# Patient Record
Sex: FEMALE | Race: WHITE | NOT HISPANIC OR LATINO | Employment: FULL TIME | URBAN - NONMETROPOLITAN AREA
[De-identification: names, ages, dates, MRNs, and addresses within clinical notes are randomized per-mention and may not be internally consistent; named-entity substitution may affect disease eponyms.]

---

## 2017-01-31 ENCOUNTER — TRANSCRIBE ORDERS (OUTPATIENT)
Dept: LAB | Facility: OTHER | Age: 52
End: 2017-01-31

## 2017-01-31 DIAGNOSIS — E78.00 PURE HYPERCHOLESTEROLEMIA: ICD-10-CM

## 2017-01-31 DIAGNOSIS — D50.9 NORMOCYTIC HYPOCHROMIC ANEMIA: Primary | ICD-10-CM

## 2017-02-01 ENCOUNTER — LAB (OUTPATIENT)
Dept: LAB | Facility: OTHER | Age: 52
End: 2017-02-01

## 2017-02-01 DIAGNOSIS — E78.00 PURE HYPERCHOLESTEROLEMIA: ICD-10-CM

## 2017-02-01 DIAGNOSIS — D50.9 NORMOCYTIC HYPOCHROMIC ANEMIA: ICD-10-CM

## 2017-02-01 LAB
ALBUMIN SERPL-MCNC: 4.3 G/DL (ref 3.2–5.5)
ALBUMIN/GLOB SERPL: 1.2 G/DL (ref 1–3)
ALP SERPL-CCNC: 59 U/L (ref 15–121)
ALT SERPL W P-5'-P-CCNC: 22 U/L (ref 10–60)
ANION GAP SERPL CALCULATED.3IONS-SCNC: 9 MMOL/L (ref 5–15)
AST SERPL-CCNC: 23 U/L (ref 10–60)
BASOPHILS # BLD AUTO: 0.02 10*3/MM3 (ref 0–0.2)
BASOPHILS NFR BLD AUTO: 0.3 % (ref 0–2)
BILIRUB SERPL-MCNC: 0.7 MG/DL (ref 0.2–1)
BUN BLD-MCNC: 15 MG/DL (ref 8–25)
BUN/CREAT SERPL: 16.7 (ref 7–25)
CALCIUM SPEC-SCNC: 9.7 MG/DL (ref 8.4–10.8)
CHLORIDE SERPL-SCNC: 102 MMOL/L (ref 100–112)
CHOLEST SERPL-MCNC: 172 MG/DL (ref 150–200)
CO2 SERPL-SCNC: 28 MMOL/L (ref 20–32)
CREAT BLD-MCNC: 0.9 MG/DL (ref 0.4–1.3)
DEPRECATED RDW RBC AUTO: 37.5 FL (ref 36.4–46.3)
EOSINOPHIL # BLD AUTO: 0.18 10*3/MM3 (ref 0–0.7)
EOSINOPHIL NFR BLD AUTO: 2.9 % (ref 0–7)
ERYTHROCYTE [DISTWIDTH] IN BLOOD BY AUTOMATED COUNT: 12.5 % (ref 11.5–14.5)
GFR SERPL CREATININE-BSD FRML MDRD: 67 ML/MIN/1.73 (ref 55–135)
GLOBULIN UR ELPH-MCNC: 3.5 GM/DL (ref 2.5–4.6)
GLUCOSE BLD-MCNC: 106 MG/DL (ref 70–100)
HCT VFR BLD AUTO: 39.2 % (ref 35–45)
HDLC SERPL-MCNC: 42 MG/DL (ref 35–100)
HGB BLD-MCNC: 13.2 G/DL (ref 12–15.5)
LDLC SERPL CALC-MCNC: 105 MG/DL
LDLC/HDLC SERPL: 2.5 {RATIO}
LYMPHOCYTES # BLD AUTO: 1.72 10*3/MM3 (ref 0.6–4.2)
LYMPHOCYTES NFR BLD AUTO: 27.8 % (ref 10–50)
MCH RBC QN AUTO: 28.6 PG (ref 26.5–34)
MCHC RBC AUTO-ENTMCNC: 33.7 G/DL (ref 31.4–36)
MCV RBC AUTO: 85 FL (ref 80–98)
MONOCYTES # BLD AUTO: 0.33 10*3/MM3 (ref 0–0.9)
MONOCYTES NFR BLD AUTO: 5.3 % (ref 0–12)
NEUTROPHILS # BLD AUTO: 3.93 10*3/MM3 (ref 2–8.6)
NEUTROPHILS NFR BLD AUTO: 63.7 % (ref 37–80)
PLATELET # BLD AUTO: 241 10*3/MM3 (ref 150–450)
PMV BLD AUTO: 10.2 FL (ref 8–12)
POTASSIUM BLD-SCNC: 3.8 MMOL/L (ref 3.4–5.4)
PROT SERPL-MCNC: 7.8 G/DL (ref 6.7–8.2)
RBC # BLD AUTO: 4.61 10*6/MM3 (ref 3.77–5.16)
SODIUM BLD-SCNC: 139 MMOL/L (ref 134–146)
TRIGL SERPL-MCNC: 126 MG/DL (ref 35–160)
VLDLC SERPL-MCNC: 25.2 MG/DL
WBC NRBC COR # BLD: 6.18 10*3/MM3 (ref 3.2–9.8)

## 2017-02-01 PROCEDURE — 85025 COMPLETE CBC W/AUTO DIFF WBC: CPT | Performed by: INTERNAL MEDICINE

## 2017-02-01 PROCEDURE — 36415 COLL VENOUS BLD VENIPUNCTURE: CPT | Performed by: INTERNAL MEDICINE

## 2017-02-01 PROCEDURE — 82728 ASSAY OF FERRITIN: CPT | Performed by: INTERNAL MEDICINE

## 2017-02-01 PROCEDURE — 80061 LIPID PANEL: CPT | Performed by: INTERNAL MEDICINE

## 2017-02-01 PROCEDURE — 80053 COMPREHEN METABOLIC PANEL: CPT | Performed by: INTERNAL MEDICINE

## 2017-02-02 LAB — FERRITIN SERPL-MCNC: 41.1 NG/ML (ref 6.2–137)

## 2017-02-06 ENCOUNTER — OFFICE VISIT (OUTPATIENT)
Dept: FAMILY MEDICINE CLINIC | Facility: CLINIC | Age: 52
End: 2017-02-06

## 2017-02-06 VITALS
HEART RATE: 88 BPM | SYSTOLIC BLOOD PRESSURE: 120 MMHG | HEIGHT: 62 IN | DIASTOLIC BLOOD PRESSURE: 71 MMHG | WEIGHT: 149 LBS | TEMPERATURE: 98.3 F | BODY MASS INDEX: 27.42 KG/M2

## 2017-02-06 DIAGNOSIS — K21.9 GASTROESOPHAGEAL REFLUX DISEASE, ESOPHAGITIS PRESENCE NOT SPECIFIED: ICD-10-CM

## 2017-02-06 DIAGNOSIS — G56.03 CARPAL TUNNEL SYNDROME ON BOTH SIDES: ICD-10-CM

## 2017-02-06 DIAGNOSIS — Z12.31 SCREENING MAMMOGRAM, ENCOUNTER FOR: Primary | ICD-10-CM

## 2017-02-06 DIAGNOSIS — M77.11 RIGHT LATERAL EPICONDYLITIS: ICD-10-CM

## 2017-02-06 DIAGNOSIS — D50.9 IRON DEFICIENCY ANEMIA, UNSPECIFIED IRON DEFICIENCY ANEMIA TYPE: ICD-10-CM

## 2017-02-06 DIAGNOSIS — E78.5 HYPERLIPIDEMIA, UNSPECIFIED HYPERLIPIDEMIA TYPE: Primary | ICD-10-CM

## 2017-02-06 PROCEDURE — 99214 OFFICE O/P EST MOD 30 MIN: CPT | Performed by: INTERNAL MEDICINE

## 2017-02-06 RX ORDER — ALBUTEROL SULFATE 90 UG/1
2 AEROSOL, METERED RESPIRATORY (INHALATION) EVERY 6 HOURS PRN
Qty: 1 INHALER | Refills: 5 | Status: SHIPPED | OUTPATIENT
Start: 2017-02-06 | End: 2018-11-12 | Stop reason: SDUPTHER

## 2017-02-06 RX ORDER — BUDESONIDE AND FORMOTEROL FUMARATE DIHYDRATE 160; 4.5 UG/1; UG/1
2 AEROSOL RESPIRATORY (INHALATION)
Qty: 1 INHALER | Refills: 5 | Status: SHIPPED | OUTPATIENT
Start: 2017-02-06 | End: 2018-11-12 | Stop reason: SDUPTHER

## 2017-02-06 NOTE — PROGRESS NOTES
"Subjective       History of Present Illness     Anel Mullen is a 52 y.o. 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  She comes in for annual follow up on iron deficiency anemia, hyperlipidemia.  She received improvement in her cholesterol in 2015 by switching to atorvastatin from pravastatin and running for regular physical exercise.  She reports she has not been getting in the physical exercise as often lately.        She was here in September reporting symptoms of numbness and weakness in the right wrist and fingers, which she felt was related to right carpal tunnel syndrome.  We referred her to Dr. Mishra and he diagnosed her with carpal tunnel syndrome on the right and secondary lateral epicondylitis on the right.  She also reports less symptoms on the left, which will more than likely eventually require surgery as well.  She has surgery scheduled next week on her right wrist and elbow.       She reports a chronic pruritic eczematous rash to the bilateral hands.  She has to wash her hands frequently.  Hydrocortisone cream has not been effective.  I recommended using a steroid cream with a barrier layer of Aquaphor for flares.  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       She plans to have her pap smear/GYN exam by Domenica Tolbert.  She will schedule a mammogram at that time.       Blood pressure 120/71, pulse 88, temperature 98.3 °F (36.8 °C), temperature source Oral, height 61.5\" (156.2 cm), weight 149 lb (67.6 kg).   Weight is up 5 pounds in the past year, but down 4 in the four months.  We discussed a goal weight of 135.      The patient's relevant past medical, surgical, and social history was reviewed in Epic.   Lab results are reviewed with the patient today.  Fasting glucose 106.  Ferritin increased to 41 on her Niferex q.o.d.  Total cholesterol is improved at 172.  HDL is slightly below goal at 42.  LDL improved at 105.  LDL/HDL ratio 2.5.        Review of " Systems   Constitutional: Negative for chills, fatigue and fever.   HENT: Negative for congestion, ear pain, postnasal drip, sinus pressure and sore throat.    Respiratory: Negative for cough, shortness of breath and wheezing.    Cardiovascular: Negative for chest pain, palpitations and leg swelling.   Gastrointestinal: Negative for abdominal pain, blood in stool, constipation, diarrhea, nausea and vomiting.   Endocrine: Negative for cold intolerance, heat intolerance, polydipsia and polyuria.   Genitourinary: Negative for dysuria, frequency, hematuria and urgency.   Skin: Negative for rash.   Neurological: Negative for syncope and weakness.        Objective     Physical Exam   Constitutional: She is oriented to person, place, and time. She appears well-developed and well-nourished. No distress.   HENT:   Head: Normocephalic and atraumatic.   Nose: Right sinus exhibits no maxillary sinus tenderness and no frontal sinus tenderness. Left sinus exhibits no maxillary sinus tenderness and no frontal sinus tenderness.   Mouth/Throat: Uvula is midline, oropharynx is clear and moist and mucous membranes are normal. No oral lesions. No tonsillar exudate.   Eyes: Conjunctivae and EOM are normal. Pupils are equal, round, and reactive to light.   Neck: Trachea normal. Neck supple. No JVD present. Carotid bruit is not present. No tracheal deviation present. No thyroid mass and no thyromegaly present.   Cardiovascular: Normal rate, regular rhythm and normal heart sounds.   No extrasystoles are present. PMI is not displaced.    No murmur heard.  Pulmonary/Chest: Effort normal and breath sounds normal. No accessory muscle usage. No respiratory distress. She has no decreased breath sounds. She has no wheezes. She has no rhonchi. She has no rales.   Abdominal: Soft. Bowel sounds are normal. She exhibits no distension. There is no hepatosplenomegaly. There is no tenderness.       Vascular Status -  Her exam exhibits right foot  vasculature normal. Her exam exhibits no right foot edema. Her exam exhibits left foot vasculature normal. Her exam exhibits no left foot edema.  Lymphadenopathy:     She has no cervical adenopathy.   Neurological: She is alert and oriented to person, place, and time. No cranial nerve deficit. Coordination normal.   Skin: Skin is warm, dry and intact. No rash noted. No cyanosis. Nails show no clubbing.   Exam of the bilateral hands reveals some eczematous skin, but no cellulitis.   Psychiatric: She has a normal mood and affect. Her speech is normal and behavior is normal. Thought content normal.   Vitals reviewed.       Assessment/Plan      I recommended using a steroid cream, such as hydrocortisone or Kenalog, and a barrier layer of Aquaphor for flares of the chronic eczematous rash.  Use a barrier chronically as many times daily as she can arrange.    Continue with current prescription medications including the current dose of Niferex.    Continue with diet, exercise, and weight loss efforts.  We discussed a weight goal of 135 pounds and recommended weekly weights to monitor.        Scribed for Dr. Bryan by Mishel Patrick [facility].     Diagnoses and all orders for this visit:    Hyperlipidemia, unspecified hyperlipidemia type  -     Comprehensive Metabolic Panel; Future  -     Lipid Panel; Future    Iron deficiency anemia, unspecified iron deficiency anemia type  -     CBC Auto Differential; Future  -     Vitamin B12; Future  -     Ferritin; Future    Gastroesophageal reflux disease, esophagitis presence not specified    Carpal tunnel syndrome on both sides    Right lateral epicondylitis    Other orders  -     albuterol (PROVENTIL HFA;VENTOLIN HFA) 108 (90 BASE) MCG/ACT inhaler; Inhale 2 puffs Every 6 (Six) Hours As Needed for wheezing.  -     budesonide-formoterol (SYMBICORT) 160-4.5 MCG/ACT inhaler; Inhale 2 puffs 2 (Two) Times a Day.      Lab on 02/01/2017   Component Date Value Ref Range Status   • Total  Cholesterol 02/01/2017 172  150 - 200 mg/dL Final   • Triglycerides 02/01/2017 126  35 - 160 mg/dL Final   • HDL Cholesterol 02/01/2017 42  35 - 100 mg/dL Final   • LDL Cholesterol  02/01/2017 105  mg/dL Final   • VLDL Cholesterol 02/01/2017 25.2  mg/dL Final   • LDL/HDL Ratio 02/01/2017 2.50   Final   • Glucose 02/01/2017 106* 70 - 100 mg/dL Final   • BUN 02/01/2017 15  8 - 25 mg/dL Final   • Creatinine 02/01/2017 0.90  0.40 - 1.30 mg/dL Final   • Sodium 02/01/2017 139  134 - 146 mmol/L Final   • Potassium 02/01/2017 3.8  3.4 - 5.4 mmol/L Final   • Chloride 02/01/2017 102  100 - 112 mmol/L Final   • CO2 02/01/2017 28.0  20.0 - 32.0 mmol/L Final   • Calcium 02/01/2017 9.7  8.4 - 10.8 mg/dL Final   • Total Protein 02/01/2017 7.8  6.7 - 8.2 g/dL Final   • Albumin 02/01/2017 4.30  3.20 - 5.50 g/dL Final   • ALT (SGPT) 02/01/2017 22  10 - 60 U/L Final   • AST (SGOT) 02/01/2017 23  10 - 60 U/L Final   • Alkaline Phosphatase 02/01/2017 59  15 - 121 U/L Final   • Total Bilirubin 02/01/2017 0.7  0.2 - 1.0 mg/dL Final   • eGFR Non African Amer 02/01/2017 67  55 - 135 mL/min/1.73 Final   • Globulin 02/01/2017 3.5  2.5 - 4.6 gm/dL Final   • A/G Ratio 02/01/2017 1.2  1.0 - 3.0 g/dL Final   • BUN/Creatinine Ratio 02/01/2017 16.7  7.0 - 25.0 Final   • Anion Gap 02/01/2017 9.0  5.0 - 15.0 mmol/L Final   • Ferritin 02/01/2017 41.10  6.20 - 137.00 ng/mL Final   • WBC 02/01/2017 6.18  3.20 - 9.80 10*3/mm3 Final   • RBC 02/01/2017 4.61  3.77 - 5.16 10*6/mm3 Final   • Hemoglobin 02/01/2017 13.2  12.0 - 15.5 g/dL Final   • Hematocrit 02/01/2017 39.2  35.0 - 45.0 % Final   • MCV 02/01/2017 85.0  80.0 - 98.0 fL Final   • MCH 02/01/2017 28.6  26.5 - 34.0 pg Final   • MCHC 02/01/2017 33.7  31.4 - 36.0 g/dL Final   • RDW 02/01/2017 12.5  11.5 - 14.5 % Final   • RDW-SD 02/01/2017 37.5  36.4 - 46.3 fl Final   • MPV 02/01/2017 10.2  8.0 - 12.0 fL Final   • Platelets 02/01/2017 241  150 - 450 10*3/mm3 Final   • Neutrophil % 02/01/2017 63.7  37.0  - 80.0 % Final   • Lymphocyte % 02/01/2017 27.8  10.0 - 50.0 % Final   • Monocyte % 02/01/2017 5.3  0.0 - 12.0 % Final   • Eosinophil % 02/01/2017 2.9  0.0 - 7.0 % Final   • Basophil % 02/01/2017 0.3  0.0 - 2.0 % Final   • Neutrophils, Absolute 02/01/2017 3.93  2.00 - 8.60 10*3/mm3 Final   • Lymphocytes, Absolute 02/01/2017 1.72  0.60 - 4.20 10*3/mm3 Final   • Monocytes, Absolute 02/01/2017 0.33  0.00 - 0.90 10*3/mm3 Final   • Eosinophils, Absolute 02/01/2017 0.18  0.00 - 0.70 10*3/mm3 Final   • Basophils, Absolute 02/01/2017 0.02  0.00 - 0.20 10*3/mm3 Final   ]   ondition

## 2017-02-08 RX ORDER — OSELTAMIVIR PHOSPHATE 75 MG/1
75 CAPSULE ORAL 2 TIMES DAILY
Qty: 10 CAPSULE | Refills: 0 | Status: SHIPPED | OUTPATIENT
Start: 2017-02-08 | End: 2017-05-22

## 2017-04-20 RX ORDER — PANTOPRAZOLE SODIUM 40 MG/1
TABLET, DELAYED RELEASE ORAL
Qty: 90 TABLET | Refills: 3 | Status: SHIPPED | OUTPATIENT
Start: 2017-04-20 | End: 2018-04-19 | Stop reason: SDUPTHER

## 2017-05-22 ENCOUNTER — OFFICE VISIT (OUTPATIENT)
Dept: FAMILY MEDICINE CLINIC | Facility: CLINIC | Age: 52
End: 2017-05-22

## 2017-05-22 VITALS
WEIGHT: 151.8 LBS | HEIGHT: 62 IN | SYSTOLIC BLOOD PRESSURE: 150 MMHG | BODY MASS INDEX: 27.94 KG/M2 | HEART RATE: 80 BPM | DIASTOLIC BLOOD PRESSURE: 88 MMHG | TEMPERATURE: 98.8 F

## 2017-05-22 DIAGNOSIS — M25.531 RIGHT WRIST PAIN: ICD-10-CM

## 2017-05-22 DIAGNOSIS — G56.01 CARPAL TUNNEL SYNDROME OF RIGHT WRIST: ICD-10-CM

## 2017-05-22 DIAGNOSIS — M77.8 TENDINITIS OF RIGHT WRIST: Primary | ICD-10-CM

## 2017-05-22 PROCEDURE — 99213 OFFICE O/P EST LOW 20 MIN: CPT | Performed by: INTERNAL MEDICINE

## 2017-05-22 RX ORDER — NAPROXEN 500 MG/1
500 TABLET ORAL 2 TIMES DAILY WITH MEALS
Qty: 60 TABLET | Refills: 0 | Status: SHIPPED | OUTPATIENT
Start: 2017-05-22 | End: 2017-05-30 | Stop reason: ALTCHOICE

## 2017-05-30 RX ORDER — CELECOXIB 200 MG/1
200 CAPSULE ORAL DAILY
Qty: 30 CAPSULE | Refills: 0 | Status: SHIPPED | OUTPATIENT
Start: 2017-05-30 | End: 2017-12-14

## 2017-06-02 RX ORDER — AZITHROMYCIN 250 MG/1
TABLET, FILM COATED ORAL
Qty: 6 TABLET | Refills: 0 | Status: SHIPPED | OUTPATIENT
Start: 2017-06-02 | End: 2017-11-07

## 2017-07-17 RX ORDER — ATORVASTATIN CALCIUM 20 MG/1
TABLET, FILM COATED ORAL
Qty: 90 TABLET | Refills: 3 | Status: SHIPPED | OUTPATIENT
Start: 2017-07-17 | End: 2018-08-23 | Stop reason: SDUPTHER

## 2017-10-03 ENCOUNTER — LAB (OUTPATIENT)
Dept: LAB | Facility: OTHER | Age: 52
End: 2017-10-03

## 2017-10-03 DIAGNOSIS — D50.9 IRON DEFICIENCY ANEMIA, UNSPECIFIED IRON DEFICIENCY ANEMIA TYPE: ICD-10-CM

## 2017-10-03 DIAGNOSIS — R53.83 FATIGUE, UNSPECIFIED TYPE: Primary | ICD-10-CM

## 2017-10-03 DIAGNOSIS — R79.89 ABNORMAL THYROID BLOOD TEST: Primary | ICD-10-CM

## 2017-10-03 DIAGNOSIS — R79.89 ABNORMAL THYROID BLOOD TEST: ICD-10-CM

## 2017-10-03 DIAGNOSIS — E78.5 HYPERLIPIDEMIA, UNSPECIFIED HYPERLIPIDEMIA TYPE: ICD-10-CM

## 2017-10-03 LAB
ALBUMIN SERPL-MCNC: 4.5 G/DL (ref 3.2–5.5)
ALBUMIN/GLOB SERPL: 1.4 G/DL (ref 1–3)
ALP SERPL-CCNC: 47 U/L (ref 15–121)
ALT SERPL W P-5'-P-CCNC: 22 U/L (ref 10–60)
ANION GAP SERPL CALCULATED.3IONS-SCNC: 8 MMOL/L (ref 5–15)
AST SERPL-CCNC: 21 U/L (ref 10–60)
BASOPHILS # BLD AUTO: 0.01 10*3/MM3 (ref 0–0.2)
BASOPHILS NFR BLD AUTO: 0.2 % (ref 0–2)
BILIRUB SERPL-MCNC: 0.6 MG/DL (ref 0.2–1)
BUN BLD-MCNC: 26 MG/DL (ref 8–25)
BUN/CREAT SERPL: 32.5 (ref 7–25)
CALCIUM SPEC-SCNC: 9.4 MG/DL (ref 8.4–10.8)
CHLORIDE SERPL-SCNC: 104 MMOL/L (ref 100–112)
CHOLEST SERPL-MCNC: 196 MG/DL (ref 150–200)
CO2 SERPL-SCNC: 30 MMOL/L (ref 20–32)
CREAT BLD-MCNC: 0.8 MG/DL (ref 0.4–1.3)
DEPRECATED RDW RBC AUTO: 40.2 FL (ref 36.4–46.3)
EOSINOPHIL # BLD AUTO: 0.14 10*3/MM3 (ref 0–0.7)
EOSINOPHIL NFR BLD AUTO: 2.8 % (ref 0–7)
ERYTHROCYTE [DISTWIDTH] IN BLOOD BY AUTOMATED COUNT: 12.7 % (ref 11.5–14.5)
FERRITIN SERPL-MCNC: 43.4 NG/ML (ref 11.1–264)
GFR SERPL CREATININE-BSD FRML MDRD: 76 ML/MIN/1.73 (ref 51–120)
GLOBULIN UR ELPH-MCNC: 3.2 GM/DL (ref 2.5–4.6)
GLUCOSE BLD-MCNC: 121 MG/DL (ref 70–100)
HCT VFR BLD AUTO: 39.4 % (ref 35–45)
HDLC SERPL-MCNC: 45 MG/DL (ref 35–100)
HGB BLD-MCNC: 13.1 G/DL (ref 12–15.5)
LDLC SERPL CALC-MCNC: 134 MG/DL
LDLC/HDLC SERPL: 2.99 {RATIO}
LYMPHOCYTES # BLD AUTO: 1.86 10*3/MM3 (ref 0.6–4.2)
LYMPHOCYTES NFR BLD AUTO: 37.2 % (ref 10–50)
MCH RBC QN AUTO: 29.3 PG (ref 26.5–34)
MCHC RBC AUTO-ENTMCNC: 33.2 G/DL (ref 31.4–36)
MCV RBC AUTO: 88.1 FL (ref 80–98)
MONOCYTES # BLD AUTO: 0.32 10*3/MM3 (ref 0–0.9)
MONOCYTES NFR BLD AUTO: 6.4 % (ref 0–12)
NEUTROPHILS # BLD AUTO: 2.67 10*3/MM3 (ref 2–8.6)
NEUTROPHILS NFR BLD AUTO: 53.4 % (ref 37–80)
PLATELET # BLD AUTO: 213 10*3/MM3 (ref 150–450)
PMV BLD AUTO: 11 FL (ref 8–12)
POTASSIUM BLD-SCNC: 4.1 MMOL/L (ref 3.4–5.4)
PROT SERPL-MCNC: 7.7 G/DL (ref 6.7–8.2)
RBC # BLD AUTO: 4.47 10*6/MM3 (ref 3.77–5.16)
SODIUM BLD-SCNC: 142 MMOL/L (ref 134–146)
T3 SERPL-MCNC: 132 NG/DL (ref 97–169)
T4 FREE SERPL-MCNC: 1.24 NG/DL (ref 0.78–2.19)
TRIGL SERPL-MCNC: 83 MG/DL (ref 35–160)
TSH SERPL DL<=0.05 MIU/L-ACNC: 0.21 MIU/ML (ref 0.46–4.68)
VIT B12 BLD-MCNC: 868 PG/ML (ref 239–931)
VLDLC SERPL-MCNC: 16.6 MG/DL
WBC NRBC COR # BLD: 5 10*3/MM3 (ref 3.2–9.8)

## 2017-10-03 PROCEDURE — 36415 COLL VENOUS BLD VENIPUNCTURE: CPT | Performed by: INTERNAL MEDICINE

## 2017-10-03 PROCEDURE — 84443 ASSAY THYROID STIM HORMONE: CPT | Performed by: NURSE PRACTITIONER

## 2017-10-03 PROCEDURE — 80053 COMPREHEN METABOLIC PANEL: CPT | Performed by: INTERNAL MEDICINE

## 2017-10-03 PROCEDURE — 82607 VITAMIN B-12: CPT | Performed by: NURSE PRACTITIONER

## 2017-10-03 PROCEDURE — 84439 ASSAY OF FREE THYROXINE: CPT | Performed by: NURSE PRACTITIONER

## 2017-10-03 PROCEDURE — 84480 ASSAY TRIIODOTHYRONINE (T3): CPT | Performed by: NURSE PRACTITIONER

## 2017-10-03 PROCEDURE — 80061 LIPID PANEL: CPT | Performed by: INTERNAL MEDICINE

## 2017-10-03 PROCEDURE — 82728 ASSAY OF FERRITIN: CPT | Performed by: NURSE PRACTITIONER

## 2017-10-03 PROCEDURE — 85025 COMPLETE CBC W/AUTO DIFF WBC: CPT | Performed by: INTERNAL MEDICINE

## 2017-10-17 ENCOUNTER — OFFICE VISIT (OUTPATIENT)
Dept: OBSTETRICS AND GYNECOLOGY | Facility: CLINIC | Age: 52
End: 2017-10-17

## 2017-10-17 VITALS
RESPIRATION RATE: 16 BRPM | BODY MASS INDEX: 26.02 KG/M2 | WEIGHT: 141.4 LBS | HEIGHT: 62 IN | SYSTOLIC BLOOD PRESSURE: 110 MMHG | DIASTOLIC BLOOD PRESSURE: 62 MMHG | HEART RATE: 90 BPM

## 2017-10-17 DIAGNOSIS — Z79.890 HORMONE REPLACEMENT THERAPY (POSTMENOPAUSAL): Primary | ICD-10-CM

## 2017-10-17 PROCEDURE — 99213 OFFICE O/P EST LOW 20 MIN: CPT | Performed by: NURSE PRACTITIONER

## 2017-10-17 RX ORDER — HYDROCODONE BITARTRATE AND ACETAMINOPHEN 5; 325 MG/1; MG/1
TABLET ORAL
Refills: 0 | COMMUNITY
Start: 2017-10-06 | End: 2017-11-07

## 2017-10-18 NOTE — PROGRESS NOTES
Subjective   Anel Mullen is a 52 y.o. female.     History of Present Illness   Pt presents for refills on HRT. She is currently on compounded Bi-est 1mg, Progesterone 2.5mg and testosterone 0.5mg. She states she is doing well on it but does note 1-2 hots flashes daily and some difficulty sleeping, leaving her fatigued during the day. Libido is adequate at this time per pt. She has lost 10lbs and BP is WNL. MMG up to date. Due to pap smear.     The following portions of the patient's history were reviewed and updated as appropriate: allergies, current medications, past family history, past medical history, past social history, past surgical history and problem list.    Review of Systems   Constitutional: Negative.  Negative for unexpected weight change.        Intentional weight loss   Respiratory: Negative.    Cardiovascular: Negative.  Negative for chest pain and palpitations.   Endocrine: Positive for heat intolerance (hot flashes).   Genitourinary: Negative for vaginal bleeding.   Neurological: Negative for dizziness, syncope, light-headedness and headaches.   Psychiatric/Behavioral: Negative.        Objective   Physical Exam   Constitutional: She is oriented to person, place, and time. She appears well-developed and well-nourished.   Cardiovascular: Normal rate, regular rhythm and normal heart sounds.    Pulmonary/Chest: Effort normal and breath sounds normal.   Neurological: She is alert and oriented to person, place, and time.   Psychiatric: She has a normal mood and affect. Her behavior is normal.   Vitals reviewed.      Assessment/Plan   Anel was seen today for refill on steriod cream.    Diagnoses and all orders for this visit:    Hormone replacement therapy (postmenopausal)  -     progesterone (PROMETRIUM) 100 MG capsule; Take 1 capsule by mouth Daily.     Discussed possible changes to HRT. Pt is in agreement. Increase Bi-Est to 1.5 mg (70/30), testosterone 0.5mg. Removed Progesterone from compound  and take PO Prometrium 100mg at bedtime. All send to [facility]. RTC in 6 months for refills or sooner PRN for changes. Schedule for earliest convenience well woman exam.

## 2017-11-07 ENCOUNTER — OFFICE VISIT (OUTPATIENT)
Dept: OBSTETRICS AND GYNECOLOGY | Facility: CLINIC | Age: 52
End: 2017-11-07

## 2017-11-07 VITALS
HEIGHT: 62 IN | BODY MASS INDEX: 25.76 KG/M2 | WEIGHT: 140 LBS | HEART RATE: 69 BPM | RESPIRATION RATE: 14 BRPM | SYSTOLIC BLOOD PRESSURE: 122 MMHG | DIASTOLIC BLOOD PRESSURE: 80 MMHG

## 2017-11-07 DIAGNOSIS — Z79.890 POSTMENOPAUSAL HRT (HORMONE REPLACEMENT THERAPY): ICD-10-CM

## 2017-11-07 DIAGNOSIS — Z01.419 ENCOUNTER FOR GYNECOLOGICAL EXAMINATION WITH PAPANICOLAOU SMEAR OF CERVIX: Primary | ICD-10-CM

## 2017-11-07 PROCEDURE — 99396 PREV VISIT EST AGE 40-64: CPT | Performed by: NURSE PRACTITIONER

## 2017-11-07 PROCEDURE — 88142 CYTOPATH C/V THIN LAYER: CPT | Performed by: PATHOLOGY

## 2017-11-07 RX ORDER — MEDROXYPROGESTERONE ACETATE 10 MG/1
10 TABLET ORAL DAILY
Qty: 30 TABLET | Refills: 5 | Status: SHIPPED | OUTPATIENT
Start: 2017-11-07 | End: 2017-11-07 | Stop reason: SDUPTHER

## 2017-11-07 RX ORDER — MEDROXYPROGESTERONE ACETATE 10 MG/1
10 TABLET ORAL DAILY
Qty: 30 TABLET | Refills: 5 | Status: SHIPPED | OUTPATIENT
Start: 2017-11-07 | End: 2018-07-23 | Stop reason: SDUPTHER

## 2017-11-08 NOTE — PROGRESS NOTES
Subjective   Chief Complaint   Patient presents with   • Gynecologic Exam     well woman annual visit     Anel Mullen is a 52 y.o. year old  presenting to be seen for her annual exam.  Pt is on bio identical HRT compound of Bi-est 1.5mg, Testosterone 0.5mg cream with a prometrium capsule at bedtime. Pt states her cost went up nearly $50 when we switched her to prometrium about 1 month ago. She does notice that her sleep is better since starting. She feels her hot flashes are still there but tolerable having an average of 1 per day. Libido still has room for improvement.     She is sexually active.  In the past 12 months there has not been new sexual partners.  Condoms are not typically used.  She would not like to be screened for STD's at today's exam.     She exercises regularly: yes.  She wears her seat belt:yes.  She has concerns about domestic violence: no.  She is taking Vit D and Calcium:yes  Last colonoscopy:   Last DEXA: Never  Last MMG: 3/10/2017  Last PAP: 2015  Hx of abnormal pap: Yes, had colposcopy but not need for further treatment per pt. It was 20+ years ago       NATURAL MENOPAUSE  LMP:     OB History      Para Term  AB Living    3 3 3       SAB TAB Ectopic Multiple Live Births                  No Additional Complaints Reported    The following portions of the patient's history were reviewed and updated as appropriate:problem list, current medications, allergies, past family history, past medical history, past social history and past surgical history.    Smoking status: Never Smoker                                                              Smokeless status: Never Used                        Review of Systems   Constitutional: Negative.  Negative for unexpected weight change.   Respiratory: Negative.    Cardiovascular: Negative.    Gastrointestinal: Negative.  Negative for constipation and diarrhea.   Endocrine: Negative.  Heat intolerance: mild hot flashes.  "  Genitourinary: Negative.  Negative for pelvic pain, vaginal bleeding and vaginal discharge.   Musculoskeletal:        Recent carpal tunnel surgery   Skin: Negative.    Neurological: Negative for dizziness, syncope, light-headedness and headaches.   Psychiatric/Behavioral: Negative for suicidal ideas. The patient is not nervous/anxious (well controlled).         Decreased libido         Objective   /80 (BP Location: Right arm, Patient Position: Sitting, Cuff Size: Adult)  Pulse 69  Resp 14  Ht 61.5\" (156.2 cm)  Wt 140 lb (63.5 kg)  LMP 01/31/2009 (Approximate)  Breastfeeding? No  BMI 26.02 kg/m2    General:  well developed; well nourished  no acute distress   Skin:  No suspicious lesions seen   Cardiac: Heart sounds are normal.  Regular rate and rhythm without murmur, gallop or rub.   Resp:  Normal expansion.  Clear to auscultation.  No rales, rhonchi, or wheezing.   Thyroid: normal to inspection and palpation   Breasts:  Examined in supine position  Symmetric without masses or skin dimpling  Nipples normal without inversion, lesions or discharge  There are no palpable axillary nodes   Abdomen: soft, non-tender; no masses  no umbilical or inginual hernias are present  no hepato-splenomegaly  Normal findings: bowel sounds normal   Psych: alert,oriented, in NAD with a full range of affect, normal behavior and no psychotic features   Pelvis: Clinical staff was present for exam  External genitalia:  normal appearance of the external genitalia including Bartholin's and [place]'s glands.  :  urethral meatus normal;  Vaginal:  normal pink mucosa without prolapse or lesions.  Cervix:  normal appearance.  Uterus:  normal size, shape and consistency.  Adnexa:  normal bimanual exam of the adnexa.     Lab Review   CBC, CMP, TSH and lipid    Imaging  Mammogram report       Assessment   1. Encounter for GYN exam with pap smear of cervix  2. Postmenopause HRT     Plan   1. Pap results: I will send card in mail or " call if abnormal. RTC annually for well woman exams  2. Dosage adjustment recommended in HRT. Script called to [facility] for Bi-Est 1.5mg (70/30), Testosterone 1 mg compound. D/C Prometrium and take provera 10mg tablet at bedtime due to cost. Pt is agreeable to plan of care. RTC in 6 months for ongoing HRT prescriptions.     New Medications Ordered This Visit   Medications   • medroxyPROGESTERone (PROVERA) 10 MG tablet     Sig: Take 1 tablet by mouth Daily.     Dispense:  30 tablet     Refill:  5     Please discontinue Prometrium that pt was previously given in October.      This note was electronically signed.    Domenica Smith, APRN  November 8, 2017

## 2017-11-10 LAB
LAB AP CASE REPORT: NORMAL
LAB AP GYN ADDITIONAL INFORMATION: NORMAL
Lab: NORMAL
PATH INTERP SPEC-IMP: NORMAL
STAT OF ADQ CVX/VAG CYTO-IMP: NORMAL

## 2017-11-22 RX ORDER — AZITHROMYCIN 250 MG/1
TABLET, FILM COATED ORAL
Qty: 6 TABLET | Refills: 0 | Status: SHIPPED | OUTPATIENT
Start: 2017-11-22 | End: 2017-12-14

## 2017-11-22 RX ORDER — CETIRIZINE HYDROCHLORIDE, PSEUDOEPHEDRINE HYDROCHLORIDE 5; 120 MG/1; MG/1
1 TABLET, FILM COATED, EXTENDED RELEASE ORAL 2 TIMES DAILY
Qty: 60 TABLET | Refills: 5 | Status: SHIPPED | OUTPATIENT
Start: 2017-11-22 | End: 2017-12-14

## 2017-12-14 ENCOUNTER — OFFICE VISIT (OUTPATIENT)
Dept: FAMILY MEDICINE CLINIC | Facility: CLINIC | Age: 52
End: 2017-12-14

## 2017-12-14 VITALS
HEIGHT: 62 IN | WEIGHT: 136.8 LBS | HEART RATE: 76 BPM | TEMPERATURE: 98.3 F | BODY MASS INDEX: 25.17 KG/M2 | SYSTOLIC BLOOD PRESSURE: 130 MMHG | DIASTOLIC BLOOD PRESSURE: 84 MMHG

## 2017-12-14 DIAGNOSIS — J02.9 ACUTE PHARYNGITIS, UNSPECIFIED ETIOLOGY: ICD-10-CM

## 2017-12-14 DIAGNOSIS — J06.9 ACUTE URI: Primary | ICD-10-CM

## 2017-12-14 PROCEDURE — 99213 OFFICE O/P EST LOW 20 MIN: CPT | Performed by: INTERNAL MEDICINE

## 2017-12-14 RX ORDER — GUAIFENESIN, PSEUDOEPHEDRINE HYDROCHLORIDE 600; 60 MG/1; MG/1
TABLET, EXTENDED RELEASE ORAL
Qty: 30 TABLET | Refills: 0 | Status: SHIPPED | OUTPATIENT
Start: 2017-12-14 | End: 2018-10-12

## 2017-12-14 RX ORDER — AZITHROMYCIN 250 MG/1
TABLET, FILM COATED ORAL
Qty: 6 TABLET | Refills: 0 | Status: SHIPPED | OUTPATIENT
Start: 2017-12-14 | End: 2018-01-09

## 2017-12-14 NOTE — PROGRESS NOTES
"Subjective     Anel Mullen is a 52 y.o. female.     History of Present Illness     Kamille woke this morning with sore throat and some tender lymph nodes on the left side of the neck and under the left mandible.  She denies sore throat.  She reports that she has been experiencing increased nasal congestion and some rhinitis and postnasal drip for the past few days.  She has been using Zyrtec and Flonase nasal spray.  Her secretions seemed fairly thick.  She denies flulike aches and pains.  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  No difficulty swallowing, but somewhat painful.  No significant headache.  No ear pain.  She denies chest congestion or cough.    Review of Systems   Constitutional: Negative for chills, fatigue and fever.   HENT: Positive for congestion, postnasal drip and sinus pressure. Negative for ear pain, facial swelling and sore throat.    Respiratory: Negative for cough, shortness of breath and wheezing.    Cardiovascular: Negative for chest pain, palpitations and leg swelling.   Gastrointestinal: Negative for abdominal pain, blood in stool, constipation, diarrhea, nausea and vomiting.   Endocrine: Negative for cold intolerance, heat intolerance, polydipsia and polyuria.   Genitourinary: Negative for dysuria, frequency, hematuria and urgency.   Skin: Negative for rash.   Neurological: Negative for syncope and weakness.       Objective     /84  Pulse 76  Temp 98.3 °F (36.8 °C) (Oral)   Ht 156.2 cm (61.5\")  Wt 62.1 kg (136 lb 12.8 oz)  LMP 01/31/2009 (Approximate)  BMI 25.43 kg/m2    Physical Exam   Constitutional: She is oriented to person, place, and time. She appears well-developed and well-nourished. No distress.   HENT:   Head: Normocephalic and atraumatic.   Nose: Right sinus exhibits no maxillary sinus tenderness and no frontal sinus tenderness. Left sinus exhibits no maxillary sinus tenderness and no frontal " sinus tenderness.   Mouth/Throat: Uvula is midline, oropharynx is clear and moist and mucous membranes are normal. No oral lesions. No tonsillar exudate.   Nasal mucosa is congested.  There is some mild bilateral maxillary sinus tenderness.  Posterior oropharynx is erythematous, but no exudate.  Tonsils are absent.  Mild/moderate clear postnasal drip.  Some mild right submandibular lymphadenopathy is noted   Eyes: Conjunctivae and EOM are normal. Pupils are equal, round, and reactive to light.   Neck: Trachea normal. Neck supple. No JVD present. Carotid bruit is not present. No tracheal deviation present. No thyroid mass and no thyromegaly present.   Some mild right anterior cervical lymphadenopathy   Cardiovascular: Normal rate, regular rhythm and normal heart sounds.   No extrasystoles are present. PMI is not displaced.    No murmur heard.  Pulmonary/Chest: Effort normal and breath sounds normal. No accessory muscle usage. No respiratory distress. She has no decreased breath sounds. She has no wheezes. She has no rhonchi. She has no rales.   Abdominal: There is no hepatosplenomegaly.       Vascular Status -  Her exam exhibits right foot vasculature normal. Her exam exhibits no right foot edema. Her exam exhibits left foot vasculature normal. Her exam exhibits no left foot edema.  Lymphadenopathy:     She has cervical adenopathy.   Neurological: She is alert and oriented to person, place, and time. No cranial nerve deficit. Coordination normal.   Skin: Skin is warm, dry and intact. No rash noted. No cyanosis. Nails show no clubbing.   Psychiatric: She has a normal mood and affect. Her speech is normal and behavior is normal. Thought content normal.   Vitals reviewed.      PHQ-2/PHQ-9 Depression Screening 12/14/2017   Little interest or pleasure in doing things 0   Feeling down, depressed, or hopeless 0   Trouble falling or staying asleep, or sleeping too much -   Feeling tired or having little energy -   Poor  appetite or overeating -   Feeling bad about yourself - or that you are a failure or have let yourself or your family down -   Trouble concentrating on things, such as reading the newspaper or watching television -   Moving or speaking so slowly that other people could have noticed. Or the opposite - being so fidgety or restless that you have been moving around a lot more than usual -   Thoughts that you would be better off dead, or of hurting yourself in some way -   Total Score 0   If you checked off any problems, how difficult have these problems made it for you to do your work, take care of things at home, or get along with other people? -       Assessment/Plan     Z-Ivan as directed.  Mucinex D 1-2 times daily.  Continue the Flonase nasal spray.  Suggested taking a break from the Zyrtec, as it may be thickening or secretions during winter heating season.  If mild allergy symptoms return, try Claritin 10 mg every other day or daily as needed.    Diagnoses and all orders for this visit:    Acute URI    Acute pharyngitis, unspecified etiology    Other orders  -     azithromycin (ZITHROMAX) 250 MG tablet; Take 2 tablets the first day, then 1 tablet daily for 4 days.  -     pseudoephedrine-guaifenesin (MUCINEX D)  MG per 12 hr tablet; 1 po qam and may repeat 1 at supper prn congestion        No visits with results within 3 Week(s) from this visit.  Latest known visit with results is:    Office Visit on 11/07/2017   Component Date Value Ref Range Status   • Case Report 11/07/2017    Final                    Value:Gynecologic Cytology Report                       Case: [id number]                                  Authorizing Provider:  Domenica Jurado              Collected:           11/07/2017 02:40 PM                                 RICKY Smith                                                         Ordering Location:     [facility]     Received:            11/07/2017 04:50 PM                                  [facility]                                                               First Screen:          Candis Clemons                                                              Specimen:    Liquid-Based Pap, Screening, Cervix                                                       • Interpretation 11/07/2017 Negative for intraepithelial lesion or malignancy    Final   • Specimen Adequacy 11/07/2017 Satisfactory for evaluation, endocervical/transformation zone component present   Final   • Additional Information 11/07/2017    Final                    Value:This result contains rich text formatting which cannot be displayed here.   ]

## 2017-12-18 RX ORDER — OSELTAMIVIR PHOSPHATE 75 MG/1
75 CAPSULE ORAL DAILY
Qty: 10 CAPSULE | Refills: 0 | Status: SHIPPED | OUTPATIENT
Start: 2017-12-18 | End: 2018-01-09

## 2017-12-19 RX ORDER — SULFAMETHOXAZOLE AND TRIMETHOPRIM 800; 160 MG/1; MG/1
1 TABLET ORAL 2 TIMES DAILY
Qty: 20 TABLET | Refills: 0 | Status: SHIPPED | OUTPATIENT
Start: 2017-12-19 | End: 2017-12-22

## 2017-12-22 RX ORDER — ONDANSETRON 4 MG/1
4 TABLET, ORALLY DISINTEGRATING ORAL EVERY 8 HOURS PRN
Qty: 21 TABLET | Refills: 1 | Status: SHIPPED | OUTPATIENT
Start: 2017-12-22 | End: 2020-02-17 | Stop reason: SDUPTHER

## 2017-12-22 RX ORDER — CIPROFLOXACIN 500 MG/1
500 TABLET, FILM COATED ORAL EVERY 12 HOURS SCHEDULED
Qty: 14 TABLET | Refills: 0 | Status: SHIPPED | OUTPATIENT
Start: 2017-12-22 | End: 2017-12-29

## 2017-12-22 RX ORDER — FLUCONAZOLE 150 MG/1
150 TABLET ORAL ONCE
Qty: 2 TABLET | Refills: 0 | Status: SHIPPED | OUTPATIENT
Start: 2017-12-22 | End: 2017-12-22

## 2018-01-09 ENCOUNTER — OFFICE VISIT (OUTPATIENT)
Dept: FAMILY MEDICINE CLINIC | Facility: CLINIC | Age: 53
End: 2018-01-09

## 2018-01-09 VITALS
SYSTOLIC BLOOD PRESSURE: 124 MMHG | DIASTOLIC BLOOD PRESSURE: 86 MMHG | HEIGHT: 62 IN | HEART RATE: 84 BPM | TEMPERATURE: 98.7 F | WEIGHT: 135 LBS | BODY MASS INDEX: 24.84 KG/M2

## 2018-01-09 DIAGNOSIS — J45.21 MILD INTERMITTENT ASTHMA WITH ACUTE EXACERBATION: ICD-10-CM

## 2018-01-09 DIAGNOSIS — J06.9 VIRAL URI WITH COUGH: ICD-10-CM

## 2018-01-09 DIAGNOSIS — J11.1 INFLUENZA: Primary | ICD-10-CM

## 2018-01-09 PROCEDURE — 99213 OFFICE O/P EST LOW 20 MIN: CPT | Performed by: INTERNAL MEDICINE

## 2018-01-09 PROCEDURE — 96372 THER/PROPH/DIAG INJ SC/IM: CPT | Performed by: INTERNAL MEDICINE

## 2018-01-09 RX ORDER — TRIAMCINOLONE ACETONIDE 40 MG/ML
20 INJECTION, SUSPENSION INTRA-ARTICULAR; INTRAMUSCULAR ONCE
Status: COMPLETED | OUTPATIENT
Start: 2018-01-09 | End: 2018-01-09

## 2018-01-09 RX ORDER — METHYLPREDNISOLONE ACETATE 80 MG/ML
80 INJECTION, SUSPENSION INTRA-ARTICULAR; INTRALESIONAL; INTRAMUSCULAR; SOFT TISSUE ONCE
Status: COMPLETED | OUTPATIENT
Start: 2018-01-09 | End: 2018-01-09

## 2018-01-09 RX ORDER — OSELTAMIVIR PHOSPHATE 75 MG/1
75 CAPSULE ORAL
Qty: 10 CAPSULE | Refills: 0 | Status: SHIPPED | OUTPATIENT
Start: 2018-01-09 | End: 2018-01-14

## 2018-01-09 RX ADMIN — METHYLPREDNISOLONE ACETATE 80 MG: 80 INJECTION, SUSPENSION INTRA-ARTICULAR; INTRALESIONAL; INTRAMUSCULAR; SOFT TISSUE at 14:43

## 2018-01-09 RX ADMIN — TRIAMCINOLONE ACETONIDE 20 MG: 40 INJECTION, SUSPENSION INTRA-ARTICULAR; INTRAMUSCULAR at 14:43

## 2018-01-09 NOTE — PROGRESS NOTES
"Subjective        History of Present Illness     Anel Mullen is a 52 y.o. female who reports persistent URI symptoms.  Initial symptoms were mostly GI related with nausea and vomiting, which started last Thursday.  She was also having body aches.  She was seen at [facility] on 01/06/18 where she had a negative influenza screen.  She developed head and chest congestion two days ago after GI symptoms started to resolve.    She continues to have body aches and clear nasal drainage with frequent cough productive of clear sputum.  Denies fever, but has had chills.  She reports ear pain/pressure as well.  She denies specific sick contacts, but has had exposure to influenza with her job as a .  She was given a prescription for Ibuprofen 800 mg, Promethazine DM, and Phenergan at Urgent Care.  She has some Mucinex D at home.       Review of Systems   Constitutional: Positive for chills. Negative for fatigue and fever.   HENT: Positive for congestion, ear pain and postnasal drip. Negative for sinus pressure and sore throat.    Respiratory: Positive for cough and wheezing. Negative for shortness of breath.    Cardiovascular: Negative for chest pain, palpitations and leg swelling.   Gastrointestinal: Negative for abdominal pain, blood in stool, constipation, diarrhea, nausea and vomiting.   Endocrine: Negative for cold intolerance, heat intolerance, polydipsia and polyuria.   Genitourinary: Negative for dysuria, frequency, hematuria and urgency.   Skin: Negative for rash.   Neurological: Negative for syncope and weakness.        Objective     Vitals:    01/09/18 1349   BP: 124/86   Pulse: 84   Temp: 98.7 °F (37.1 °C)   TempSrc: Oral   Weight: 61.2 kg (135 lb)   Height: 157.5 cm (62\")     Physical Exam   Constitutional: She is oriented to person, place, and time. She appears well-developed and well-nourished. No distress.   HENT:   Head: Normocephalic and atraumatic.   Nose: Nose normal.   Mouth/Throat: " Oropharynx is clear and moist. No oropharyngeal exudate.   Left TM slightly injected.  Small effusion on the right.   Mild posterior erythema and clear postnasal drip.  Nasal congestion.      Eyes: EOM are normal. Pupils are equal, round, and reactive to light.   Neck: Neck supple. No JVD present. No thyromegaly present.   Cardiovascular: Normal rate, regular rhythm and normal heart sounds.    Pulmonary/Chest: Effort normal and breath sounds normal. No accessory muscle usage. No respiratory distress. She has no wheezes. She has no rales.   Diminished excursion on expiratory phase of cough with wheezes.    Abdominal: Soft. Bowel sounds are normal. She exhibits no distension. There is no tenderness.   Musculoskeletal: She exhibits no edema.   Lymphadenopathy:     She has no cervical adenopathy.   Neurological: She is alert and oriented to person, place, and time. No cranial nerve deficit.   Psychiatric: She has a normal mood and affect. Her speech is normal and behavior is normal.     Future Appointments  Date Time Provider Department Center   2/6/2018 3:30 PM Rohan Bryan MD [facility] None       Assessment/Plan      Clinically symptoms are consistent with influenza.  She is given a prescription for Tamiflu 75 mg to take b.i.d. X 5 days.  She can use the Mucinex D she has at home twice a day if needed.      Resume use of symbicort and ProAir inhalers.      After informed verbal consent, patient was given Kenalog 20 mg and Depo-Medrol 80 mg IM without difficulty or complications.  Patient tolerated well without complications.       Plenty of rest and increase fluids.    Off work today and tomorrow     Scribed for Dr. Bryan by Mishel Patrick [facility].     Diagnoses and all orders for this visit:    Influenza  -     triamcinolone acetonide (KENALOG-40) injection 20 mg; Inject 0.5 mL into the shoulder, thigh, or buttocks 1 (One) Time.  -     methylPREDNISolone acetate (DEPO-medrol) injection 80 mg; Inject 1 mL into the  shoulder, thigh, or buttocks 1 (One) Time.    Mild intermittent asthma with acute exacerbation  -     triamcinolone acetonide (KENALOG-40) injection 20 mg; Inject 0.5 mL into the shoulder, thigh, or buttocks 1 (One) Time.  -     methylPREDNISolone acetate (DEPO-medrol) injection 80 mg; Inject 1 mL into the shoulder, thigh, or buttocks 1 (One) Time.    Viral URI with cough  -     triamcinolone acetonide (KENALOG-40) injection 20 mg; Inject 0.5 mL into the shoulder, thigh, or buttocks 1 (One) Time.  -     methylPREDNISolone acetate (DEPO-medrol) injection 80 mg; Inject 1 mL into the shoulder, thigh, or buttocks 1 (One) Time.    Other orders  -     oseltamivir (TAMIFLU) 75 MG capsule; Take 1 capsule by mouth 2 (Two) Times a Day for 5 days.      Admission on 01/06/2018, Discharged on 01/06/2018   Component Date Value Ref Range Status   • Rapid Influenza A Ag 01/06/2018 neg   Final   • Rapid Influenza B Ag 01/06/2018 neg   Final   • Internal Control 01/06/2018 Passed  Passed Final   • Lot Number 01/06/2018 [id number]   Final   • Expiration Date 01/06/2018 4/19   Final   ]

## 2018-02-05 ENCOUNTER — LAB (OUTPATIENT)
Dept: LAB | Facility: OTHER | Age: 53
End: 2018-02-05

## 2018-02-05 DIAGNOSIS — Z00.00 ROUTINE GENERAL MEDICAL EXAMINATION AT A HEALTH CARE FACILITY: ICD-10-CM

## 2018-02-05 DIAGNOSIS — E78.5 HYPERLIPIDEMIA, UNSPECIFIED HYPERLIPIDEMIA TYPE: ICD-10-CM

## 2018-02-05 DIAGNOSIS — E78.5 HYPERLIPIDEMIA, UNSPECIFIED HYPERLIPIDEMIA TYPE: Primary | ICD-10-CM

## 2018-02-05 LAB
ALBUMIN SERPL-MCNC: 4.3 G/DL (ref 3.2–5.5)
ALBUMIN/GLOB SERPL: 1.4 G/DL (ref 1–3)
ALP SERPL-CCNC: 41 U/L (ref 15–121)
ALT SERPL W P-5'-P-CCNC: 23 U/L (ref 10–60)
ANION GAP SERPL CALCULATED.3IONS-SCNC: 7 MMOL/L (ref 5–15)
AST SERPL-CCNC: 21 U/L (ref 10–60)
BASOPHILS # BLD AUTO: 0.01 10*3/MM3 (ref 0–0.2)
BASOPHILS NFR BLD AUTO: 0.2 % (ref 0–2)
BILIRUB SERPL-MCNC: 0.7 MG/DL (ref 0.2–1)
BUN BLD-MCNC: 18 MG/DL (ref 8–25)
BUN/CREAT SERPL: 22.5 (ref 7–25)
CALCIUM SPEC-SCNC: 9.1 MG/DL (ref 8.4–10.8)
CHLORIDE SERPL-SCNC: 104 MMOL/L (ref 100–112)
CHOLEST SERPL-MCNC: 168 MG/DL (ref 150–200)
CO2 SERPL-SCNC: 28 MMOL/L (ref 20–32)
CREAT BLD-MCNC: 0.8 MG/DL (ref 0.4–1.3)
DEPRECATED RDW RBC AUTO: 41.8 FL (ref 36.4–46.3)
EOSINOPHIL # BLD AUTO: 0.12 10*3/MM3 (ref 0–0.7)
EOSINOPHIL NFR BLD AUTO: 2.5 % (ref 0–7)
ERYTHROCYTE [DISTWIDTH] IN BLOOD BY AUTOMATED COUNT: 13.6 % (ref 11.5–14.5)
GFR SERPL CREATININE-BSD FRML MDRD: 76 ML/MIN/1.73 (ref 51–120)
GLOBULIN UR ELPH-MCNC: 3.1 GM/DL (ref 2.5–4.6)
GLUCOSE BLD-MCNC: 100 MG/DL (ref 70–100)
HCT VFR BLD AUTO: 41.6 % (ref 35–45)
HDLC SERPL-MCNC: 49 MG/DL (ref 35–100)
HGB BLD-MCNC: 14.1 G/DL (ref 12–15.5)
LDLC SERPL CALC-MCNC: 109 MG/DL
LDLC/HDLC SERPL: 2.23 {RATIO}
LYMPHOCYTES # BLD AUTO: 1.73 10*3/MM3 (ref 0.6–4.2)
LYMPHOCYTES NFR BLD AUTO: 35.7 % (ref 10–50)
MCH RBC QN AUTO: 29.3 PG (ref 26.5–34)
MCHC RBC AUTO-ENTMCNC: 33.9 G/DL (ref 31.4–36)
MCV RBC AUTO: 86.5 FL (ref 80–98)
MONOCYTES # BLD AUTO: 0.32 10*3/MM3 (ref 0–0.9)
MONOCYTES NFR BLD AUTO: 6.6 % (ref 0–12)
NEUTROPHILS # BLD AUTO: 2.67 10*3/MM3 (ref 2–8.6)
NEUTROPHILS NFR BLD AUTO: 55 % (ref 37–80)
PLATELET # BLD AUTO: 209 10*3/MM3 (ref 150–450)
PMV BLD AUTO: 10.1 FL (ref 8–12)
POTASSIUM BLD-SCNC: 3.5 MMOL/L (ref 3.4–5.4)
PROT SERPL-MCNC: 7.4 G/DL (ref 6.7–8.2)
RBC # BLD AUTO: 4.81 10*6/MM3 (ref 3.77–5.16)
SODIUM BLD-SCNC: 139 MMOL/L (ref 134–146)
T4 FREE SERPL-MCNC: 0.99 NG/DL (ref 0.78–2.19)
TRIGL SERPL-MCNC: 48 MG/DL (ref 35–160)
TSH SERPL DL<=0.05 MIU/L-ACNC: 0.19 MIU/ML (ref 0.46–4.68)
VLDLC SERPL-MCNC: 9.6 MG/DL
WBC NRBC COR # BLD: 4.85 10*3/MM3 (ref 3.2–9.8)

## 2018-02-05 PROCEDURE — 80061 LIPID PANEL: CPT | Performed by: INTERNAL MEDICINE

## 2018-02-05 PROCEDURE — 36415 COLL VENOUS BLD VENIPUNCTURE: CPT | Performed by: INTERNAL MEDICINE

## 2018-02-05 PROCEDURE — 85025 COMPLETE CBC W/AUTO DIFF WBC: CPT | Performed by: INTERNAL MEDICINE

## 2018-02-05 PROCEDURE — 84443 ASSAY THYROID STIM HORMONE: CPT | Performed by: INTERNAL MEDICINE

## 2018-02-05 PROCEDURE — 84439 ASSAY OF FREE THYROXINE: CPT | Performed by: INTERNAL MEDICINE

## 2018-02-05 PROCEDURE — 80053 COMPREHEN METABOLIC PANEL: CPT | Performed by: INTERNAL MEDICINE

## 2018-02-06 ENCOUNTER — OFFICE VISIT (OUTPATIENT)
Dept: FAMILY MEDICINE CLINIC | Facility: CLINIC | Age: 53
End: 2018-02-06

## 2018-02-06 VITALS
SYSTOLIC BLOOD PRESSURE: 128 MMHG | TEMPERATURE: 96.7 F | BODY MASS INDEX: 23.92 KG/M2 | DIASTOLIC BLOOD PRESSURE: 70 MMHG | HEIGHT: 62 IN | WEIGHT: 130 LBS | HEART RATE: 74 BPM

## 2018-02-06 DIAGNOSIS — D50.9 IRON DEFICIENCY ANEMIA, UNSPECIFIED IRON DEFICIENCY ANEMIA TYPE: Chronic | ICD-10-CM

## 2018-02-06 DIAGNOSIS — J45.20 MILD INTERMITTENT ASTHMA WITHOUT COMPLICATION: Chronic | ICD-10-CM

## 2018-02-06 DIAGNOSIS — K21.9 GASTROESOPHAGEAL REFLUX DISEASE, ESOPHAGITIS PRESENCE NOT SPECIFIED: Chronic | ICD-10-CM

## 2018-02-06 DIAGNOSIS — E78.2 MIXED HYPERLIPIDEMIA: Primary | Chronic | ICD-10-CM

## 2018-02-06 PROCEDURE — 99214 OFFICE O/P EST MOD 30 MIN: CPT | Performed by: INTERNAL MEDICINE

## 2018-02-06 NOTE — PROGRESS NOTES
"Subjective     History of Present Illness     Anel Mullen is a 53 y.o. female who presents for annual exam.   She reports a couple of flares of asthma symptoms this winter, but use of Symbicort p.r.n. and Albuterol rescue inhaler have helped manage symptoms well.  She is having a current flare of GERD symptoms despite Protonix, but she reports this is rare.  We discussed use of OTC antacids to help with occasional breakthrough symptoms.       She continues on iron supplement q.o.d.  She did not have ferritin with this set of labs.  If she requires lab work during the next six months, we can add a ferritin, otherwise we'll check iron levels with next set of annual labs.  We will also recheck a vitamin B12 level at that time.  Dr. Bhagat performed colonoscopy in 2012.    The patient's relevant past medical, surgical, and social history was reviewed in Epic.   Lab results are reviewed with the patient today.  CBC and CMP unremarkable.  Potassium is trending down slightly.  Liver and renal function normal.  TSH remains only slightly low, but Free T4 level is at goal.        Review of Systems   Constitutional: Negative for chills, fatigue and fever.   HENT: Negative for congestion, ear pain, postnasal drip, sinus pressure and sore throat.    Respiratory: Negative for cough, shortness of breath and wheezing.    Cardiovascular: Negative for chest pain, palpitations and leg swelling.   Gastrointestinal: Negative for abdominal pain, blood in stool, constipation, diarrhea, nausea and vomiting.   Endocrine: Negative for cold intolerance, heat intolerance, polydipsia and polyuria.   Genitourinary: Negative for dysuria, frequency, hematuria and urgency.   Skin: Negative for rash.   Neurological: Negative for syncope and weakness.      Objective     Vitals:    02/06/18 1530   BP: 128/70   Pulse: 74   Temp: 96.7 °F (35.9 °C)   TempSrc: Temporal Artery    Weight: 59 kg (130 lb)   Height: 157.5 cm (62\")     Physical Exam "   Constitutional: She is oriented to person, place, and time. She appears well-developed and well-nourished. No distress.   HENT:   Head: Normocephalic and atraumatic.   Nose: Right sinus exhibits no maxillary sinus tenderness and no frontal sinus tenderness. Left sinus exhibits no maxillary sinus tenderness and no frontal sinus tenderness.   Mouth/Throat: Uvula is midline, oropharynx is clear and moist and mucous membranes are normal. No oral lesions. No tonsillar exudate.   Eyes: Conjunctivae and EOM are normal. Pupils are equal, round, and reactive to light.   Neck: Trachea normal. Neck supple. No JVD present. Carotid bruit is not present. No tracheal deviation present. No thyroid mass and no thyromegaly present.   Cardiovascular: Normal rate, regular rhythm, normal heart sounds and intact distal pulses.   No extrasystoles are present. PMI is not displaced.    No murmur heard.  Pulmonary/Chest: Effort normal and breath sounds normal. No accessory muscle usage. No respiratory distress. She has no decreased breath sounds. She has no wheezes. She has no rhonchi. She has no rales.   Abdominal: Soft. Bowel sounds are normal. She exhibits no distension. There is no hepatosplenomegaly. There is no tenderness.       Vascular Status -  Her exam exhibits right foot vasculature normal. Her exam exhibits no right foot edema. Her exam exhibits left foot vasculature normal. Her exam exhibits no left foot edema.  Lymphadenopathy:     She has no cervical adenopathy.   Neurological: She is alert and oriented to person, place, and time. No cranial nerve deficit. Coordination normal.   Skin: Skin is warm, dry and intact. No rash noted. No cyanosis. Nails show no clubbing.   Psychiatric: She has a normal mood and affect. Her speech is normal and behavior is normal. Judgment and thought content normal.   Vitals reviewed.    Assessment/Plan      Potassium is trending down slightly.  We will continue to monitor.  We discussed foods  high in potassium.  Notify us for any symptoms of hypokalemia including muscle cramps.      Continue with Protonix 40 mg daily.  Recommended OTC antacids for occasional flares of GERD symptoms.  Notify me if flares increase in frequency or intensity.     If she requires labs during the year, we can check ferritin/iron.  Otherwise, we will re-evaluate with annual labs.  Her hemoglobin is now normal.     We will continue to monitor thyroid levels/TSH.    Continue other medications and vitamin and mineral supplements to treat additional medical problems which we addressed today.  Return in one year for annual exam with fasting labs one week prior.       Scribed for Dr. Bryan by Mishel Patrick [facility].     Diagnoses and all orders for this visit:    Mixed hyperlipidemia  -     CBC Auto Differential; Future  -     Comprehensive Metabolic Panel; Future  -     Lipid Panel; Future    Gastroesophageal reflux disease, esophagitis presence not specified    Mild intermittent asthma without complication    Iron deficiency anemia, unspecified iron deficiency anemia type  -     Vitamin B12; Future  -     Ferritin; Future  -     Iron Profile; Future      Lab on 02/05/2018   Component Date Value Ref Range Status   • Free T4 02/05/2018 0.99  0.78 - 2.19 ng/dL Final   • TSH 02/05/2018 0.190* 0.460 - 4.680 mIU/mL Final   • Glucose 02/05/2018 100  70 - 100 mg/dL Final   • BUN 02/05/2018 18  8 - 25 mg/dL Final   • Creatinine 02/05/2018 0.80  0.40 - 1.30 mg/dL Final   • Sodium 02/05/2018 139  134 - 146 mmol/L Final   • Potassium 02/05/2018 3.5  3.4 - 5.4 mmol/L Final   • Chloride 02/05/2018 104  100 - 112 mmol/L Final   • CO2 02/05/2018 28.0  20.0 - 32.0 mmol/L Final   • Calcium 02/05/2018 9.1  8.4 - 10.8 mg/dL Final   • Total Protein 02/05/2018 7.4  6.7 - 8.2 g/dL Final   • Albumin 02/05/2018 4.30  3.20 - 5.50 g/dL Final   • ALT (SGPT) 02/05/2018 23  10 - 60 U/L Final   • AST (SGOT) 02/05/2018 21  10 - 60 U/L Final   • Alkaline  Phosphatase 02/05/2018 41  15 - 121 U/L Final   • Total Bilirubin 02/05/2018 0.7  0.2 - 1.0 mg/dL Final   • eGFR Non African Amer 02/05/2018 76  51 - 120 mL/min/1.73 Final   • Globulin 02/05/2018 3.1  2.5 - 4.6 gm/dL Final   • A/G Ratio 02/05/2018 1.4  1.0 - 3.0 g/dL Final   • BUN/Creatinine Ratio 02/05/2018 22.5  7.0 - 25.0 Final   • Anion Gap 02/05/2018 7.0  5.0 - 15.0 mmol/L Final   • Total Cholesterol 02/05/2018 168  150 - 200 mg/dL Final   • Triglycerides 02/05/2018 48  35 - 160 mg/dL Final   • HDL Cholesterol 02/05/2018 49  35 - 100 mg/dL Final   • LDL Cholesterol  02/05/2018 109  mg/dL Final   • VLDL Cholesterol 02/05/2018 9.6  mg/dL Final   • LDL/HDL Ratio 02/05/2018 2.23   Final   • WBC 02/05/2018 4.85  3.20 - 9.80 10*3/mm3 Final   • RBC 02/05/2018 4.81  3.77 - 5.16 10*6/mm3 Final   • Hemoglobin 02/05/2018 14.1  12.0 - 15.5 g/dL Final   • Hematocrit 02/05/2018 41.6  35.0 - 45.0 % Final   • MCV 02/05/2018 86.5  80.0 - 98.0 fL Final   • MCH 02/05/2018 29.3  26.5 - 34.0 pg Final   • MCHC 02/05/2018 33.9  31.4 - 36.0 g/dL Final   • RDW 02/05/2018 13.6  11.5 - 14.5 % Final   • RDW-SD 02/05/2018 41.8  36.4 - 46.3 fl Final   • MPV 02/05/2018 10.1  8.0 - 12.0 fL Final   • Platelets 02/05/2018 209  150 - 450 10*3/mm3 Final   • Neutrophil % 02/05/2018 55.0  37.0 - 80.0 % Final   • Lymphocyte % 02/05/2018 35.7  10.0 - 50.0 % Final   • Monocyte % 02/05/2018 6.6  0.0 - 12.0 % Final   • Eosinophil % 02/05/2018 2.5  0.0 - 7.0 % Final   • Basophil % 02/05/2018 0.2  0.0 - 2.0 % Final   • Neutrophils, Absolute 02/05/2018 2.67  2.00 - 8.60 10*3/mm3 Final   • Lymphocytes, Absolute 02/05/2018 1.73  0.60 - 4.20 10*3/mm3 Final   • Monocytes, Absolute 02/05/2018 0.32  0.00 - 0.90 10*3/mm3 Final   • Eosinophils, Absolute 02/05/2018 0.12  0.00 - 0.70 10*3/mm3 Final   • Basophils, Absolute 02/05/2018 0.01  0.00 - 0.20 10*3/mm3 Final   ]

## 2018-03-09 RX ORDER — CIPROFLOXACIN 500 MG/1
500 TABLET, FILM COATED ORAL EVERY 12 HOURS SCHEDULED
Qty: 20 TABLET | Refills: 0 | Status: SHIPPED | OUTPATIENT
Start: 2018-03-09 | End: 2018-04-10

## 2018-04-04 RX ORDER — VALACYCLOVIR HYDROCHLORIDE 1 G/1
1000 TABLET, FILM COATED ORAL 2 TIMES DAILY
Qty: 14 TABLET | Refills: 0 | Status: SHIPPED | OUTPATIENT
Start: 2018-04-04 | End: 2018-09-11

## 2018-04-04 NOTE — PROGRESS NOTES
Pt has blisters under the tongue. Improving but extremely painful. Warm salt water swish and spit. Valtrex 1g BID x 7 days. Follow up with PCP if persistent

## 2018-04-10 ENCOUNTER — LAB (OUTPATIENT)
Dept: LAB | Facility: OTHER | Age: 53
End: 2018-04-10

## 2018-04-10 ENCOUNTER — OFFICE VISIT (OUTPATIENT)
Dept: FAMILY MEDICINE CLINIC | Facility: CLINIC | Age: 53
End: 2018-04-10

## 2018-04-10 VITALS
HEART RATE: 68 BPM | BODY MASS INDEX: 23.77 KG/M2 | SYSTOLIC BLOOD PRESSURE: 118 MMHG | DIASTOLIC BLOOD PRESSURE: 70 MMHG | WEIGHT: 129.2 LBS | HEIGHT: 62 IN | TEMPERATURE: 98.1 F

## 2018-04-10 DIAGNOSIS — D50.9 IRON DEFICIENCY ANEMIA, UNSPECIFIED IRON DEFICIENCY ANEMIA TYPE: Chronic | ICD-10-CM

## 2018-04-10 DIAGNOSIS — G25.81 RLS (RESTLESS LEGS SYNDROME): Primary | Chronic | ICD-10-CM

## 2018-04-10 LAB — FERRITIN SERPL-MCNC: 74.8 NG/ML (ref 11.1–264)

## 2018-04-10 PROCEDURE — 99213 OFFICE O/P EST LOW 20 MIN: CPT | Performed by: INTERNAL MEDICINE

## 2018-04-10 PROCEDURE — 36415 COLL VENOUS BLD VENIPUNCTURE: CPT | Performed by: INTERNAL MEDICINE

## 2018-04-10 PROCEDURE — 82728 ASSAY OF FERRITIN: CPT | Performed by: INTERNAL MEDICINE

## 2018-04-10 RX ORDER — IRON ASPGLY,PS/C/B12/FA/CA/SUC 150-25-1
1 CAPSULE ORAL DAILY
Qty: 30 EACH | Refills: 11 | Status: SHIPPED | OUTPATIENT
Start: 2018-04-10 | End: 2019-05-20 | Stop reason: SDUPTHER

## 2018-04-10 RX ORDER — PRAMIPEXOLE DIHYDROCHLORIDE 0.5 MG/1
0.5 TABLET ORAL NIGHTLY
Qty: 30 TABLET | Refills: 11 | Status: SHIPPED | OUTPATIENT
Start: 2018-04-10 | End: 2018-04-19 | Stop reason: SINTOL

## 2018-04-10 NOTE — PROGRESS NOTES
Subjective     History of Present Illness     Anel Mullen is a 53 y.o. female who comes in today reporting restless leg symptoms for the past five months, which are gradually worsening.  She reports the symptoms are more noticeable when lying down to sleep.  Symptoms are improved with standing and walking, but recur when she lies back down.  She describes some symptoms of periodic limb movement.  Her  reports she frequently thrashes around at night.  She reports some hip pain relieved with Tylenol.  She normally goes to bed between 9 and 9:30 p.m.  I recommended starting Mirapex a couple of hours prior to bedtime.   In further discussion, she denies snoring, but does report some daytime somnolence and not feeling completely rested on waking in the morning.  If the Mirapex doesn't improve restless leg symptoms, we will refer for evaluation of sleep apnea.     She has a history of iron deficiency anemia, for which she continues on Ferrex.  We will check a ferritin level today and adjust her dose as needed pending the results.       Review of Systems   Constitutional: Positive for fatigue. Negative for chills and fever.   HENT: Negative for congestion, ear pain, postnasal drip, sinus pressure and sore throat.    Respiratory: Negative for cough, shortness of breath and wheezing.    Cardiovascular: Negative for chest pain, palpitations and leg swelling.   Gastrointestinal: Negative for abdominal pain, blood in stool, constipation, diarrhea, nausea and vomiting.   Endocrine: Negative for cold intolerance, heat intolerance, polydipsia and polyuria.   Genitourinary: Negative for dysuria, frequency, hematuria and urgency.   Skin: Negative for rash.   Neurological: Negative for syncope and weakness.        Restless leg symptoms.    Psychiatric/Behavioral: Positive for sleep disturbance.        Objective     Vitals:    04/10/18 1442   BP: 118/70   Pulse: 68   Temp: 98.1 °F (36.7 °C)   TempSrc: Oral   Weight: 58.6  "kg (129 lb 3.2 oz)   Height: 157.5 cm (62\")     Physical Exam   Constitutional: She is oriented to person, place, and time. She appears well-developed and well-nourished. No distress.   HENT:   Head: Normocephalic and atraumatic.   Nose: Nose normal.   Mouth/Throat: Oropharynx is clear and moist. No oropharyngeal exudate.   No obvious crowding of the oropharynx.    Eyes: EOM are normal. Pupils are equal, round, and reactive to light.   Neck: Neck supple. No JVD present. No thyromegaly present.   Cardiovascular: Normal rate, regular rhythm and normal heart sounds.    Pulmonary/Chest: Effort normal and breath sounds normal. No accessory muscle usage. No respiratory distress. She has no wheezes. She has no rales.   Abdominal: Soft. Bowel sounds are normal. She exhibits no distension. There is no tenderness.   Musculoskeletal: She exhibits no edema.   Lymphadenopathy:     She has no cervical adenopathy.   Neurological: She is alert and oriented to person, place, and time. No cranial nerve deficit.   Psychiatric: She has a normal mood and affect. Her speech is normal and behavior is normal. Judgment and thought content normal.     Future Appointments  Date Time Provider Department Center   2/11/2019 3:30 PM Rohan Bryan MD [facility] None       Assessment/Plan      Start Mirapex 0.5 mg to take 1/2 tablet 1-2 hours before bedtime progressing to the whole tablet for symptom control.   If symptoms persist, we will refer for evaluation of sleep apnea. I recommended exercise such as daily walking.       She will stop by lab to have ferritin checked.  Decrease the Ferrex to 1 tablet twice weekly.      Scribed for Dr. Bryan by Mishel Patrick [facility].     Diagnoses and all orders for this visit:    RLS (restless legs syndrome)    Iron deficiency anemia, unspecified iron deficiency anemia type  -     Ferritin; Future    Other orders  -     pramipexole (MIRAPEX) 0.5 MG tablet; Take 1 tablet by mouth Every Night. 1-2 hrs prior " to bedtime  -     Fe-Succ Ac-C-Thre Ac-B12-FA (FERREX 150 FORTE PLUS)  MG capsule capsule; Take 1 capsule by mouth Daily.        No visits with results within 3 Week(s) from this visit.   Latest known visit with results is:   Lab on 02/05/2018   Component Date Value Ref Range Status   • Free T4 02/05/2018 0.99  0.78 - 2.19 ng/dL Final   • TSH 02/05/2018 0.190* 0.460 - 4.680 mIU/mL Final   • Glucose 02/05/2018 100  70 - 100 mg/dL Final   • BUN 02/05/2018 18  8 - 25 mg/dL Final   • Creatinine 02/05/2018 0.80  0.40 - 1.30 mg/dL Final   • Sodium 02/05/2018 139  134 - 146 mmol/L Final   • Potassium 02/05/2018 3.5  3.4 - 5.4 mmol/L Final   • Chloride 02/05/2018 104  100 - 112 mmol/L Final   • CO2 02/05/2018 28.0  20.0 - 32.0 mmol/L Final   • Calcium 02/05/2018 9.1  8.4 - 10.8 mg/dL Final   • Total Protein 02/05/2018 7.4  6.7 - 8.2 g/dL Final   • Albumin 02/05/2018 4.30  3.20 - 5.50 g/dL Final   • ALT (SGPT) 02/05/2018 23  10 - 60 U/L Final   • AST (SGOT) 02/05/2018 21  10 - 60 U/L Final   • Alkaline Phosphatase 02/05/2018 41  15 - 121 U/L Final   • Total Bilirubin 02/05/2018 0.7  0.2 - 1.0 mg/dL Final   • eGFR Non African Amer 02/05/2018 76  51 - 120 mL/min/1.73 Final   • Globulin 02/05/2018 3.1  2.5 - 4.6 gm/dL Final   • A/G Ratio 02/05/2018 1.4  1.0 - 3.0 g/dL Final   • BUN/Creatinine Ratio 02/05/2018 22.5  7.0 - 25.0 Final   • Anion Gap 02/05/2018 7.0  5.0 - 15.0 mmol/L Final   • Total Cholesterol 02/05/2018 168  150 - 200 mg/dL Final   • Triglycerides 02/05/2018 48  35 - 160 mg/dL Final   • HDL Cholesterol 02/05/2018 49  35 - 100 mg/dL Final   • LDL Cholesterol  02/05/2018 109  mg/dL Final   • VLDL Cholesterol 02/05/2018 9.6  mg/dL Final   • LDL/HDL Ratio 02/05/2018 2.23   Final   • WBC 02/05/2018 4.85  3.20 - 9.80 10*3/mm3 Final   • RBC 02/05/2018 4.81  3.77 - 5.16 10*6/mm3 Final   • Hemoglobin 02/05/2018 14.1  12.0 - 15.5 g/dL Final   • Hematocrit 02/05/2018 41.6  35.0 - 45.0 % Final   • MCV 02/05/2018 86.5   80.0 - 98.0 fL Final   • MCH 02/05/2018 29.3  26.5 - 34.0 pg Final   • MCHC 02/05/2018 33.9  31.4 - 36.0 g/dL Final   • RDW 02/05/2018 13.6  11.5 - 14.5 % Final   • RDW-SD 02/05/2018 41.8  36.4 - 46.3 fl Final   • MPV 02/05/2018 10.1  8.0 - 12.0 fL Final   • Platelets 02/05/2018 209  150 - 450 10*3/mm3 Final   • Neutrophil % 02/05/2018 55.0  37.0 - 80.0 % Final   • Lymphocyte % 02/05/2018 35.7  10.0 - 50.0 % Final   • Monocyte % 02/05/2018 6.6  0.0 - 12.0 % Final   • Eosinophil % 02/05/2018 2.5  0.0 - 7.0 % Final   • Basophil % 02/05/2018 0.2  0.0 - 2.0 % Final   • Neutrophils, Absolute 02/05/2018 2.67  2.00 - 8.60 10*3/mm3 Final   • Lymphocytes, Absolute 02/05/2018 1.73  0.60 - 4.20 10*3/mm3 Final   • Monocytes, Absolute 02/05/2018 0.32  0.00 - 0.90 10*3/mm3 Final   • Eosinophils, Absolute 02/05/2018 0.12  0.00 - 0.70 10*3/mm3 Final   • Basophils, Absolute 02/05/2018 0.01  0.00 - 0.20 10*3/mm3 Final   ]

## 2018-04-16 DIAGNOSIS — N30.90 CYSTITIS: Primary | ICD-10-CM

## 2018-04-16 PROCEDURE — 87086 URINE CULTURE/COLONY COUNT: CPT | Performed by: NURSE PRACTITIONER

## 2018-04-16 RX ORDER — NITROFURANTOIN 25; 75 MG/1; MG/1
100 CAPSULE ORAL 2 TIMES DAILY
Qty: 20 CAPSULE | Refills: 0 | Status: SHIPPED | OUTPATIENT
Start: 2018-04-16 | End: 2018-07-03 | Stop reason: SDUPTHER

## 2018-04-16 NOTE — PROGRESS NOTES
UA positive for infection. Sex seems to be a trigger for recurrent UTIs. Culture ordered. Macrobid BID x 5 days then once after each act of intercourse

## 2018-04-18 LAB
BACTERIA SPEC AEROBE CULT: NORMAL
BACTERIA SPEC AEROBE CULT: NORMAL

## 2018-04-19 RX ORDER — ROPINIROLE 0.5 MG/1
0.5 TABLET, FILM COATED ORAL NIGHTLY
Qty: 30 TABLET | Refills: 11 | Status: SHIPPED | OUTPATIENT
Start: 2018-04-19 | End: 2018-09-13

## 2018-04-19 RX ORDER — PANTOPRAZOLE SODIUM 40 MG/1
TABLET, DELAYED RELEASE ORAL
Qty: 90 TABLET | Refills: 3 | Status: SHIPPED | OUTPATIENT
Start: 2018-04-19 | End: 2019-01-28 | Stop reason: SDUPTHER

## 2018-04-23 ENCOUNTER — TELEPHONE (OUTPATIENT)
Dept: FAMILY MEDICINE CLINIC | Facility: CLINIC | Age: 53
End: 2018-04-23

## 2018-04-23 NOTE — TELEPHONE ENCOUNTER
----- Message from Rohan Bryan MD sent at 4/19/2018 12:57 PM CDT -----  I spoke to the patient and discontinued Mirapex and started Requip, 4/19/2018.  EB  ----- Message -----  From: Rakesh Salguero RN  Sent: 4/19/2018   8:15 AM  To: Rohan Bryan MD    She was recently placed on Mirapex and since has caused nausea and vomiting. TP

## 2018-04-24 DIAGNOSIS — Z12.31 SCREENING MAMMOGRAM, ENCOUNTER FOR: Primary | ICD-10-CM

## 2018-06-06 ENCOUNTER — LAB (OUTPATIENT)
Dept: LAB | Facility: OTHER | Age: 53
End: 2018-06-06

## 2018-06-06 DIAGNOSIS — Z91.013 SHELLFISH ALLERGY: Primary | ICD-10-CM

## 2018-06-06 DIAGNOSIS — Z91.013 SHELLFISH ALLERGY: ICD-10-CM

## 2018-06-06 DIAGNOSIS — Z91.018 FOOD ALLERGY: Primary | ICD-10-CM

## 2018-06-06 PROCEDURE — 86003 ALLG SPEC IGE CRUDE XTRC EA: CPT | Performed by: INTERNAL MEDICINE

## 2018-06-06 PROCEDURE — 36415 COLL VENOUS BLD VENIPUNCTURE: CPT | Performed by: INTERNAL MEDICINE

## 2018-06-10 LAB
CLAM IGE QN: <0.1 KU/L
CONV CLASS DESCRIPTION: ABNORMAL
CRAB IGE QN: 7.1 KU/L
LOBSTER IGE QN: 4.82 KU/L
OYSTER IGE QN: <0.1 KU/L
SCALLOP IGE QN: 0.85 KU/L
SHRIMP IGE: 20.3 KU/L

## 2018-06-14 ENCOUNTER — OFFICE VISIT (OUTPATIENT)
Dept: FAMILY MEDICINE CLINIC | Facility: CLINIC | Age: 53
End: 2018-06-14

## 2018-06-14 VITALS
BODY MASS INDEX: 23.74 KG/M2 | SYSTOLIC BLOOD PRESSURE: 120 MMHG | DIASTOLIC BLOOD PRESSURE: 70 MMHG | WEIGHT: 129 LBS | HEIGHT: 62 IN

## 2018-06-14 DIAGNOSIS — M54.50 ACUTE LEFT-SIDED LOW BACK PAIN WITHOUT SCIATICA: Primary | ICD-10-CM

## 2018-06-14 PROCEDURE — 99213 OFFICE O/P EST LOW 20 MIN: CPT | Performed by: FAMILY MEDICINE

## 2018-06-14 NOTE — PROGRESS NOTES
Subjective   Anel Mullen is a 53 y.o. female who presents to the office with about a weeklong history of pain in the left lower back.  She says it hurts to bend or move and she does have to do a lot of bending and straining on her job.  It's worse when she gets home in the evenings but does hurt during the day as well.  It started more in the middle and has radiated around the left side but there's been no rash or fever.  She's been using over-the-counter Advil with some minimal relief of symptoms.    History of Present Illness     The following portions of the patient's history were reviewed and updated as appropriate: allergies, current medications, past family history, past medical history, past social history, past surgical history and problem list.    Review of Systems   Constitutional: Negative for chills, fatigue and fever.   HENT: Negative for congestion, ear pain, postnasal drip, sinus pressure and sore throat.    Respiratory: Negative for cough, shortness of breath and wheezing.    Cardiovascular: Negative for chest pain, palpitations and leg swelling.   Gastrointestinal: Negative for abdominal pain, blood in stool, constipation, diarrhea, nausea and vomiting.   Endocrine: Negative for cold intolerance, heat intolerance, polydipsia and polyuria.   Genitourinary: Negative for dysuria, frequency, hematuria and urgency.   Skin: Negative for rash.   Neurological: Negative for syncope and weakness.   All other systems reviewed and are negative.      Objective   Physical Exam   Constitutional: She is oriented to person, place, and time. She appears well-developed and well-nourished.   HENT:   Head: Normocephalic and atraumatic.   Nose: Nose normal.   Mouth/Throat: Oropharynx is clear and moist.   Eyes: Conjunctivae and EOM are normal. Pupils are equal, round, and reactive to light.   Neck: Normal range of motion. Neck supple. No JVD present. No tracheal deviation present. No thyromegaly present.    Cardiovascular: Normal rate, regular rhythm, normal heart sounds and intact distal pulses.    No murmur heard.  Pulmonary/Chest: Effort normal and breath sounds normal. She has no wheezes.   Abdominal: Soft. Bowel sounds are normal. She exhibits no distension. There is no tenderness.   Musculoskeletal: Normal range of motion. She exhibits no edema.   Tender in low back, L>R   Lymphadenopathy:     She has no cervical adenopathy.   Neurological: She is alert and oriented to person, place, and time. Coordination normal.   Skin: Skin is warm and dry. No rash noted.   Psychiatric: She has a normal mood and affect.   Nursing note and vitals reviewed.  Study Result        PROCEDURE: AP and lateral lumbar spine     COMPARISON: No comparison     HISTORY: new onset pain mid low back, radiates mostly to left  side, M54.5 Low back pain.     FINDINGS: There is slightly exaggerated lumbar lordosis. There is  a mild leftward curvature of the lower lumbar spine. Alignment is  otherwise normal. Vertebral body height within normal limits.  There is mild disc space narrowing at L3-L4 at L5-S1. No  radiographic evidence of acute fracture is seen.     IMPRESSION:  CONCLUSION:    Mild disc space narrowing at L3-L4 and L5-S1.  Mild leftward curvature of the lower lumbar spine.  Slightly exaggerated lumbar lordosis.     Electronically signed by:  Luis Enrique Cannon MD  6/14/2018 11:25 AM  CDT Workstation: [id number]     Assessment/Plan   Anel was seen today for back pain.    Diagnoses and all orders for this visit:    Acute left-sided low back pain without sciatica  -     XR Spine Lumbar 2 or 3 View; Future    X-ray findings as above.  Suspect the combination of the curvature in the disc space narrowing could be causing the pain.  We'll try ibuprofen 800 mg, Duexis samples are given with instructions for use.  If not improving within 4-5 days on the treatment she is to contact me.        This document has been electronically signed by Siobhan  MD Helen on June 14, 2018 12:20 PM

## 2018-06-15 RX ORDER — IBUPROFEN AND FAMOTIDINE 26.6; 8 MG/1; MG/1
1 TABLET, FILM COATED ORAL 3 TIMES DAILY
Qty: 90 TABLET | Refills: 5 | Status: SHIPPED | OUTPATIENT
Start: 2018-06-15 | End: 2020-03-23 | Stop reason: SDUPTHER

## 2018-06-18 RX ORDER — METHYLPREDNISOLONE 4 MG/1
TABLET ORAL
Qty: 21 TABLET | Refills: 0 | Status: SHIPPED | OUTPATIENT
Start: 2018-06-18 | End: 2018-09-11

## 2018-06-19 RX ORDER — TRIAMCINOLONE ACETONIDE 1 MG/G
CREAM TOPICAL 3 TIMES DAILY PRN
Qty: 75 G | Refills: 5 | Status: SHIPPED | OUTPATIENT
Start: 2018-06-19

## 2018-07-03 DIAGNOSIS — N30.90 CYSTITIS: ICD-10-CM

## 2018-07-03 RX ORDER — NITROFURANTOIN 25; 75 MG/1; MG/1
100 CAPSULE ORAL 2 TIMES DAILY
Qty: 20 CAPSULE | Refills: 0 | Status: SHIPPED | OUTPATIENT
Start: 2018-07-03 | End: 2018-08-06 | Stop reason: SDUPTHER

## 2018-07-23 RX ORDER — MEDROXYPROGESTERONE ACETATE 10 MG/1
10 TABLET ORAL DAILY
Qty: 30 TABLET | Refills: 5 | Status: SHIPPED | OUTPATIENT
Start: 2018-07-23 | End: 2019-02-11

## 2018-08-06 DIAGNOSIS — N30.90 CYSTITIS: ICD-10-CM

## 2018-08-06 RX ORDER — NITROFURANTOIN 25; 75 MG/1; MG/1
100 CAPSULE ORAL 2 TIMES DAILY
Qty: 28 CAPSULE | Refills: 0 | Status: SHIPPED | OUTPATIENT
Start: 2018-08-06 | End: 2018-08-20

## 2018-08-24 RX ORDER — ATORVASTATIN CALCIUM 20 MG/1
TABLET, FILM COATED ORAL
Qty: 90 TABLET | Refills: 3 | Status: SHIPPED | OUTPATIENT
Start: 2018-08-24 | End: 2019-06-24 | Stop reason: SDUPTHER

## 2018-09-11 ENCOUNTER — OFFICE VISIT (OUTPATIENT)
Dept: FAMILY MEDICINE CLINIC | Facility: CLINIC | Age: 53
End: 2018-09-11

## 2018-09-11 VITALS
WEIGHT: 136 LBS | BODY MASS INDEX: 25.03 KG/M2 | HEIGHT: 62 IN | SYSTOLIC BLOOD PRESSURE: 128 MMHG | TEMPERATURE: 98.2 F | HEART RATE: 88 BPM | DIASTOLIC BLOOD PRESSURE: 80 MMHG

## 2018-09-11 DIAGNOSIS — M13.0 POLYARTHRITIS: Primary | ICD-10-CM

## 2018-09-11 DIAGNOSIS — R79.89 ELEVATED LFTS: ICD-10-CM

## 2018-09-11 DIAGNOSIS — M72.0 DUPUYTREN'S CONTRACTURE OF BOTH HANDS: Chronic | ICD-10-CM

## 2018-09-11 DIAGNOSIS — M70.61 GREATER TROCHANTERIC BURSITIS OF RIGHT HIP: ICD-10-CM

## 2018-09-11 DIAGNOSIS — G25.81 RESTLESS LEGS SYNDROME (RLS): Chronic | ICD-10-CM

## 2018-09-11 PROCEDURE — 96372 THER/PROPH/DIAG INJ SC/IM: CPT | Performed by: INTERNAL MEDICINE

## 2018-09-11 PROCEDURE — 99214 OFFICE O/P EST MOD 30 MIN: CPT | Performed by: INTERNAL MEDICINE

## 2018-09-11 RX ORDER — TRIAMCINOLONE ACETONIDE 40 MG/ML
20 INJECTION, SUSPENSION INTRA-ARTICULAR; INTRAMUSCULAR ONCE
Status: COMPLETED | OUTPATIENT
Start: 2018-09-11 | End: 2018-09-11

## 2018-09-11 RX ORDER — METHYLPREDNISOLONE ACETATE 80 MG/ML
80 INJECTION, SUSPENSION INTRA-ARTICULAR; INTRALESIONAL; INTRAMUSCULAR; SOFT TISSUE ONCE
Status: COMPLETED | OUTPATIENT
Start: 2018-09-11 | End: 2018-09-11

## 2018-09-11 RX ADMIN — TRIAMCINOLONE ACETONIDE 20 MG: 40 INJECTION, SUSPENSION INTRA-ARTICULAR; INTRAMUSCULAR at 16:45

## 2018-09-11 RX ADMIN — METHYLPREDNISOLONE ACETATE 80 MG: 80 INJECTION, SUSPENSION INTRA-ARTICULAR; INTRALESIONAL; INTRAMUSCULAR; SOFT TISSUE at 16:45

## 2018-09-11 NOTE — PROGRESS NOTES
Subjective        History of Present Illness     Anel Mullen (Kamille) is a 53 y.o. female who presents with arthralgias, especially in the hands for the past two weeks.  Exam of the hands reveals tenderness bilateral PIP joints and development of Dupuytren's contractures in bilateral hands involving 3rd and 4th digits of bilateral hands.  She doesn't feel like her symptoms warrant surgical evaluation currently.  Kamille has a history of carpel tunnel release to bilateral wrists, which was successful.      She also continues to have restless leg symptoms, especially when she gets home from work in the evening, despite taking Mirapex.  I recommended she take 1/2 tablet of the Mirapex when she gets home in the afternoon, taking the remaining half tablet prior to bedtime to help with her early evening symptoms.  She will notify me if this does not provide adequate relief.    She reports hip pain and tenderness, right more than left.  She has mild right greater trochanter tenderness and right SI tenderness on exam.  She uses Ibuprofen 800 mg to help with the hip pain.     She continues on iron supplement twice weekly for iron deficiency anemia.     Addendum: Some of her labs are back.  CMP reveals elevated LFTs.     Review of Systems   Constitutional: Negative for chills, fatigue and fever.   HENT: Negative for congestion, ear pain, postnasal drip, sinus pressure and sore throat.    Respiratory: Negative for cough, shortness of breath and wheezing.    Cardiovascular: Negative for chest pain, palpitations and leg swelling.   Gastrointestinal: Negative for abdominal pain, blood in stool, constipation, diarrhea, nausea and vomiting.   Endocrine: Negative for cold intolerance, heat intolerance, polydipsia and polyuria.   Genitourinary: Negative for dysuria, frequency, hematuria and urgency.   Musculoskeletal: Positive for arthralgias.   Skin: Negative for rash.   Neurological: Negative for syncope and weakness.     "    Objective     Vitals:    09/11/18 1608   BP: 128/80   Pulse: 88   Temp: 98.2 °F (36.8 °C)   TempSrc: Oral   Weight: 61.7 kg (136 lb)   Height: 157.5 cm (62\")     Physical Exam   Constitutional: She is oriented to person, place, and time. She appears well-developed and well-nourished. No distress.   HENT:   Head: Normocephalic and atraumatic.   Nose: Nose normal.   Mouth/Throat: Oropharynx is clear and moist. No oropharyngeal exudate.   Eyes: Pupils are equal, round, and reactive to light. EOM are normal.   Neck: Neck supple. No JVD present. No thyromegaly present.   Cardiovascular: Normal rate, regular rhythm and normal heart sounds.    Pulmonary/Chest: Effort normal and breath sounds normal. No accessory muscle usage. No respiratory distress. She has no wheezes. She has no rales.   Abdominal: Soft. Bowel sounds are normal. She exhibits no distension. There is no tenderness.   Musculoskeletal: She exhibits no edema.   Exam reveals tenderness bilateral PIP joints and development of Dupuytren's contractures in bilateral hands involving 3rd and 4th digits of bilateral hands.     Exam of the hip reveals right greater trochanter tenderness and right SI tenderness.           Lymphadenopathy:     She has no cervical adenopathy.   Neurological: She is alert and oriented to person, place, and time. No cranial nerve deficit.   Psychiatric: She has a normal mood and affect. Her speech is normal and behavior is normal. Judgment and thought content normal.     Future Appointments  Date Time Provider Department Center   2/11/2019 3:30 PM Rohan Bryan MD [facility] None         Assessment/Plan      We will obtain arthritis panel as well as CBC and CMP.  She is given Kenalog 20 and Depo-Medrol 80 mg IM without difficulty or complications.  She tolerated well.      I recommended she take Mirapex 1/2 tablet when she gets home in the evening and the remaining half prior to bedtime to see if this gives more consistent control of " her restless leg symptoms, especially her early evening symptoms.     We instructed her to continue with the Ibuprofen for the hip pain, but with the elevated LFTs, we will want to try to keep NSAID use and a minimum.  We will check with the patient to see if she is taking herbal medications or other supplements that are not on her medication list.    Repeat CMP and approximate 3 weeks to reassess the elevated LFTs.    Also, return in February for her routine follow up with fasting labs one week prior.     Scribed for Dr. Bryan by Mishel Patrick [facility].     Diagnoses and all orders for this visit:    Polyarthritis  -     SHASHANK w/Reflex; Future  -     Cyclic citrul peptide antibody, IgG/IgA; Future  -     CK; Future  -     C-reactive protein; Future  -     Rheumatoid factor; Future  -     Sedimentation rate; Future  -     Comprehensive Metabolic Panel; Future  -     CBC Auto Differential; Future  -     methylPREDNISolone acetate (DEPO-medrol) injection 80 mg; Inject 1 mL into the appropriate muscle as directed by prescriber 1 (One) Time.  -     triamcinolone acetonide (KENALOG-40) injection 20 mg; Inject 0.5 mL into the appropriate muscle as directed by prescriber 1 (One) Time.    Dupuytren's contracture of both hands    Greater trochanteric bursitis of right hip    Restless legs syndrome (RLS)    Elevated LFTs        No visits with results within 3 Week(s) from this visit.   Latest known visit with results is:   Lab on 06/06/2018   Component Date Value Ref Range Status   • Class Description 06/06/2018 Comment   Final        Levels of Specific IgE       Class  Description of Class      ---------------------------  -----  --------------------                     < 0.10         0         Negative             0.10 -    0.31         0/I       Equivocal/Low             0.32 -    0.55         I         Low             0.56 -    1.40         II        Moderate             1.41 -    3.90         III       High              3.91 -   19.00         IV        Very High            19.01 -  100.00         V         Very High                    >100.00         VI        Very High   • Clams 06/06/2018 <0.10  Class 0 kU/L Final   • Crab 06/06/2018 7.10* Class IV kU/L Final   • Shrimp 06/06/2018 20.30* Class V kU/L Final   • Scallop 06/06/2018 0.85* Class II kU/L Final   • Oyster 06/06/2018 <0.10  Class 0 kU/L Final   • Lobster 06/06/2018 4.82* Class IV kU/L Final   ]

## 2018-09-12 ENCOUNTER — LAB (OUTPATIENT)
Dept: LAB | Facility: OTHER | Age: 53
End: 2018-09-12

## 2018-09-12 DIAGNOSIS — M13.0 POLYARTHRITIS: ICD-10-CM

## 2018-09-12 LAB
ALBUMIN SERPL-MCNC: 4.5 G/DL (ref 3.5–5)
ALBUMIN/GLOB SERPL: 1.4 G/DL (ref 1.1–1.8)
ALP SERPL-CCNC: 48 U/L (ref 38–126)
ALT SERPL W P-5'-P-CCNC: 97 U/L
ANION GAP SERPL CALCULATED.3IONS-SCNC: 8 MMOL/L (ref 5–15)
AST SERPL-CCNC: 54 U/L (ref 14–36)
BASOPHILS # BLD AUTO: 0.01 10*3/MM3 (ref 0–0.2)
BASOPHILS NFR BLD AUTO: 0.2 % (ref 0–2)
BILIRUB SERPL-MCNC: 0.9 MG/DL (ref 0.2–1.3)
BUN BLD-MCNC: 16 MG/DL (ref 7–17)
BUN/CREAT SERPL: 18.2 (ref 7–25)
CALCIUM SPEC-SCNC: 9.5 MG/DL (ref 8.4–10.2)
CHLORIDE SERPL-SCNC: 107 MMOL/L (ref 98–107)
CK SERPL-CCNC: 62 U/L (ref 30–135)
CO2 SERPL-SCNC: 26 MMOL/L (ref 22–30)
CREAT BLD-MCNC: 0.88 MG/DL (ref 0.52–1.04)
CRP SERPL-MCNC: <0.5 MG/DL (ref 0–1)
DEPRECATED RDW RBC AUTO: 40.8 FL (ref 36.4–46.3)
EOSINOPHIL # BLD AUTO: 0.13 10*3/MM3 (ref 0–0.7)
EOSINOPHIL NFR BLD AUTO: 2.4 % (ref 0–7)
ERYTHROCYTE [DISTWIDTH] IN BLOOD BY AUTOMATED COUNT: 12.6 % (ref 11.5–14.5)
ERYTHROCYTE [SEDIMENTATION RATE] IN BLOOD: 1 MM/HR (ref 0–20)
GFR SERPL CREATININE-BSD FRML MDRD: 68 ML/MIN/1.73 (ref 51–120)
GLOBULIN UR ELPH-MCNC: 3.3 GM/DL (ref 2.3–3.5)
GLUCOSE BLD-MCNC: 106 MG/DL (ref 74–99)
HCT VFR BLD AUTO: 40.8 % (ref 35–45)
HGB BLD-MCNC: 13.8 G/DL (ref 12–15.5)
LYMPHOCYTES # BLD AUTO: 1.38 10*3/MM3 (ref 0.6–4.2)
LYMPHOCYTES NFR BLD AUTO: 26 % (ref 10–50)
MCH RBC QN AUTO: 30.7 PG (ref 26.5–34)
MCHC RBC AUTO-ENTMCNC: 33.8 G/DL (ref 31.4–36)
MCV RBC AUTO: 90.7 FL (ref 80–98)
MONOCYTES # BLD AUTO: 0.38 10*3/MM3 (ref 0–0.9)
MONOCYTES NFR BLD AUTO: 7.2 % (ref 0–12)
NEUTROPHILS # BLD AUTO: 3.41 10*3/MM3 (ref 2–8.6)
NEUTROPHILS NFR BLD AUTO: 64.2 % (ref 37–80)
PLATELET # BLD AUTO: 196 10*3/MM3 (ref 150–450)
PMV BLD AUTO: 9.3 FL (ref 8–12)
POTASSIUM BLD-SCNC: 4.6 MMOL/L (ref 3.4–5)
PROT SERPL-MCNC: 7.8 G/DL (ref 6.3–8.2)
RBC # BLD AUTO: 4.5 10*6/MM3 (ref 3.77–5.16)
RHEUMATOID FACT SERPL-ACNC: NEGATIVE [IU]/ML
SODIUM BLD-SCNC: 141 MMOL/L (ref 137–145)
WBC NRBC COR # BLD: 5.31 10*3/MM3 (ref 3.2–9.8)

## 2018-09-12 PROCEDURE — 36415 COLL VENOUS BLD VENIPUNCTURE: CPT | Performed by: INTERNAL MEDICINE

## 2018-09-12 PROCEDURE — 80053 COMPREHEN METABOLIC PANEL: CPT | Performed by: INTERNAL MEDICINE

## 2018-09-12 PROCEDURE — 86430 RHEUMATOID FACTOR TEST QUAL: CPT | Performed by: INTERNAL MEDICINE

## 2018-09-12 PROCEDURE — 86038 ANTINUCLEAR ANTIBODIES: CPT | Performed by: INTERNAL MEDICINE

## 2018-09-12 PROCEDURE — 86140 C-REACTIVE PROTEIN: CPT | Performed by: INTERNAL MEDICINE

## 2018-09-12 PROCEDURE — 86200 CCP ANTIBODY: CPT | Performed by: INTERNAL MEDICINE

## 2018-09-12 PROCEDURE — 82550 ASSAY OF CK (CPK): CPT | Performed by: INTERNAL MEDICINE

## 2018-09-12 PROCEDURE — 85025 COMPLETE CBC W/AUTO DIFF WBC: CPT | Performed by: INTERNAL MEDICINE

## 2018-09-12 PROCEDURE — 85651 RBC SED RATE NONAUTOMATED: CPT | Performed by: INTERNAL MEDICINE

## 2018-09-13 DIAGNOSIS — R79.89 ELEVATED LFTS: Primary | ICD-10-CM

## 2018-09-13 LAB
ANA SER QL: NEGATIVE
CCP IGA+IGG SERPL IA-ACNC: 11 UNITS (ref 0–19)

## 2018-09-13 RX ORDER — ROPINIROLE 0.5 MG/1
0.5 TABLET, FILM COATED ORAL NIGHTLY
Qty: 60 TABLET | Refills: 11 | Status: SHIPPED | OUTPATIENT
Start: 2018-09-13 | End: 2018-09-13 | Stop reason: SDUPTHER

## 2018-09-13 RX ORDER — ROPINIROLE 0.5 MG/1
0.5 TABLET, FILM COATED ORAL NIGHTLY
Qty: 60 TABLET | Refills: 11 | Status: SHIPPED | OUTPATIENT
Start: 2018-09-13 | End: 2018-12-14

## 2018-09-25 ENCOUNTER — LAB (OUTPATIENT)
Dept: LAB | Facility: OTHER | Age: 53
End: 2018-09-25

## 2018-09-25 DIAGNOSIS — R79.89 ELEVATED LFTS: ICD-10-CM

## 2018-09-25 LAB
ALBUMIN SERPL-MCNC: 4.7 G/DL (ref 3.5–5)
ALBUMIN/GLOB SERPL: 1.3 G/DL (ref 1.1–1.8)
ALP SERPL-CCNC: 53 U/L (ref 38–126)
ALT SERPL W P-5'-P-CCNC: 69 U/L
ANION GAP SERPL CALCULATED.3IONS-SCNC: 9 MMOL/L (ref 5–15)
AST SERPL-CCNC: 45 U/L (ref 14–36)
BILIRUB SERPL-MCNC: 0.8 MG/DL (ref 0.2–1.3)
BUN BLD-MCNC: 21 MG/DL (ref 7–17)
BUN/CREAT SERPL: 25 (ref 7–25)
CALCIUM SPEC-SCNC: 9.8 MG/DL (ref 8.4–10.2)
CHLORIDE SERPL-SCNC: 104 MMOL/L (ref 98–107)
CO2 SERPL-SCNC: 28 MMOL/L (ref 22–30)
CREAT BLD-MCNC: 0.84 MG/DL (ref 0.52–1.04)
GFR SERPL CREATININE-BSD FRML MDRD: 71 ML/MIN/1.73 (ref 51–120)
GLOBULIN UR ELPH-MCNC: 3.6 GM/DL (ref 2.3–3.5)
GLUCOSE BLD-MCNC: 100 MG/DL (ref 74–99)
POTASSIUM BLD-SCNC: 4.3 MMOL/L (ref 3.4–5)
PROT SERPL-MCNC: 8.3 G/DL (ref 6.3–8.2)
SODIUM BLD-SCNC: 141 MMOL/L (ref 137–145)

## 2018-09-25 PROCEDURE — 36415 COLL VENOUS BLD VENIPUNCTURE: CPT | Performed by: INTERNAL MEDICINE

## 2018-09-25 PROCEDURE — 80053 COMPREHEN METABOLIC PANEL: CPT | Performed by: INTERNAL MEDICINE

## 2018-09-26 ENCOUNTER — TELEPHONE (OUTPATIENT)
Dept: FAMILY MEDICINE CLINIC | Facility: CLINIC | Age: 53
End: 2018-09-26

## 2018-09-26 DIAGNOSIS — R94.5 ABNORMAL LIVER FUNCTION: Primary | ICD-10-CM

## 2018-09-26 NOTE — TELEPHONE ENCOUNTER
09/26/2018 relayed message and instructions. TP      ----- Message from Rohan Bryan MD sent at 9/26/2018  4:23 PM CDT -----  Notify the patient that her LFTs are improved, but not back down to baseline.  Continue to with hold the hormone therapy and continue to avoid NSAIDs.  Repeat CMP in 1 month to be sure this normalizes.  EB

## 2018-11-06 ENCOUNTER — OFFICE VISIT (OUTPATIENT)
Dept: FAMILY MEDICINE CLINIC | Facility: CLINIC | Age: 53
End: 2018-11-06

## 2018-11-06 VITALS
TEMPERATURE: 97.8 F | SYSTOLIC BLOOD PRESSURE: 132 MMHG | BODY MASS INDEX: 23.55 KG/M2 | HEART RATE: 72 BPM | WEIGHT: 128 LBS | HEIGHT: 62 IN | DIASTOLIC BLOOD PRESSURE: 70 MMHG

## 2018-11-06 DIAGNOSIS — J45.21 MILD INTERMITTENT ASTHMA WITH ACUTE EXACERBATION: ICD-10-CM

## 2018-11-06 DIAGNOSIS — J20.9 ACUTE BRONCHITIS, UNSPECIFIED ORGANISM: Primary | ICD-10-CM

## 2018-11-06 DIAGNOSIS — R79.89 ELEVATED LFTS: ICD-10-CM

## 2018-11-06 DIAGNOSIS — J01.00 ACUTE MAXILLARY SINUSITIS, RECURRENCE NOT SPECIFIED: ICD-10-CM

## 2018-11-06 PROCEDURE — 96372 THER/PROPH/DIAG INJ SC/IM: CPT | Performed by: INTERNAL MEDICINE

## 2018-11-06 PROCEDURE — 99214 OFFICE O/P EST MOD 30 MIN: CPT | Performed by: INTERNAL MEDICINE

## 2018-11-06 RX ORDER — BENZONATATE 200 MG/1
200 CAPSULE ORAL 3 TIMES DAILY PRN
Qty: 30 CAPSULE | Refills: 0 | Status: SHIPPED | OUTPATIENT
Start: 2018-11-06 | End: 2019-02-11

## 2018-11-06 RX ORDER — AZITHROMYCIN 250 MG/1
TABLET, FILM COATED ORAL
Qty: 6 TABLET | Refills: 0 | Status: SHIPPED | OUTPATIENT
Start: 2018-11-06 | End: 2019-02-11

## 2018-11-06 RX ORDER — TRIAMCINOLONE ACETONIDE 40 MG/ML
20 INJECTION, SUSPENSION INTRA-ARTICULAR; INTRAMUSCULAR ONCE
Status: COMPLETED | OUTPATIENT
Start: 2018-11-06 | End: 2018-11-06

## 2018-11-06 RX ORDER — METHYLPREDNISOLONE ACETATE 80 MG/ML
80 INJECTION, SUSPENSION INTRA-ARTICULAR; INTRALESIONAL; INTRAMUSCULAR; SOFT TISSUE ONCE
Status: COMPLETED | OUTPATIENT
Start: 2018-11-06 | End: 2018-11-06

## 2018-11-06 RX ORDER — PHENYLEPHRINE HCL 10 MG/1
TABLET, FILM COATED ORAL
Qty: 36 TABLET | Refills: 0 | Status: SHIPPED | OUTPATIENT
Start: 2018-11-06 | End: 2019-03-05

## 2018-11-06 RX ADMIN — METHYLPREDNISOLONE ACETATE 80 MG: 80 INJECTION, SUSPENSION INTRA-ARTICULAR; INTRALESIONAL; INTRAMUSCULAR; SOFT TISSUE at 16:47

## 2018-11-06 RX ADMIN — TRIAMCINOLONE ACETONIDE 20 MG: 40 INJECTION, SUSPENSION INTRA-ARTICULAR; INTRAMUSCULAR at 16:47

## 2018-11-06 NOTE — PROGRESS NOTES
"Subjective        History of Present Illness     Anel Mullen is a 53 y.o. female with history of mild intermittent asthma who had onset of a flare of seasonal allergy symptoms and head congestion a few days ago, which acutely worsened this morning.  She continues taking Zyrtec each morning.  Despite this, she started having sore throat and worsening headache.  Denies nasal discharge.  Symptoms have progressed to chest congestion and mostly nonproductive cough, which has increased in frequency.  She has not noticed wheezing, although she has wheezing on exam.  She has been having chills.  Denies flu-like body aches.   She took Ibuprofen this morning.  She has albuterol and Symbicort at home, but has not been using her Symbicort.   She completed a 10-day course of Ceftin prescribed at Urgent Care last month, which did not resolve symptoms.  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    We have been following elevated LFTs, which had normalized.  We felt it was related to her compounded hormone therapy, which she has discontinued.          Review of Systems   Constitutional: Positive for chills. Negative for fatigue and fever.   HENT: Positive for congestion, postnasal drip, sinus pressure and sore throat. Negative for ear pain.    Respiratory: Positive for cough and wheezing. Negative for shortness of breath.    Cardiovascular: Negative for chest pain, palpitations and leg swelling.   Gastrointestinal: Negative for abdominal pain, blood in stool, constipation, diarrhea, nausea and vomiting.   Endocrine: Negative for cold intolerance, heat intolerance, polydipsia and polyuria.   Genitourinary: Negative for dysuria, frequency, hematuria and urgency.   Skin: Negative for rash.   Neurological: Negative for syncope and weakness.      Objective     Vitals:    11/06/18 1551   BP: 132/70   Pulse: 72   Temp: 97.8 °F (36.6 °C)   TempSrc: Oral   Weight: 58.1 kg (128 lb)   Height: 157.5 cm (62\")     Physical Exam "   Constitutional: She is oriented to person, place, and time. She appears well-developed and well-nourished. No distress.   HENT:   Head: Normocephalic and atraumatic.   Nose: Nose normal.   Mouth/Throat: Oropharynx is clear and moist. No oropharyngeal exudate.   Posterior erythema.  No exudate  Bilateral ears have small clear effusions.   Bilateral maxillary sinus tenderness and frontal sinus tenderness.  Nasal congestion.       Eyes: Pupils are equal, round, and reactive to light. EOM are normal.   Neck: Neck supple. No JVD present. No thyromegaly present.   Cardiovascular: Normal rate, regular rhythm and normal heart sounds.    Pulmonary/Chest: Effort normal. No accessory muscle usage. No respiratory distress. She has wheezes. She has no rales.   Increased bronchial sounds and expiratory wheezes.       Abdominal: Soft. Bowel sounds are normal. She exhibits no distension. There is no tenderness.   Musculoskeletal: She exhibits no edema.   Lymphadenopathy:     She has no cervical adenopathy.   Neurological: She is alert and oriented to person, place, and time. No cranial nerve deficit.   Psychiatric: She has a normal mood and affect. Her speech is normal and behavior is normal. Judgment and thought content normal.     Future Appointments  Date Time Provider Department Center   2/11/2019 3:30 PM Rohan Bryan MD [facility] None         Assessment/Plan      A prescription is sent for Z-pack to use as directed, phenylephrine (Sudafed PE) 10 mg 1 po 2-3 times daily prn congestion, and Tessalon Perles to use one t.i.d. or cough.  Recommended she resume Symbicort twice daily and albuterol inhaler two puffs every 6h for wheezing and OTC Delsym.  She is given Kenalog 20 mg and Depo-Medrol 80 mg IM without difficulty or complications.  Patient tolerated well without complications.   Recommended plenty of liquids and rest for symptomatic relief.  She is given a work excuse for tomorrow.        Continue Zyrtec 10 mg daily  and use Flonase nasal spray twice daily.    Return in February for routine follow up with fasting labs one week prior or sooner if needed.      Scribed for Dr. Bryan by Mishel Patrick [facility].     Diagnoses and all orders for this visit:    Acute bronchitis, unspecified organism    Mild intermittent asthma with acute exacerbation  -     methylPREDNISolone acetate (DEPO-medrol) injection 80 mg; Inject 1 mL into the appropriate muscle as directed by prescriber 1 (One) Time.  -     triamcinolone acetonide (KENALOG-40) injection 20 mg; Inject 0.5 mL into the appropriate muscle as directed by prescriber 1 (One) Time.    Acute maxillary sinusitis, recurrence not specified    Elevated LFTs    Other orders  -     azithromycin (ZITHROMAX) 250 MG tablet; Take 2 tablets the first day, then 1 tablet daily for 4 days.  -     phenylephrine (SUDAFED PE) 10 MG tablet; 1 po 2-3 times daily prn congestion  -     benzonatate (TESSALON) 200 MG capsule; Take 1 capsule by mouth 3 (Three) Times a Day As Needed for Cough for up to 30 doses.        No visits with results within 3 Week(s) from this visit.   Latest known visit with results is:   Lab on 09/25/2018   Component Date Value Ref Range Status   • Glucose 09/25/2018 100* 74 - 99 mg/dL Final   • BUN 09/25/2018 21* 7 - 17 mg/dL Final   • Creatinine 09/25/2018 0.84  0.52 - 1.04 mg/dL Final   • Sodium 09/25/2018 141  137 - 145 mmol/L Final   • Potassium 09/25/2018 4.3  3.4 - 5.0 mmol/L Final   • Chloride 09/25/2018 104  98 - 107 mmol/L Final   • CO2 09/25/2018 28.0  22.0 - 30.0 mmol/L Final   • Calcium 09/25/2018 9.8  8.4 - 10.2 mg/dL Final   • Total Protein 09/25/2018 8.3* 6.3 - 8.2 g/dL Final   • Albumin 09/25/2018 4.70  3.50 - 5.00 g/dL Final   • ALT (SGPT) 09/25/2018 69* <=35 U/L Final   • AST (SGOT) 09/25/2018 45* 14 - 36 U/L Final   • Alkaline Phosphatase 09/25/2018 53  38 - 126 U/L Final   • Total Bilirubin 09/25/2018 0.8  0.2 - 1.3 mg/dL Final   • eGFR Non African Amer  09/25/2018 71  51 - 120 mL/min/1.73 Final   • Globulin 09/25/2018 3.6* 2.3 - 3.5 gm/dL Final   • A/G Ratio 09/25/2018 1.3  1.1 - 1.8 g/dL Final   • BUN/Creatinine Ratio 09/25/2018 25.0  7.0 - 25.0 Final   • Anion Gap 09/25/2018 9.0  5.0 - 15.0 mmol/L Final   ]

## 2018-11-12 ENCOUNTER — OFFICE VISIT (OUTPATIENT)
Dept: FAMILY MEDICINE CLINIC | Facility: CLINIC | Age: 53
End: 2018-11-12

## 2018-11-12 VITALS
SYSTOLIC BLOOD PRESSURE: 138 MMHG | OXYGEN SATURATION: 98 % | HEART RATE: 86 BPM | TEMPERATURE: 97.9 F | WEIGHT: 134.4 LBS | DIASTOLIC BLOOD PRESSURE: 86 MMHG | HEIGHT: 62 IN | BODY MASS INDEX: 24.73 KG/M2

## 2018-11-12 DIAGNOSIS — H65.01 RIGHT ACUTE SEROUS OTITIS MEDIA, RECURRENCE NOT SPECIFIED: ICD-10-CM

## 2018-11-12 DIAGNOSIS — R79.89 ELEVATED LFTS: ICD-10-CM

## 2018-11-12 DIAGNOSIS — J45.21 MILD INTERMITTENT ASTHMA WITH ACUTE EXACERBATION: Primary | ICD-10-CM

## 2018-11-12 DIAGNOSIS — J20.9 ACUTE BRONCHITIS, UNSPECIFIED ORGANISM: ICD-10-CM

## 2018-11-12 PROCEDURE — 99214 OFFICE O/P EST MOD 30 MIN: CPT | Performed by: INTERNAL MEDICINE

## 2018-11-12 RX ORDER — BUDESONIDE AND FORMOTEROL FUMARATE DIHYDRATE 160; 4.5 UG/1; UG/1
2 AEROSOL RESPIRATORY (INHALATION)
Qty: 1 INHALER | Refills: 5 | Status: SHIPPED | OUTPATIENT
Start: 2018-11-12 | End: 2020-03-23 | Stop reason: SDUPTHER

## 2018-11-12 RX ORDER — CEFUROXIME AXETIL 500 MG/1
500 TABLET ORAL
Qty: 20 TABLET | Refills: 0 | Status: SHIPPED | OUTPATIENT
Start: 2018-11-12 | End: 2018-11-22

## 2018-11-12 RX ORDER — PREDNISONE 10 MG/1
TABLET ORAL
Qty: 24 TABLET | Refills: 0 | Status: SHIPPED | OUTPATIENT
Start: 2018-11-12 | End: 2019-02-11

## 2018-11-12 RX ORDER — ALBUTEROL SULFATE 90 UG/1
2 AEROSOL, METERED RESPIRATORY (INHALATION) EVERY 6 HOURS PRN
Qty: 1 INHALER | Refills: 5 | Status: SHIPPED | OUTPATIENT
Start: 2018-11-12 | End: 2020-03-23 | Stop reason: SDUPTHER

## 2018-11-12 NOTE — PROGRESS NOTES
"Subjective        History of Present Illness     Anel Mullen is a 53 y.o. female with persistent productive cough, sore throat, ear pressure/fullness and nasal congestion.  She reports some mild nausea, but no diarrhea or vomiting.  She has had chills, but no noted fever.   When she was here six days ago, we treated her for acute bronchitis and asthma exacerbation with Z-pack, steroid injection and albuterol and Symbicort.   She is using the Symbicort twice daily and Albuterol rescue three times daily as well as phenylephrine and Flonase.  She has been taking Tessalon Perles for cough.  She had some phenergan cough syrup at home, which she used last night due to awakening with the cough.        We had her stop herbal hormone replacement due to elevevated LFTs, which have normalized.  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    We will repeat a formal CMP soon.    Review of Systems   Constitutional: Positive for chills. Negative for fatigue and fever.   HENT: Positive for congestion and postnasal drip. Negative for ear pain, sinus pressure and sore throat.    Respiratory: Positive for cough. Negative for shortness of breath and wheezing.    Cardiovascular: Negative for chest pain, palpitations and leg swelling.   Gastrointestinal: Positive for nausea. Negative for abdominal pain, blood in stool, constipation, diarrhea and vomiting.   Endocrine: Negative for cold intolerance, heat intolerance, polydipsia and polyuria.   Genitourinary: Negative for dysuria, frequency, hematuria and urgency.   Skin: Negative for rash.   Neurological: Negative for syncope and weakness.        Objective     Vitals:    11/12/18 1040   BP: 138/86   Pulse: 86   Temp: 97.9 °F (36.6 °C)   TempSrc: Oral   SpO2: 98%   Weight: 61 kg (134 lb 6.4 oz)   Height: 157.5 cm (62\")     Physical Exam   Constitutional: She is oriented to person, place, and time. She appears well-developed and well-nourished. No distress. "   HENT:   Head: Normocephalic and atraumatic.   Nose: Nose normal.   Mouth/Throat: Oropharynx is clear and moist. No oropharyngeal exudate.   Injection of right TM and clear postnasal drip.      Eyes: EOM are normal. Pupils are equal, round, and reactive to light.   Neck: Neck supple. No JVD present. No thyromegaly present.   Cardiovascular: Normal rate, regular rhythm and normal heart sounds.   Pulmonary/Chest: Effort normal. No accessory muscle usage. No respiratory distress. She has wheezes. She has no rales.   Bilateral bronchial sounds and wheezes.   Abdominal: Soft. Bowel sounds are normal. She exhibits no distension. There is no tenderness.   Musculoskeletal: She exhibits no edema.   Lymphadenopathy:     She has no cervical adenopathy.   Neurological: She is alert and oriented to person, place, and time. No cranial nerve deficit.   Psychiatric: She has a normal mood and affect. Her speech is normal and behavior is normal. Judgment and thought content normal.       Assessment/Plan      Prescription is sent for Ceftin 500 mg to take one tablet b.i.d. X 10 days and prednisone 10 mg taper to take as directed.  Continue with Symbicort twice daily and albuterol up to four times daily.  Refills are sent.  Continue with the Tessalon Perles or phenergan cough syrup she has at home for cough.    Continue flonase and PE as needed.    Scribed for Dr. Bryan by Mishel Patrick [facility].     Diagnoses and all orders for this visit:    Mild intermittent asthma with acute exacerbation    Acute bronchitis, unspecified organism    Right acute serous otitis media, recurrence not specified    Elevated LFTs    Other orders  -     cefuroxime (CEFTIN) 500 MG tablet; Take 1 tablet by mouth 2 (Two) Times a Day for 10 days.  -     predniSONE (DELTASONE) 10 MG tablet; 1 by mouth 3 times a day times 4 days, then 1 by mouth twice a day times 4 days, then 1 by mouth daily times 4 days  -     albuterol (PROVENTIL HFA;VENTOLIN HFA) 108 (90  Base) MCG/ACT inhaler; Inhale 2 puffs Every 6 (Six) Hours As Needed for Wheezing.  -     budesonide-formoterol (SYMBICORT) 160-4.5 MCG/ACT inhaler; Inhale 2 puffs 2 (Two) Times a Day.        No visits with results within 3 Week(s) from this visit.   Latest known visit with results is:   Lab on 09/25/2018   Component Date Value Ref Range Status   • Glucose 09/25/2018 100* 74 - 99 mg/dL Final   • BUN 09/25/2018 21* 7 - 17 mg/dL Final   • Creatinine 09/25/2018 0.84  0.52 - 1.04 mg/dL Final   • Sodium 09/25/2018 141  137 - 145 mmol/L Final   • Potassium 09/25/2018 4.3  3.4 - 5.0 mmol/L Final   • Chloride 09/25/2018 104  98 - 107 mmol/L Final   • CO2 09/25/2018 28.0  22.0 - 30.0 mmol/L Final   • Calcium 09/25/2018 9.8  8.4 - 10.2 mg/dL Final   • Total Protein 09/25/2018 8.3* 6.3 - 8.2 g/dL Final   • Albumin 09/25/2018 4.70  3.50 - 5.00 g/dL Final   • ALT (SGPT) 09/25/2018 69* <=35 U/L Final   • AST (SGOT) 09/25/2018 45* 14 - 36 U/L Final   • Alkaline Phosphatase 09/25/2018 53  38 - 126 U/L Final   • Total Bilirubin 09/25/2018 0.8  0.2 - 1.3 mg/dL Final   • eGFR Non  Amer 09/25/2018 71  51 - 120 mL/min/1.73 Final   • Globulin 09/25/2018 3.6* 2.3 - 3.5 gm/dL Final   • A/G Ratio 09/25/2018 1.3  1.1 - 1.8 g/dL Final   • BUN/Creatinine Ratio 09/25/2018 25.0  7.0 - 25.0 Final   • Anion Gap 09/25/2018 9.0  5.0 - 15.0 mmol/L Final   ]

## 2018-12-14 ENCOUNTER — OFFICE VISIT (OUTPATIENT)
Dept: FAMILY MEDICINE CLINIC | Facility: CLINIC | Age: 53
End: 2018-12-14

## 2018-12-14 VITALS
BODY MASS INDEX: 23.55 KG/M2 | HEART RATE: 77 BPM | WEIGHT: 128 LBS | RESPIRATION RATE: 14 BRPM | DIASTOLIC BLOOD PRESSURE: 70 MMHG | HEIGHT: 62 IN | SYSTOLIC BLOOD PRESSURE: 110 MMHG | TEMPERATURE: 97.5 F | OXYGEN SATURATION: 98 %

## 2018-12-14 DIAGNOSIS — G25.81 RESTLESS LEGS SYNDROME (RLS): Primary | Chronic | ICD-10-CM

## 2018-12-14 PROCEDURE — 99214 OFFICE O/P EST MOD 30 MIN: CPT | Performed by: INTERNAL MEDICINE

## 2018-12-14 RX ORDER — ROPINIROLE 0.5 MG/1
TABLET, FILM COATED ORAL
Qty: 75 TABLET | Refills: 11 | Status: SHIPPED | OUTPATIENT
Start: 2018-12-14 | End: 2019-02-11

## 2018-12-14 RX ORDER — ROPINIROLE 0.5 MG/1
0.5 TABLET, FILM COATED ORAL NIGHTLY
Qty: 75 TABLET | Refills: 11 | Status: SHIPPED | OUTPATIENT
Start: 2018-12-14 | End: 2018-12-14 | Stop reason: SDUPTHER

## 2018-12-14 NOTE — PROGRESS NOTES
Subjective     History of Present Illness     Anel Mullen is a 53 y.o. female who reports persistent restless leg symptoms.  When she was here three months ago, she reported symptoms were worse when she got home from work in the evening.  I recommended she take Requip 1/2 tablet when she got home in the evening and the remaining half prior to bedtime to see if this would give more consistent control of her restless leg symptoms, especially her early evening symptoms.  She is taking the 1/2 when she gets home, but is taking a whole tablet at night an hour before bedtime and continues to have symptoms especially near bedtime.  She is not sure if the symptoms are improved with ambulation.  I asked her to try seeing if symptoms are relieved with ambulation to make sure there is nothing else going on.  Symptoms do appear to be worse when she is fatigued.        She continues taking Zoloft 50 mg each morning for anxiety.  She was intolerant with other SSRIs including Wellbutrin in he past.  We discussed how the Zoloft could be exacerbating her restless leg symptoms.  She may be able to decrease to 1/2 tablet in the future if symptoms are adequately managed.     We will be repeating her ferritin with upcoming labs, to reevaluate the past issue with iron deficiency.  Iron deficiency can certainly aggravate restless leg symptoms also.    Review of Systems   Constitutional: Negative for chills, fatigue and fever.   HENT: Negative for congestion, ear pain, postnasal drip, sinus pressure and sore throat.    Respiratory: Negative for cough, shortness of breath and wheezing.    Cardiovascular: Negative for chest pain, palpitations and leg swelling.   Gastrointestinal: Negative for abdominal pain, blood in stool, constipation, diarrhea, nausea and vomiting.   Endocrine: Negative for cold intolerance, heat intolerance, polydipsia and polyuria.   Genitourinary: Negative for dysuria, frequency, hematuria and urgency.   Skin:  "Negative for rash.   Neurological: Negative for syncope and weakness.        Restless leg symptoms        Objective     Vitals:    12/14/18 1338   BP: 110/70   Pulse: 77   Resp: 14   Temp: 97.5 °F (36.4 °C)   TempSrc: Temporal   SpO2: 98%   Weight: 58.1 kg (128 lb)   Height: 157.5 cm (62\")     Physical Exam   Constitutional: She is oriented to person, place, and time. She appears well-developed and well-nourished. No distress.   HENT:   Head: Normocephalic and atraumatic.   Nose: Nose normal.   Mouth/Throat: Oropharynx is clear and moist. No oropharyngeal exudate.   Eyes: EOM are normal. Pupils are equal, round, and reactive to light.   Neck: Neck supple. No JVD present. No thyromegaly present.   Cardiovascular: Normal rate, regular rhythm and normal heart sounds.   Pulmonary/Chest: Effort normal and breath sounds normal. No accessory muscle usage. No respiratory distress. She has no wheezes. She has no rales.   Abdominal: She exhibits no distension.   Musculoskeletal: She exhibits no edema.   Lymphadenopathy:     She has no cervical adenopathy.   Neurological: She is alert and oriented to person, place, and time. No cranial nerve deficit.   Psychiatric: She has a normal mood and affect. Her speech is normal and behavior is normal. Judgment and thought content normal.     Future Appointments   Date Time Provider Department Center   2/11/2019  3:30 PM Rohan Bryan MD [facility] None         Assessment/Plan      For the restless leg symptoms, I recommended she increase her dose of Requip 0.5 mg to take 1/2 tablet by mouth after work in the evening and 2 tablets 1-2 hour prior to bedtime.  I also asked her to see if symptoms are improved with weightbearing, which is a classic symptom and will confirm the diagnosis.      After the holidays are completed, I suggested she might try reducing the Zoloft by 50% temporarily to see if that makes much difference in her restless leg symptoms.    Continue iron replacement and " keep follow-up appointment in February to reevaluate her labs including CBC and ferritin.  Iron deficiency can certainly aggravate restless leg symptoms.      Return in February for follow up with fasting labs one week prior.     Scribed for Dr. Bryan by Mishel Patrick [facility].     Diagnoses and all orders for this visit:    Restless legs syndrome (RLS)    Other orders  -     Discontinue: rOPINIRole (REQUIP) 0.5 MG tablet; Take 1 tablet by mouth Every Night. 1/2 after work and 2 tablets in the evening as directed.  -     rOPINIRole (REQUIP) 0.5 MG tablet; 1/2 after work and 2 tablets in the evening as directed.        No visits with results within 3 Week(s) from this visit.   Latest known visit with results is:   Lab on 09/25/2018   Component Date Value Ref Range Status   • Glucose 09/25/2018 100* 74 - 99 mg/dL Final   • BUN 09/25/2018 21* 7 - 17 mg/dL Final   • Creatinine 09/25/2018 0.84  0.52 - 1.04 mg/dL Final   • Sodium 09/25/2018 141  137 - 145 mmol/L Final   • Potassium 09/25/2018 4.3  3.4 - 5.0 mmol/L Final   • Chloride 09/25/2018 104  98 - 107 mmol/L Final   • CO2 09/25/2018 28.0  22.0 - 30.0 mmol/L Final   • Calcium 09/25/2018 9.8  8.4 - 10.2 mg/dL Final   • Total Protein 09/25/2018 8.3* 6.3 - 8.2 g/dL Final   • Albumin 09/25/2018 4.70  3.50 - 5.00 g/dL Final   • ALT (SGPT) 09/25/2018 69* <=35 U/L Final   • AST (SGOT) 09/25/2018 45* 14 - 36 U/L Final   • Alkaline Phosphatase 09/25/2018 53  38 - 126 U/L Final   • Total Bilirubin 09/25/2018 0.8  0.2 - 1.3 mg/dL Final   • eGFR Non  Amer 09/25/2018 71  51 - 120 mL/min/1.73 Final   • Globulin 09/25/2018 3.6* 2.3 - 3.5 gm/dL Final   • A/G Ratio 09/25/2018 1.3  1.1 - 1.8 g/dL Final   • BUN/Creatinine Ratio 09/25/2018 25.0  7.0 - 25.0 Final   • Anion Gap 09/25/2018 9.0  5.0 - 15.0 mmol/L Final   ]

## 2019-01-28 DIAGNOSIS — M79.644 PAIN OF FINGER OF RIGHT HAND: Primary | ICD-10-CM

## 2019-01-28 RX ORDER — PANTOPRAZOLE SODIUM 40 MG/1
TABLET, DELAYED RELEASE ORAL
Qty: 90 TABLET | Refills: 3 | Status: SHIPPED | OUTPATIENT
Start: 2019-01-28 | End: 2019-06-24 | Stop reason: SDUPTHER

## 2019-01-29 RX ORDER — FAMCICLOVIR 500 MG/1
500 TABLET ORAL 3 TIMES DAILY
Qty: 15 TABLET | Refills: 5 | Status: SHIPPED | OUTPATIENT
Start: 2019-01-29 | End: 2019-02-11

## 2019-02-05 ENCOUNTER — LAB (OUTPATIENT)
Dept: LAB | Facility: OTHER | Age: 54
End: 2019-02-05

## 2019-02-05 DIAGNOSIS — R94.5 ABNORMAL LIVER FUNCTION: ICD-10-CM

## 2019-02-05 DIAGNOSIS — E78.2 MIXED HYPERLIPIDEMIA: Chronic | ICD-10-CM

## 2019-02-05 DIAGNOSIS — D50.9 IRON DEFICIENCY ANEMIA, UNSPECIFIED IRON DEFICIENCY ANEMIA TYPE: Chronic | ICD-10-CM

## 2019-02-05 LAB
ALBUMIN SERPL-MCNC: 4.6 G/DL (ref 3.5–5)
ALBUMIN/GLOB SERPL: 1.6 G/DL (ref 1.1–1.8)
ALP SERPL-CCNC: 53 U/L (ref 38–126)
ALT SERPL W P-5'-P-CCNC: 42 U/L
ANION GAP SERPL CALCULATED.3IONS-SCNC: 9 MMOL/L (ref 5–15)
AST SERPL-CCNC: 33 U/L (ref 14–36)
BASOPHILS # BLD AUTO: 0.01 10*3/MM3 (ref 0–0.2)
BASOPHILS NFR BLD AUTO: 0.2 % (ref 0–2)
BILIRUB SERPL-MCNC: 0.5 MG/DL (ref 0.2–1.3)
BUN BLD-MCNC: 17 MG/DL (ref 7–17)
BUN/CREAT SERPL: 20.5 (ref 7–25)
CALCIUM SPEC-SCNC: 9.6 MG/DL (ref 8.4–10.2)
CHLORIDE SERPL-SCNC: 105 MMOL/L (ref 98–107)
CHOLEST SERPL-MCNC: 169 MG/DL (ref 150–200)
CO2 SERPL-SCNC: 30 MMOL/L (ref 22–30)
CREAT BLD-MCNC: 0.83 MG/DL (ref 0.52–1.04)
DEPRECATED RDW RBC AUTO: 39.1 FL (ref 36.4–46.3)
EOSINOPHIL # BLD AUTO: 0.13 10*3/MM3 (ref 0–0.7)
EOSINOPHIL NFR BLD AUTO: 2.9 % (ref 0–7)
ERYTHROCYTE [DISTWIDTH] IN BLOOD BY AUTOMATED COUNT: 12.4 % (ref 11.5–14.5)
GFR SERPL CREATININE-BSD FRML MDRD: 72 ML/MIN/1.73 (ref 51–120)
GLOBULIN UR ELPH-MCNC: 2.8 GM/DL (ref 2.3–3.5)
GLUCOSE BLD-MCNC: 100 MG/DL (ref 74–99)
HCT VFR BLD AUTO: 39.9 % (ref 35–45)
HDLC SERPL-MCNC: 60 MG/DL (ref 40–59)
HGB BLD-MCNC: 13.5 G/DL (ref 12–15.5)
LDLC SERPL CALC-MCNC: 94 MG/DL
LDLC/HDLC SERPL: 1.57 {RATIO} (ref 0–3.22)
LYMPHOCYTES # BLD AUTO: 1.27 10*3/MM3 (ref 0.6–4.2)
LYMPHOCYTES NFR BLD AUTO: 28.2 % (ref 10–50)
MCH RBC QN AUTO: 29.9 PG (ref 26.5–34)
MCHC RBC AUTO-ENTMCNC: 33.8 G/DL (ref 31.4–36)
MCV RBC AUTO: 88.5 FL (ref 80–98)
MONOCYTES # BLD AUTO: 0.35 10*3/MM3 (ref 0–0.9)
MONOCYTES NFR BLD AUTO: 7.8 % (ref 0–12)
NEUTROPHILS # BLD AUTO: 2.74 10*3/MM3 (ref 2–8.6)
NEUTROPHILS NFR BLD AUTO: 60.9 % (ref 37–80)
PLATELET # BLD AUTO: 177 10*3/MM3 (ref 150–450)
PMV BLD AUTO: 9.9 FL (ref 8–12)
POTASSIUM BLD-SCNC: 4 MMOL/L (ref 3.4–5)
PROT SERPL-MCNC: 7.4 G/DL (ref 6.3–8.2)
RBC # BLD AUTO: 4.51 10*6/MM3 (ref 3.77–5.16)
SODIUM BLD-SCNC: 144 MMOL/L (ref 137–145)
TRIGL SERPL-MCNC: 74 MG/DL
VLDLC SERPL-MCNC: 14.8 MG/DL
WBC NRBC COR # BLD: 4.5 10*3/MM3 (ref 3.2–9.8)

## 2019-02-05 PROCEDURE — 85025 COMPLETE CBC W/AUTO DIFF WBC: CPT | Performed by: INTERNAL MEDICINE

## 2019-02-05 PROCEDURE — 82728 ASSAY OF FERRITIN: CPT | Performed by: INTERNAL MEDICINE

## 2019-02-05 PROCEDURE — 80061 LIPID PANEL: CPT | Performed by: INTERNAL MEDICINE

## 2019-02-05 PROCEDURE — 80053 COMPREHEN METABOLIC PANEL: CPT | Performed by: INTERNAL MEDICINE

## 2019-02-05 PROCEDURE — 83550 IRON BINDING TEST: CPT | Performed by: INTERNAL MEDICINE

## 2019-02-05 PROCEDURE — 83540 ASSAY OF IRON: CPT | Performed by: INTERNAL MEDICINE

## 2019-02-05 PROCEDURE — 82607 VITAMIN B-12: CPT | Performed by: INTERNAL MEDICINE

## 2019-02-05 PROCEDURE — 36415 COLL VENOUS BLD VENIPUNCTURE: CPT | Performed by: INTERNAL MEDICINE

## 2019-02-06 LAB
FERRITIN SERPL-MCNC: 59.7 NG/ML (ref 11.1–264)
IRON 24H UR-MRATE: 45 MCG/DL (ref 37–170)
IRON SATN MFR SERPL: 14 % (ref 15–50)
TIBC SERPL-MCNC: 321 MCG/DL (ref 265–497)
VIT B12 BLD-MCNC: 780 PG/ML (ref 239–931)

## 2019-02-11 ENCOUNTER — OFFICE VISIT (OUTPATIENT)
Dept: FAMILY MEDICINE CLINIC | Facility: CLINIC | Age: 54
End: 2019-02-11

## 2019-02-11 VITALS
HEART RATE: 72 BPM | HEIGHT: 62 IN | SYSTOLIC BLOOD PRESSURE: 130 MMHG | DIASTOLIC BLOOD PRESSURE: 80 MMHG | BODY MASS INDEX: 25.43 KG/M2 | WEIGHT: 138.2 LBS | TEMPERATURE: 97.7 F

## 2019-02-11 DIAGNOSIS — G25.81 RESTLESS LEGS SYNDROME (RLS): Chronic | ICD-10-CM

## 2019-02-11 DIAGNOSIS — D50.9 IRON DEFICIENCY ANEMIA, UNSPECIFIED IRON DEFICIENCY ANEMIA TYPE: Primary | Chronic | ICD-10-CM

## 2019-02-11 DIAGNOSIS — Z91.013 SEAFOOD ALLERGY: Chronic | ICD-10-CM

## 2019-02-11 DIAGNOSIS — Z00.00 ROUTINE GENERAL MEDICAL EXAMINATION AT A HEALTH CARE FACILITY: ICD-10-CM

## 2019-02-11 PROCEDURE — 99214 OFFICE O/P EST MOD 30 MIN: CPT | Performed by: INTERNAL MEDICINE

## 2019-02-11 RX ORDER — ROPINIROLE 0.5 MG/1
TABLET, FILM COATED ORAL
Qty: 90 TABLET | Refills: 11 | Status: SHIPPED | OUTPATIENT
Start: 2019-02-11 | End: 2019-06-24 | Stop reason: SDUPTHER

## 2019-02-11 NOTE — PROGRESS NOTES
Subjective        History of Present Illness     Kamille is a 54-year-old female who presents for annual exam.  Medical issues included hyperlipidemia, GERD, and iron deficiency anemia, for which she continues on oral iron twice weekly.  Restless leg symptoms are better controlled with taking one tablet when she gets home and two q.h.s.  We discussed how Zoloft has been noted to exacerbate restless leg symptoms.  Therefore,  I recommended she try decreasing the Zoloft to 1/2 tablet to see if this improves restless leg symptoms.  She tried weaning off completely unsuccessfully in the past.          Liver enzymes are improved.  She reports she has just recently stopped Famvir for fever blisters and thought maybe this could have been a cause of the elevated liver enzymes.      She jammed her right third finger while doing a cartwheel on a youth trip.  X-ray was normal.  She is given reassurance pain should gradually resolve.          The patient's relevant past medical, surgical, and social history was reviewed in Epic.   Lab results are reviewed with the patient today.  CBC unremarkable.  Fasting glucose 100.  Renal function normal.   B-12 at goal.  Total cholesterol 169.  HDL 60.  LDL 94.  Triglycerides 74.  LDL/HDL ratio 1.57.  Vitamin B-12 at goal at 780.        Review of Systems   Constitutional: Negative for chills, fatigue and fever.   HENT: Negative for congestion, ear pain, postnasal drip, sinus pressure and sore throat.    Respiratory: Negative for cough, shortness of breath and wheezing.    Cardiovascular: Negative for chest pain, palpitations and leg swelling.   Gastrointestinal: Negative for abdominal pain, blood in stool, constipation, diarrhea, nausea and vomiting.   Endocrine: Negative for cold intolerance, heat intolerance, polydipsia and polyuria.   Genitourinary: Negative for dysuria, frequency, hematuria and urgency.   Skin: Negative for rash.   Neurological: Negative for syncope and weakness.     "    Objective     Vitals:    02/11/19 1549   BP: 130/80   Pulse: 72   Temp: 97.7 °F (36.5 °C)   TempSrc: Oral   Weight: 62.7 kg (138 lb 3.2 oz)   Height: 157.5 cm (62\")       Physical Exam   Constitutional: She is oriented to person, place, and time. She appears well-developed and well-nourished. No distress.   HENT:   Head: Normocephalic and atraumatic.   Nose: Right sinus exhibits no maxillary sinus tenderness and no frontal sinus tenderness. Left sinus exhibits no maxillary sinus tenderness and no frontal sinus tenderness.   Mouth/Throat: Uvula is midline, oropharynx is clear and moist and mucous membranes are normal. No oral lesions. No tonsillar exudate.   Eyes: Conjunctivae and EOM are normal. Pupils are equal, round, and reactive to light.   Neck: Trachea normal. Neck supple. No JVD present. Carotid bruit is not present. No tracheal deviation present. No thyroid mass and no thyromegaly present.   Cardiovascular: Normal rate, regular rhythm, normal heart sounds and intact distal pulses.  No extrasystoles are present. PMI is not displaced.   No murmur heard.  Pulmonary/Chest: Effort normal and breath sounds normal. No accessory muscle usage. No respiratory distress. She has no decreased breath sounds. She has no wheezes. She has no rhonchi. She has no rales.   Abdominal: Soft. Bowel sounds are normal. She exhibits no distension. There is no hepatosplenomegaly. There is no tenderness.     Vascular Status -  Her right foot exhibits normal foot vasculature  and no edema. Her left foot exhibits normal foot vasculature  and no edema.  Lymphadenopathy:     She has no cervical adenopathy.   Neurological: She is alert and oriented to person, place, and time. No cranial nerve deficit. Coordination normal.   Skin: Skin is warm, dry and intact. No rash noted. No cyanosis. Nails show no clubbing.   Psychiatric: She has a normal mood and affect. Her speech is normal and behavior is normal. Judgment and thought content " normal.   Vitals reviewed.          Assessment/Plan      Continue oral iron twice weekly.    Continue on Protonix daily for management of GERD symptoms.     Continue with the Requip one when she gets home from work in the evening and two q.h.s., which seems to be working well.   I recommended she try taking 1/2 tablet of Zoloft to see if this improves restless leg symptoms as well.      I recommended she strive to get regular weight bearing exercise.      Continue other medications and vitamin and mineral supplements to treat additional medical problems which we addressed today.  Return in one year for annual exam with fasting labs one week prior.      Scribed for Dr. Bryan by Mishel Patrick [facility].     Diagnoses and all orders for this visit:    Iron deficiency anemia, unspecified iron deficiency anemia type  -     CBC Auto Differential; Future  -     Comprehensive Metabolic Panel; Future  -     Ferritin; Future  -     Lipid Panel; Future  -     TSH; Future  -     Vitamin B12; Future    Restless legs syndrome (RLS)    Seafood allergy    Routine general medical examination at a health care facility  -     Lipid Panel; Future  -     TSH; Future    Other orders  -     rOPINIRole (REQUIP) 0.5 MG tablet; 1 after work and 2 tablets in the evening as directed.        Lab on 02/05/2019   Component Date Value Ref Range Status   • Vitamin B-12 02/05/2019 780  239 - 931 pg/mL Final   • WBC 02/05/2019 4.50  3.20 - 9.80 10*3/mm3 Final   • RBC 02/05/2019 4.51  3.77 - 5.16 10*6/mm3 Final   • Hemoglobin 02/05/2019 13.5  12.0 - 15.5 g/dL Final   • Hematocrit 02/05/2019 39.9  35.0 - 45.0 % Final   • MCV 02/05/2019 88.5  80.0 - 98.0 fL Final   • MCH 02/05/2019 29.9  26.5 - 34.0 pg Final   • MCHC 02/05/2019 33.8  31.4 - 36.0 g/dL Final   • RDW 02/05/2019 12.4  11.5 - 14.5 % Final   • RDW-SD 02/05/2019 39.1  36.4 - 46.3 fl Final   • MPV 02/05/2019 9.9  8.0 - 12.0 fL Final   • Platelets 02/05/2019 177  150 - 450 10*3/mm3 Final   •  Neutrophil % 02/05/2019 60.9  37.0 - 80.0 % Final   • Lymphocyte % 02/05/2019 28.2  10.0 - 50.0 % Final   • Monocyte % 02/05/2019 7.8  0.0 - 12.0 % Final   • Eosinophil % 02/05/2019 2.9  0.0 - 7.0 % Final   • Basophil % 02/05/2019 0.2  0.0 - 2.0 % Final   • Neutrophils, Absolute 02/05/2019 2.74  2.00 - 8.60 10*3/mm3 Final   • Lymphocytes, Absolute 02/05/2019 1.27  0.60 - 4.20 10*3/mm3 Final   • Monocytes, Absolute 02/05/2019 0.35  0.00 - 0.90 10*3/mm3 Final   • Eosinophils, Absolute 02/05/2019 0.13  0.00 - 0.70 10*3/mm3 Final   • Basophils, Absolute 02/05/2019 0.01  0.00 - 0.20 10*3/mm3 Final   • Glucose 02/05/2019 100* 74 - 99 mg/dL Final   • BUN 02/05/2019 17  7 - 17 mg/dL Final   • Creatinine 02/05/2019 0.83  0.52 - 1.04 mg/dL Final   • Sodium 02/05/2019 144  137 - 145 mmol/L Final   • Potassium 02/05/2019 4.0  3.4 - 5.0 mmol/L Final   • Chloride 02/05/2019 105  98 - 107 mmol/L Final   • CO2 02/05/2019 30.0  22.0 - 30.0 mmol/L Final   • Calcium 02/05/2019 9.6  8.4 - 10.2 mg/dL Final   • Total Protein 02/05/2019 7.4  6.3 - 8.2 g/dL Final   • Albumin 02/05/2019 4.60  3.50 - 5.00 g/dL Final   • ALT (SGPT) 02/05/2019 42* <=35 U/L Final   • AST (SGOT) 02/05/2019 33  14 - 36 U/L Final   • Alkaline Phosphatase 02/05/2019 53  38 - 126 U/L Final   • Total Bilirubin 02/05/2019 0.5  0.2 - 1.3 mg/dL Final   • eGFR Non African Amer 02/05/2019 72  51 - 120 mL/min/1.73 Final   • Globulin 02/05/2019 2.8  2.3 - 3.5 gm/dL Final   • A/G Ratio 02/05/2019 1.6  1.1 - 1.8 g/dL Final   • BUN/Creatinine Ratio 02/05/2019 20.5  7.0 - 25.0 Final   • Anion Gap 02/05/2019 9.0  5.0 - 15.0 mmol/L Final   • Ferritin 02/05/2019 59.70  11.10 - 264.00 ng/mL Final   • Iron 02/05/2019 45  37 - 170 mcg/dL Final   • TIBC 02/05/2019 321  265 - 497 mcg/dL Final   • Iron Saturation 02/05/2019 14* 15 - 50 % Final   • Total Cholesterol 02/05/2019 169  150 - 200 mg/dL Final   • Triglycerides 02/05/2019 74  <=150 mg/dL Final   • HDL Cholesterol 02/05/2019 60*  40 - 59 mg/dL Final   • LDL Cholesterol  02/05/2019 94  <=100 mg/dL Final   • VLDL Cholesterol 02/05/2019 14.8  mg/dL Final   • LDL/HDL Ratio 02/05/2019 1.57  0.00 - 3.22 Final   ]

## 2019-05-20 RX ORDER — IRON PS COMPLEX/B12/FOLIC ACID 150-25-1
CAPSULE ORAL
Qty: 30 CAPSULE | Refills: 5 | Status: SHIPPED | OUTPATIENT
Start: 2019-05-20 | End: 2019-06-24 | Stop reason: SDUPTHER

## 2019-06-12 RX ORDER — NITROFURANTOIN 25; 75 MG/1; MG/1
100 CAPSULE ORAL AS NEEDED
Qty: 30 CAPSULE | Refills: 2 | Status: SHIPPED | OUTPATIENT
Start: 2019-06-12 | End: 2020-02-28

## 2019-06-24 RX ORDER — ROPINIROLE 0.5 MG/1
TABLET, FILM COATED ORAL
Qty: 90 TABLET | Refills: 11 | Status: SHIPPED | OUTPATIENT
Start: 2019-06-24 | End: 2020-03-23 | Stop reason: SDUPTHER

## 2019-06-24 RX ORDER — PANTOPRAZOLE SODIUM 40 MG/1
40 TABLET, DELAYED RELEASE ORAL DAILY
Qty: 90 TABLET | Refills: 3 | Status: SHIPPED | OUTPATIENT
Start: 2019-06-24 | End: 2020-03-23 | Stop reason: SDUPTHER

## 2019-06-24 RX ORDER — ATORVASTATIN CALCIUM 20 MG/1
10-20 TABLET, FILM COATED ORAL DAILY
Qty: 90 TABLET | Refills: 3 | Status: SHIPPED | OUTPATIENT
Start: 2019-06-24 | End: 2020-03-23 | Stop reason: SDUPTHER

## 2020-01-08 DIAGNOSIS — Z12.31 ENCOUNTER FOR SCREENING MAMMOGRAM FOR MALIGNANT NEOPLASM OF BREAST: Primary | ICD-10-CM

## 2020-02-14 ENCOUNTER — OFFICE VISIT (OUTPATIENT)
Dept: OBSTETRICS AND GYNECOLOGY | Facility: CLINIC | Age: 55
End: 2020-02-14

## 2020-02-14 VITALS
HEIGHT: 62 IN | DIASTOLIC BLOOD PRESSURE: 62 MMHG | HEART RATE: 91 BPM | BODY MASS INDEX: 26.02 KG/M2 | SYSTOLIC BLOOD PRESSURE: 102 MMHG | WEIGHT: 141.4 LBS

## 2020-02-14 DIAGNOSIS — Z01.419 ENCOUNTER FOR ROUTINE GYNECOLOGICAL EXAMINATION WITH PAPANICOLAOU SMEAR OF CERVIX: Primary | ICD-10-CM

## 2020-02-14 DIAGNOSIS — F41.9 ANXIETY: ICD-10-CM

## 2020-02-14 DIAGNOSIS — G25.81 RESTLESS LEGS SYNDROME (RLS): Chronic | ICD-10-CM

## 2020-02-14 PROCEDURE — 99396 PREV VISIT EST AGE 40-64: CPT | Performed by: NURSE PRACTITIONER

## 2020-02-14 PROCEDURE — 87624 HPV HI-RISK TYP POOLED RSLT: CPT | Performed by: NURSE PRACTITIONER

## 2020-02-14 PROCEDURE — G0123 SCREEN CERV/VAG THIN LAYER: HCPCS | Performed by: NURSE PRACTITIONER

## 2020-02-14 RX ORDER — BROMPHENIRAMINE MALEATE, PSEUDOEPHEDRINE HYDROCHLORIDE, AND DEXTROMETHORPHAN HYDROBROMIDE 2; 30; 10 MG/5ML; MG/5ML; MG/5ML
SYRUP ORAL
COMMUNITY
Start: 2020-02-13 | End: 2020-02-17

## 2020-02-14 RX ORDER — VENLAFAXINE HYDROCHLORIDE 37.5 MG/1
37.5 CAPSULE, EXTENDED RELEASE ORAL DAILY
Qty: 30 CAPSULE | Refills: 1 | Status: SHIPPED | OUTPATIENT
Start: 2020-02-14 | End: 2020-03-23 | Stop reason: SDUPTHER

## 2020-02-14 RX ORDER — AZITHROMYCIN 250 MG/1
TABLET, FILM COATED ORAL
COMMUNITY
Start: 2020-02-13 | End: 2020-02-17

## 2020-02-14 NOTE — PROGRESS NOTES
Subjective   Chief Complaint   Patient presents with   • Gynecologic Exam     well woman annual visit     Anel Mullen is a 55 y.o. year old  presenting to be seen for her annual exam.  Pt is no longer taking BHRT. She feels good without it. Pt mentions changing her zoloft. She has been on it for a long time and struggles with RLS. She has been told in the past that it could be caused by the zoloft. She didn't do well on Wellbutrin in the past. She has not tried an SNRI, although the risk of RLS is still there. She would like to change to see if this improves.     She is sexually active.  In the past 12 months there has not been new sexual partners.  Condoms are not typically used.  She would not like to be screened for STD's at today's exam.     She exercises regularly: yes.  She wears her seat belt:yes.  She has concerns about domestic violence: no.  She is taking Vit D and Calcium:yes  Last colonoscopy:   Last DEXA: Never  Last MM18, has one scheduled immediately after this appt  Last PAP: 17  Hx of abnormal pap: Yes, had colposcopy but not need for further treatment per pt. It was 20+ years ago       NATURAL MENOPAUSE  LMP:     OB History        3    Para   3    Term   3            AB        Living           SAB        TAB        Ectopic        Molar        Multiple        Live Births                    No Additional Complaints Reported    The following portions of the patient's history were reviewed and updated as appropriate:problem list, current medications, allergies, past family history, past medical history, past social history and past surgical history.    Social History    Tobacco Use      Smoking status: Never Smoker      Smokeless tobacco: Never Used    Review of Systems   Constitutional: Negative.  Negative for unexpected weight change.   Respiratory: Negative.    Cardiovascular: Negative.    Gastrointestinal: Negative.  Negative for constipation and  "diarrhea.   Endocrine: Negative.    Genitourinary: Negative.  Negative for pelvic pain, vaginal bleeding, vaginal discharge and vaginal pain.   Musculoskeletal:        RLS   Skin: Negative.    Neurological: Negative for dizziness, syncope, light-headedness and headaches.   Psychiatric/Behavioral: Negative for suicidal ideas. The patient is not nervous/anxious (well managed ).          Objective   /62   Pulse 91   Ht 157.5 cm (62\")   Wt 64.1 kg (141 lb 6.4 oz)   LMP 01/31/2009 (Approximate)   Breastfeeding No   BMI 25.86 kg/m²     General:  well developed; well nourished  no acute distress   Skin:  No suspicious lesions seen   Cardiac: Heart sounds are normal.  Regular rate and rhythm without murmur, gallop or rub.   Resp:  Normal expansion.  Clear to auscultation.  No rales, rhonchi, or wheezing.   Thyroid: normal to inspection and palpation   Breasts:  Examined in supine position  Symmetric without masses or skin dimpling  Nipples normal without inversion, lesions or discharge  There are no palpable axillary nodes   Abdomen: soft, non-tender; no masses  no umbilical or inginual hernias are present  no hepato-splenomegaly  Normal findings: bowel sounds normal   Psych: alert,oriented, in NAD with a full range of affect, normal behavior and no psychotic features   Pelvis: Clinical staff was present for exam  External genitalia:  normal appearance of the external genitalia including Bartholin's and Watrous's glands.  :  urethral meatus normal;  Vaginal:  normal pink mucosa without prolapse or lesions.  Cervix:  normal appearance.  Uterus:  normal size, shape and consistency.  Adnexa:  normal bimanual exam of the adnexa.     Lab Review   No data reviewed    Imaging  Mammogram report           Anel was seen today for gynecologic exam.    Diagnoses and all orders for this visit:    Encounter for routine gynecological examination with Papanicolaou smear of cervix  -     Liquid-based Pap Smear, " Screening    Anxiety  -     venlafaxine XR (EFFEXOR-XR) 37.5 MG 24 hr capsule; Take 1 capsule by mouth Daily.    Restless legs syndrome (RLS)  -     venlafaxine XR (EFFEXOR-XR) 37.5 MG 24 hr capsule; Take 1 capsule by mouth Daily.    Pap results: I will send card in mail or call if abnormal. RTC annually for well woman exams.     We discussed that RLS could continue with effexor but we can try to wean off zoloft and on to effexor and monitor. Pt agrees with this plan. Cut 50mg zoloft tablets in half x 1 week. Then reduce to every other day, half tablets. Then twice a week and stop if doing ok. Begin effexor immediately. After completely being off zoloft and on effexor for 1 month, pt is to let me know if she is needed an dose increase of the effexor or if RLS is worsening. Pt agrees with this plan of care.       This note was electronically signed.    Domenica Smith, APRN  February 14, 2020

## 2020-02-19 LAB
GEN CATEG CVX/VAG CYTO-IMP: NORMAL
LAB AP CASE REPORT: NORMAL
LAB AP GYN ADDITIONAL INFORMATION: NORMAL
LAB AP GYN OTHER FINDINGS: NORMAL
PATH INTERP SPEC-IMP: NORMAL
STAT OF ADQ CVX/VAG CYTO-IMP: NORMAL

## 2020-02-20 LAB — HPV I/H RISK 4 DNA CVX QL PROBE+SIG AMP: NEGATIVE

## 2020-03-23 DIAGNOSIS — R79.89 ELEVATED LFTS: ICD-10-CM

## 2020-03-23 DIAGNOSIS — F41.9 ANXIETY: ICD-10-CM

## 2020-03-23 DIAGNOSIS — E78.2 MIXED HYPERLIPIDEMIA: Primary | Chronic | ICD-10-CM

## 2020-03-23 DIAGNOSIS — G25.81 RESTLESS LEGS SYNDROME (RLS): Chronic | ICD-10-CM

## 2020-03-23 DIAGNOSIS — D50.9 IRON DEFICIENCY ANEMIA, UNSPECIFIED IRON DEFICIENCY ANEMIA TYPE: ICD-10-CM

## 2020-03-23 RX ORDER — ATORVASTATIN CALCIUM 20 MG/1
10-20 TABLET, FILM COATED ORAL DAILY
Qty: 90 TABLET | Refills: 1 | Status: SHIPPED | OUTPATIENT
Start: 2020-03-23 | End: 2020-12-04 | Stop reason: SDUPTHER

## 2020-03-23 RX ORDER — PANTOPRAZOLE SODIUM 40 MG/1
40 TABLET, DELAYED RELEASE ORAL DAILY
Qty: 90 TABLET | Refills: 1 | Status: SHIPPED | OUTPATIENT
Start: 2020-03-23 | End: 2020-10-16

## 2020-03-23 RX ORDER — ROPINIROLE 0.5 MG/1
TABLET, FILM COATED ORAL
Qty: 270 TABLET | Refills: 1 | Status: SHIPPED | OUTPATIENT
Start: 2020-03-23 | End: 2020-12-04 | Stop reason: SDUPTHER

## 2020-03-23 RX ORDER — BUDESONIDE AND FORMOTEROL FUMARATE DIHYDRATE 160; 4.5 UG/1; UG/1
2 AEROSOL RESPIRATORY (INHALATION)
Qty: 1 INHALER | Refills: 5 | Status: SHIPPED | OUTPATIENT
Start: 2020-03-23 | End: 2020-12-04 | Stop reason: SDUPTHER

## 2020-03-23 RX ORDER — IBUPROFEN AND FAMOTIDINE 26.6; 8 MG/1; MG/1
1 TABLET, FILM COATED ORAL 3 TIMES DAILY
Qty: 90 TABLET | Refills: 5 | Status: SHIPPED | OUTPATIENT
Start: 2020-03-23 | End: 2020-09-14 | Stop reason: SDUPTHER

## 2020-03-23 RX ORDER — VENLAFAXINE HYDROCHLORIDE 37.5 MG/1
37.5 CAPSULE, EXTENDED RELEASE ORAL DAILY
Qty: 90 CAPSULE | Refills: 1 | Status: SHIPPED | OUTPATIENT
Start: 2020-03-23 | End: 2020-12-04 | Stop reason: SDUPTHER

## 2020-03-23 RX ORDER — ALBUTEROL SULFATE 90 UG/1
2 AEROSOL, METERED RESPIRATORY (INHALATION) EVERY 6 HOURS PRN
Qty: 1 INHALER | Refills: 5 | Status: SHIPPED | OUTPATIENT
Start: 2020-03-23 | End: 2020-12-04 | Stop reason: SDUPTHER

## 2020-05-11 RX ORDER — NITROFURANTOIN 25; 75 MG/1; MG/1
CAPSULE ORAL
Qty: 30 CAPSULE | Refills: 2 | Status: SHIPPED | OUTPATIENT
Start: 2020-05-11 | End: 2021-04-21

## 2020-09-14 RX ORDER — IBUPROFEN AND FAMOTIDINE 800; 26.6 MG/1; MG/1
1 TABLET, COATED ORAL 3 TIMES DAILY
Qty: 90 TABLET | Refills: 5 | Status: SHIPPED | OUTPATIENT
Start: 2020-09-14 | End: 2020-09-29 | Stop reason: SDUPTHER

## 2020-09-29 RX ORDER — IBUPROFEN AND FAMOTIDINE 800; 26.6 MG/1; MG/1
1 TABLET, COATED ORAL 3 TIMES DAILY
Qty: 90 TABLET | Refills: 5 | Status: SHIPPED | OUTPATIENT
Start: 2020-09-29 | End: 2020-12-04

## 2020-10-16 RX ORDER — PANTOPRAZOLE SODIUM 40 MG/1
TABLET, DELAYED RELEASE ORAL
Qty: 90 TABLET | Refills: 0 | Status: SHIPPED | OUTPATIENT
Start: 2020-10-16 | End: 2020-12-04 | Stop reason: SDUPTHER

## 2020-11-25 DIAGNOSIS — G25.81 RESTLESS LEGS SYNDROME (RLS): Chronic | ICD-10-CM

## 2020-11-25 DIAGNOSIS — F41.9 ANXIETY: ICD-10-CM

## 2020-11-30 RX ORDER — IRON PS COMPLEX/B12/FOLIC ACID 150-25-1
CAPSULE ORAL
Qty: 90 CAPSULE | Refills: 1 | OUTPATIENT
Start: 2020-11-30

## 2020-11-30 RX ORDER — VENLAFAXINE HYDROCHLORIDE 37.5 MG/1
CAPSULE, EXTENDED RELEASE ORAL
Qty: 90 CAPSULE | Refills: 1 | OUTPATIENT
Start: 2020-11-30

## 2020-11-30 RX ORDER — ATORVASTATIN CALCIUM 20 MG/1
TABLET, FILM COATED ORAL
Qty: 90 TABLET | Refills: 1 | OUTPATIENT
Start: 2020-11-30

## 2020-11-30 RX ORDER — ROPINIROLE 0.5 MG/1
TABLET, FILM COATED ORAL
Qty: 270 TABLET | Refills: 1 | OUTPATIENT
Start: 2020-11-30

## 2020-12-04 ENCOUNTER — OFFICE VISIT (OUTPATIENT)
Dept: FAMILY MEDICINE CLINIC | Facility: CLINIC | Age: 55
End: 2020-12-04

## 2020-12-04 VITALS
HEIGHT: 62 IN | SYSTOLIC BLOOD PRESSURE: 138 MMHG | WEIGHT: 137 LBS | BODY MASS INDEX: 25.21 KG/M2 | DIASTOLIC BLOOD PRESSURE: 69 MMHG

## 2020-12-04 DIAGNOSIS — E78.2 MIXED HYPERLIPIDEMIA: Primary | Chronic | ICD-10-CM

## 2020-12-04 DIAGNOSIS — D50.9 IRON DEFICIENCY ANEMIA, UNSPECIFIED IRON DEFICIENCY ANEMIA TYPE: Chronic | ICD-10-CM

## 2020-12-04 DIAGNOSIS — G25.81 RESTLESS LEGS SYNDROME (RLS): Chronic | ICD-10-CM

## 2020-12-04 DIAGNOSIS — J45.20 MILD INTERMITTENT ASTHMA WITHOUT COMPLICATION: Chronic | ICD-10-CM

## 2020-12-04 DIAGNOSIS — F41.9 ANXIETY: ICD-10-CM

## 2020-12-04 DIAGNOSIS — K21.9 GASTROESOPHAGEAL REFLUX DISEASE, UNSPECIFIED WHETHER ESOPHAGITIS PRESENT: Chronic | ICD-10-CM

## 2020-12-04 PROCEDURE — 99443 PR PHYS/QHP TELEPHONE EVALUATION 21-30 MIN: CPT | Performed by: INTERNAL MEDICINE

## 2020-12-04 RX ORDER — CETIRIZINE HYDROCHLORIDE 10 MG/1
10 TABLET ORAL NIGHTLY
Qty: 90 TABLET | Refills: 3 | Status: SHIPPED | OUTPATIENT
Start: 2020-12-04

## 2020-12-04 RX ORDER — ROPINIROLE 0.5 MG/1
TABLET, FILM COATED ORAL
Qty: 270 TABLET | Refills: 3 | Status: SHIPPED | OUTPATIENT
Start: 2020-12-04

## 2020-12-04 RX ORDER — FLUTICASONE PROPIONATE 50 MCG
2 SPRAY, SUSPENSION (ML) NASAL DAILY
Qty: 1 BOTTLE | Refills: 5 | Status: SHIPPED | OUTPATIENT
Start: 2020-12-04

## 2020-12-04 RX ORDER — VENLAFAXINE HYDROCHLORIDE 75 MG/1
75 CAPSULE, EXTENDED RELEASE ORAL DAILY
Qty: 90 CAPSULE | Refills: 3 | Status: SHIPPED | OUTPATIENT
Start: 2020-12-04

## 2020-12-04 RX ORDER — PANTOPRAZOLE SODIUM 40 MG/1
40 TABLET, DELAYED RELEASE ORAL DAILY
Qty: 90 TABLET | Refills: 3 | Status: SHIPPED | OUTPATIENT
Start: 2020-12-04

## 2020-12-04 RX ORDER — IRON PS COMPLEX/B12/FOLIC ACID 150-25-1
1 CAPSULE ORAL DAILY
Qty: 90 CAPSULE | Refills: 3 | Status: SHIPPED | OUTPATIENT
Start: 2020-12-04

## 2020-12-04 RX ORDER — ALBUTEROL SULFATE 90 UG/1
2 AEROSOL, METERED RESPIRATORY (INHALATION) EVERY 6 HOURS PRN
Qty: 1 G | Refills: 5 | Status: SHIPPED | OUTPATIENT
Start: 2020-12-04

## 2020-12-04 RX ORDER — BUDESONIDE AND FORMOTEROL FUMARATE DIHYDRATE 160; 4.5 UG/1; UG/1
2 AEROSOL RESPIRATORY (INHALATION)
Qty: 1 INHALER | Refills: 5 | Status: SHIPPED | OUTPATIENT
Start: 2020-12-04

## 2020-12-04 RX ORDER — ATORVASTATIN CALCIUM 20 MG/1
10-20 TABLET, FILM COATED ORAL DAILY
Qty: 90 TABLET | Refills: 3 | Status: SHIPPED | OUTPATIENT
Start: 2020-12-04

## 2020-12-04 NOTE — PROGRESS NOTES
Subjective      You have chosen to receive care through a telephone visit. Do you consent to use a telephone visit for your medical care today? Yes  Total visit time: 22 minutes.      History of Present Illness     Anel Mullen is a 55 y.o. female who receives care via telephone visit for overdue annual follow up.  Chronic medical issues included hyperlipidemia, GERD, and iron deficiency anemia, for which she continues on oral iron twice weekly.  She feels like she could use a dose increase on the low dose Effexor XR as this has been a difficult year    She had a flare of allergy symptoms, especially episodic blurred vision due to mucous, a couple of weeks ago, which has since resolved.  Asthma management has been good.  She uses Flonase and takes Zyrtec to manage symptoms.   She has Symbicort and albuterol inhalers to use as needed.  Denies COVID symptoms including anosmia or ageusia.        Weight is stable.  Blood pressure at goal.      Total cholesterol remains slightly above goal, although, excellent HDL at 62 gives her a favorable ratio with daily Lipitor 20 mg daily.       B-12 and iron normal range with oral iron three days weekly.     The patient's relevant past medical, surgical, and social history was reviewed in Epic.   Lab results are reviewed with the patient today.  CBC unremarkable. Normal renal and liver function.  Fasting glucose 100.  Total cholesterol 220.  LDL cholesterol 133.  HDL 62.  Triglycerides 132.  B-12 849.  Serum iron 76.  Normal thyroid screen.       Review of Systems   Constitutional: Negative for chills, fatigue and fever.   HENT: Negative for congestion, ear pain, postnasal drip, sinus pressure and sore throat.    Respiratory: Negative for cough, shortness of breath and wheezing.    Cardiovascular: Negative for chest pain, palpitations and leg swelling.   Gastrointestinal: Negative for abdominal pain, blood in stool, constipation, diarrhea, nausea and vomiting.   Endocrine:  "Negative for cold intolerance, heat intolerance, polydipsia and polyuria.   Genitourinary: Negative for dysuria, frequency, hematuria and urgency.   Musculoskeletal: Positive for arthralgias.   Skin: Negative for rash.   Neurological: Negative for syncope and weakness.        Objective      Visit Vitals  /69   Ht 157.5 cm (62\")   Wt 62.1 kg (137 lb)   LMP 01/31/2009 (Approximate)   Breastfeeding No   BMI 25.06 kg/m²       Physical Exam      Assessment/Plan      I sent a prescription for increased dose of Effexor XR 75 mg to take one q.d. for JHONY.  Recommended nonpharmacologic measures including regular exercise.       Continue oral iron three days weekly.  Iron and B-12 levels at goal.      Continue on Protonix daily for management of GERD symptoms.      Continue with the Requip for restless leg symptoms.     Continue Zyrtec and Flonase nasal spray for seasonal allergic rhinitis.      She has albuterol and Symbicort to use as needed for asthma control, which is stable.        Continue the Lipitor 20 mg daily.  Cholesterol ratio is at goal with her high HDL despite total cholesterol slightly above goal.  Pursue dietary efforts.       Continue other medications and vitamin and mineral supplements to treat additional medical problems which we addressed today.  Return in one year for annual exam with fasting labs one week prior or sooner if needed.     Scribed for Dr. Bryan by [facility].     Diagnoses and all orders for this visit:    Mixed hyperlipidemia  -     CBC Auto Differential; Future  -     Comprehensive Metabolic Panel; Future  -     Lipid Panel; Future  -     TSH; Future  -     Vitamin D 25 Hydroxy; Future    Iron deficiency anemia, unspecified iron deficiency anemia type  -     Ferritin; Future  -     Iron Profile; Future  -     Vitamin B12; Future  -     Vitamin D 25 Hydroxy; Future    Mild intermittent asthma without complication  -     Vitamin D 25 Hydroxy; Future    Anxiety  -     " venlafaxine XR (EFFEXOR-XR) 75 MG 24 hr capsule; Take 1 capsule by mouth Daily.  -     Vitamin D 25 Hydroxy; Future    Restless legs syndrome (RLS)  -     venlafaxine XR (EFFEXOR-XR) 75 MG 24 hr capsule; Take 1 capsule by mouth Daily.  -     Vitamin D 25 Hydroxy; Future    Gastroesophageal reflux disease, unspecified whether esophagitis present  -     Vitamin D 25 Hydroxy; Future    Other orders  -     atorvastatin (LIPITOR) 20 MG tablet; Take 0.5-1 tablets by mouth Daily.  -     budesonide-formoterol (SYMBICORT) 160-4.5 MCG/ACT inhaler; Inhale 2 puffs 2 (Two) Times a Day.  -     albuterol sulfate  (90 Base) MCG/ACT inhaler; Inhale 2 puffs Every 6 (Six) Hours As Needed for Wheezing.  -     fluticasone (FLONASE) 50 MCG/ACT nasal spray; 2 sprays into the nostril(s) as directed by provider Daily.  -     iron polysacch complex-B12-FA (Poly-Iron 150 Forte) 150-0.025-1 MG capsule capsule; Take 1 capsule by mouth Daily.  -     pantoprazole (PROTONIX) 40 MG EC tablet; Take 1 tablet by mouth Daily.  -     rOPINIRole (REQUIP) 0.5 MG tablet; 1 after work and 2 tablets in the evening as directed.  -     cetirizine (zyrTEC) 10 MG tablet; Take 1 tablet by mouth Every Night.        No visits with results within 3 Week(s) from this visit.   Latest known visit with results is:   Office Visit on 02/14/2020   Component Date Value Ref Range Status   • Case Report 02/14/2020    Final                    Value:Gynecologic Cytology Report                       Case: [id number]                                  Authorizing Provider:  Domenica Smith    Collected:           02/14/2020 11:57 AM                                 RICKY Jurado                                                                  Ordering Location:     [facility]     Received:            02/14/2020 12:44 PM                                 [facility]                                                               First Screen:          Gerald  Barbara                                                              Specimen:    Liquid-Based Pap, Screening, Cervix                                                       • Interpretation 02/14/2020 Negative for intraepithelial lesion or malignancy    Final   • General Categorization 02/14/2020 Within normal limits   Final   • Other Findings 02/14/2020 Mild inflammation   Final   • Specimen Adequacy 02/14/2020 Satisfactory for evaluation   Final   • Additional Information 02/14/2020    Final                    Value:This result contains rich text formatting which cannot be displayed here.   • HPV Aptima 02/14/2020 Negative  Negative Final    This nucleic acid amplification test detects fourteen high-risk  HPV types (16,18,31,33,35,39,45,51,52,56,58,59,66,68) without  differentiation.   ]

## 2020-12-04 NOTE — PATIENT INSTRUCTIONS
Exercising to Stay Healthy  To become healthy and stay healthy, it is recommended that you do moderate-intensity and vigorous-intensity exercise. You can tell that you are exercising at a moderate intensity if your heart starts beating faster and you start breathing faster but can still hold a conversation. You can tell that you are exercising at a vigorous intensity if you are breathing much harder and faster and cannot hold a conversation while exercising.  Exercising regularly is important. It has many health benefits, such as:  · Improving overall fitness, flexibility, and endurance.  · Increasing bone density.  · Helping with weight control.  · Decreasing body fat.  · Increasing muscle strength.  · Reducing stress and tension.  · Improving overall health.  How often should I exercise?  Choose an activity that you enjoy, and set realistic goals. Your health care provider can help you make an activity plan that works for you.  Exercise regularly as told by your health care provider. This may include:  · Doing strength training two times a week, such as:  ? Lifting weights.  ? Using resistance bands.  ? Push-ups.  ? Sit-ups.  ? Yoga.  · Doing a certain intensity of exercise for a given amount of time. Choose from these options:  ? A total of 150 minutes of moderate-intensity exercise every week.  ? A total of 75 minutes of vigorous-intensity exercise every week.  ? A mix of moderate-intensity and vigorous-intensity exercise every week.  Children, pregnant women, people who have not exercised regularly, people who are overweight, and older adults may need to talk with a health care provider about what activities are safe to do. If you have a medical condition, be sure to talk with your health care provider before you start a new exercise program.  What are some exercise ideas?  Moderate-intensity exercise ideas include:  · Walking 1 mile (1.6 km) in about 15  minutes.  · Biking.  · Hiking.  · Golfing.  · Dancing.  · Water aerobics.  Vigorous-intensity exercise ideas include:  · Walking 4.5 miles (7.2 km) or more in about 1 hour.  · Jogging or running 5 miles (8 km) in about 1 hour.  · Biking 10 miles (16.1 km) or more in about 1 hour.  · Lap swimming.  · Roller-skating or in-line skating.  · Cross-country skiing.  · Vigorous competitive sports, such as football, basketball, and soccer.  · Jumping rope.  · Aerobic dancing.  What are some everyday activities that can help me to get exercise?  · Yard work, such as:  ? Pushing a .  ? Raking and bagging leaves.  · Washing your car.  · Pushing a stroller.  · Shoveling snow.  · Gardening.  · Washing windows or floors.  How can I be more active in my day-to-day activities?  · Use stairs instead of an elevator.  · Take a walk during your lunch break.  · If you drive, park your car farther away from your work or school.  · If you take public transportation, get off one stop early and walk the rest of the way.  · Stand up or walk around during all of your indoor phone calls.  · Get up, stretch, and walk around every 30 minutes throughout the day.  · Enjoy exercise with a friend. Support to continue exercising will help you keep a regular routine of activity.  What guidelines can I follow while exercising?  · Before you start a new exercise program, talk with your health care provider.  · Do not exercise so much that you hurt yourself, feel dizzy, or get very short of breath.  · Wear comfortable clothes and wear shoes with good support.  · Drink plenty of water while you exercise to prevent dehydration or heat stroke.  · Work out until your breathing and your heartbeat get faster.  Where to find more information  · [place] Department of Health and Human Services: www.hhs.gov  · Centers for Disease Control and Prevention (CDC): www.cdc.gov  Summary  · Exercising regularly is important. It will improve your overall fitness,  flexibility, and endurance.  · Regular exercise also will improve your overall health. It can help you control your weight, reduce stress, and improve your bone density.  · Do not exercise so much that you hurt yourself, feel dizzy, or get very short of breath.  · Before you start a new exercise program, talk with your health care provider.  This information is not intended to replace advice given to you by your health care provider. Make sure you discuss any questions you have with your health care provider.  Document Revised: 11/30/2018 Document Reviewed: 11/08/2018  Elsevier Patient Education © 2020 [employer]

## 2021-04-21 RX ORDER — NITROFURANTOIN 25; 75 MG/1; MG/1
CAPSULE ORAL
Qty: 30 CAPSULE | Refills: 2 | Status: SHIPPED | OUTPATIENT
Start: 2021-04-21

## 2021-08-11 ENCOUNTER — TRANSCRIBE ORDERS (OUTPATIENT)
Dept: MAMMOGRAPHY | Facility: CLINIC | Age: 56
End: 2021-08-11

## 2021-08-11 DIAGNOSIS — Z12.31 ENCOUNTER FOR SCREENING MAMMOGRAM FOR MALIGNANT NEOPLASM OF BREAST: Primary | ICD-10-CM

## 2022-06-29 ENCOUNTER — TRANSCRIBE ORDERS (OUTPATIENT)
Dept: GENERAL RADIOLOGY | Facility: CLINIC | Age: 57
End: 2022-06-29

## 2022-06-29 DIAGNOSIS — M25.429 PAIN AND SWELLING OF ELBOW, UNSPECIFIED LATERALITY: Primary | ICD-10-CM

## 2022-06-29 DIAGNOSIS — M25.521 PAIN OF BOTH ELBOWS: ICD-10-CM

## 2022-06-29 DIAGNOSIS — M25.522 PAIN OF BOTH ELBOWS: ICD-10-CM

## 2022-06-29 DIAGNOSIS — M25.529 PAIN AND SWELLING OF ELBOW, UNSPECIFIED LATERALITY: Primary | ICD-10-CM

## 2023-07-26 ENCOUNTER — OFFICE VISIT (OUTPATIENT)
Dept: SURGERY | Facility: CLINIC | Age: 58
End: 2023-07-26
Payer: COMMERCIAL

## 2023-07-26 VITALS
BODY MASS INDEX: 28.82 KG/M2 | OXYGEN SATURATION: 97 % | TEMPERATURE: 97.5 F | HEIGHT: 62 IN | WEIGHT: 156.6 LBS | DIASTOLIC BLOOD PRESSURE: 80 MMHG | HEART RATE: 93 BPM | SYSTOLIC BLOOD PRESSURE: 144 MMHG

## 2023-07-26 DIAGNOSIS — R92.8 ABNORMAL MAMMOGRAM OF LEFT BREAST: Primary | ICD-10-CM

## 2023-07-26 PROCEDURE — 99204 OFFICE O/P NEW MOD 45 MIN: CPT | Performed by: SURGERY

## 2023-07-26 RX ORDER — MAGNESIUM OXIDE 400 MG/1
400 TABLET ORAL DAILY
COMMUNITY

## 2023-07-26 RX ORDER — LIDOCAINE HYDROCHLORIDE AND EPINEPHRINE 10; 10 MG/ML; UG/ML
20 INJECTION, SOLUTION INFILTRATION; PERINEURAL ONCE
Status: CANCELLED | OUTPATIENT
Start: 2023-07-26 | End: 2023-07-26

## 2023-07-26 RX ORDER — FERROUS GLUCONATE 324(37.5)
TABLET ORAL
COMMUNITY

## 2023-07-26 RX ORDER — OXYCODONE HYDROCHLORIDE AND ACETAMINOPHEN 5; 325 MG/1; MG/1
1 TABLET ORAL ONCE
Status: CANCELLED | OUTPATIENT
Start: 2023-07-26 | End: 2023-07-26

## 2023-07-26 RX ORDER — DIAZEPAM 5 MG/1
5 TABLET ORAL ONCE
Status: CANCELLED | OUTPATIENT
Start: 2023-07-26 | End: 2023-07-26

## 2023-07-26 RX ORDER — ALBUTEROL SULFATE 2.5 MG/3ML
2.5 SOLUTION RESPIRATORY (INHALATION)
COMMUNITY
Start: 2023-04-09 | End: 2024-04-09

## 2023-07-26 RX ORDER — THIAMINE HCL 100 MG
2 TABLET ORAL DAILY
COMMUNITY

## 2023-07-26 NOTE — H&P (VIEW-ONLY)
Chief Complaint   Patient presents with    Consult     Ref: Lucho, abnormal mammogram          58-year-old female who was found to have a BI-RADS 4 lesion on diagnostic mammogram.  This was found on screening mammography.  She denies any recent changes in her breast such as new lumps or bumps, skin changes, or nipple discharge.  Her age of menarche was approximately 15.  Her age of first live birth was 22 and she did not breast-feed.  Her age of menopause was 44 and she did use an estrogen cream for about 1 year.  She has no history of chest wall radiation or previous breast biopsies.  She has no family history of breast cancer, but her mother and sister both had breast cysts that were biopsied and benign.      Past Medical History:   Diagnosis Date    Abnormal mammogram of left breast     Allergic rhinitis     Allergic rhinitis - Seasonal, particularly tree pollens, etc.       Carpal tunnel syndrome     Cystic acne     Cystic acne - Reassurance given; not cancer       Dupuytren's contracture of both hands 09/11/2018    Encounter for screening mammogram for malignant neoplasm of breast     Eustachian tube disorder     GERD (gastroesophageal reflux disease)     Hyperlipidemia     Changed Pravachol to Lipitor, 6/2014    Iron deficiency anemia     Iron deficiency anemia - Discovered summer 2014. On oral iron replacement.       Mild intermittent asthma     Palpitations     Palpitations - Back to baseline. Unremarkable Holter March 2013       Plantar fasciitis     Plantar fasciitis - Left lower extremity. Recurrent. Controlled 5/2/2014       Pruritic rash     Pruritic rash - Apparently related to environmental allergens. Resolved 6/2014. Some recurrence on the right upper extremity, 1/2015       Restless legs syndrome (RLS) 09/11/2018    Seafood allergy 02/11/2019    Tendinitis     Tendinitis AND/OR tenosynovitis of the elbow region - Right          Past Surgical History:   Procedure Laterality Date    CARPAL TUNNEL  RELEASE Right 02/17/2017    Dr. Mal[place]    LACERATION REPAIR Right     index  finger    TONSILLECTOMY      ULNAR TUNNEL RELEASE Right     WISDOM TOOTH EXTRACTION      4         Current Outpatient Medications:     albuterol (PROVENTIL) (2.5 MG/3ML) 0.083% nebulizer solution, Take 2.5 mg by nebulization., Disp: , Rfl:     albuterol sulfate  (90 Base) MCG/ACT inhaler, Inhale 2 puffs Every 6 (Six) Hours As Needed for Wheezing., Disp: 1 g, Rfl: 5    atorvastatin (LIPITOR) 20 MG tablet, Take 0.5-1 tablets by mouth Daily., Disp: 90 tablet, Rfl: 3    Biotin 5000 MCG capsule, Take  by mouth., Disp: , Rfl:     Budeson-Glycopyrrol-Formoterol (BREZTRI) 160-9-4.8 MCG/ACT aerosol inhaler, Inhale 2 puffs 2 (Two) Times a Day., Disp: , Rfl:     Calcium Citrate-Vitamin D3 (CITRACAL) 315-6.25 MG-MCG tablet tablet, Take 2 tablets by mouth Daily., Disp: , Rfl:     cetirizine (zyrTEC) 10 MG tablet, Take 1 tablet by mouth Every Night., Disp: 90 tablet, Rfl: 3    ferrous gluconate 324 (37.5 Fe) MG tablet tablet, Take  by mouth Daily With Breakfast., Disp: , Rfl:     fluticasone (FLONASE) 50 MCG/ACT nasal spray, 2 sprays into the nostril(s) as directed by provider Daily., Disp: 1 bottle, Rfl: 5    magnesium oxide (MAG-OX) 400 MG tablet, Take 1 tablet by mouth Daily. With zinc, Disp: , Rfl:     Nebulizer misc, r05.1, r06.2, j45.21, r07.89, Disp: , Rfl:     ondansetron ODT (ZOFRAN ODT) 4 MG disintegrating tablet, Take 1 tablet by mouth Every 8 (Eight) Hours As Needed for Nausea or Vomiting., Disp: 21 tablet, Rfl: 0    pantoprazole (PROTONIX) 40 MG EC tablet, Take 1 tablet by mouth Daily., Disp: 90 tablet, Rfl: 3    rOPINIRole (REQUIP) 0.5 MG tablet, 1 after work and 2 tablets in the evening as directed., Disp: 270 tablet, Rfl: 3    triamcinolone (KENALOG) 0.1 % cream, Apply  topically 3 (Three) Times a Day As Needed for Rash., Disp: 75 g, Rfl: 5    venlafaxine XR (EFFEXOR-XR) 75 MG 24 hr capsule, Take 1 capsule by mouth  Daily., Disp: 90 capsule, Rfl: 3    iron polysacch complex-B12-FA (Poly-Iron 150 Forte) 150-0.025-1 MG capsule capsule, Take 1 capsule by mouth Daily., Disp: 90 capsule, Rfl: 3    pseudoephedrine (SUDAFED) 30 MG tablet, Take 2 tablets by mouth Every 6 (Six) Hours As Needed for Congestion., Disp: 30 tablet, Rfl: 0    Allergies   Allergen Reactions    Codeine Nausea And Vomiting     Abdominal pain    Bactrim [Sulfamethoxazole-Trimethoprim] Irritability    Shellfish-Derived Products Swelling     Gums swelling, abdominal pain, itching if touches skin    Sulfa Antibiotics Other (See Comments)     shaking    Meloxicam Rash       Immunization History   Administered Date(s) Administered    COVID-19 (Zokos) 01/31/2022    Hepatitis B Adult/Adolescent IM 08/25/2014, 09/25/2014    MMR 08/25/2014    Td (TDVAX) 03/23/1998    Tetanus 01/01/1998        Family History   Problem Relation Age of Onset    Hypertension Mother     Diabetes Mother     Hyperlipidemia Mother     Osteoporosis Mother     Hypertension Father     Diabetes Father     Hyperlipidemia Father     Pancreatitis Father     Osteoarthritis Sister     Hyperlipidemia Sister     No Known Problems Son     Colon cancer Maternal Grandmother     Diabetes Maternal Grandfather     Heart disease Paternal Grandmother     No Known Problems Paternal Grandfather        Social History     Socioeconomic History    Marital status:    Tobacco Use    Smoking status: Never     Passive exposure: Past    Smokeless tobacco: Never    Tobacco comments:     Passive as a child for 18 years   Vaping Use    Vaping Use: Never used   Substance and Sexual Activity    Alcohol use: Yes     Comment: occasionally    Drug use: Never    Sexual activity: Defer     Partners: Male     Birth control/protection: Post-menopausal       Review of Systems   Constitutional:  Positive for appetite change, fatigue and unexpected weight change.   HENT:  Positive for postnasal drip, tinnitus and voice change.     Eyes:  Positive for redness, itching and visual disturbance.   Respiratory:  Positive for apnea.    Musculoskeletal:  Positive for neck stiffness.   Allergic/Immunologic: Positive for environmental allergies and food allergies.   Neurological:  Positive for dizziness and light-headedness.   Psychiatric/Behavioral:  The patient is nervous/anxious.    All other systems reviewed and are negative.    Vitals:    07/26/23 1056   BP: 144/80   Pulse: 93   Temp: 97.5 °F (36.4 °C)   SpO2: 97%       Physical Exam  Constitutional:       Appearance: Normal appearance.   HENT:      Head: Normocephalic and atraumatic.   Cardiovascular:      Rate and Rhythm: Normal rate and regular rhythm.   Pulmonary:      Effort: Pulmonary effort is normal. No respiratory distress.   Chest:   Breasts:     Right: No swelling, inverted nipple, mass or skin change.      Left: No swelling, inverted nipple, mass or skin change.   Lymphadenopathy:      Upper Body:      Right upper body: No supraclavicular or axillary adenopathy.      Left upper body: No supraclavicular or axillary adenopathy.   Neurological:      General: No focal deficit present.      Mental Status: She is alert and oriented to person, place, and time.   Psychiatric:         Mood and Affect: Mood normal.         Behavior: Behavior normal.     BI-RADS 4 lesion in the left breast    ASSESSMENT    Diagnoses and all orders for this visit:    1. Abnormal mammogram of left breast (Primary)        PLAN    1.  I counseled the patient on left breast ultrasound-guided biopsy.  We discussed the risks of procedure including bleeding and infection.  She expressed understanding wish to proceed.  I will see her back approximately 1 week after to discuss the results.              This document has been electronically signed by Love Cardona CSA on July 26, 2023 11:19 CDT

## 2023-07-26 NOTE — PROGRESS NOTES
Chief Complaint   Patient presents with    Consult     Ref: Lucho, abnormal mammogram          58-year-old female who was found to have a BI-RADS 4 lesion on diagnostic mammogram.  This was found on screening mammography.  She denies any recent changes in her breast such as new lumps or bumps, skin changes, or nipple discharge.  Her age of menarche was approximately 15.  Her age of first live birth was 22 and she did not breast-feed.  Her age of menopause was 44 and she did use an estrogen cream for about 1 year.  She has no history of chest wall radiation or previous breast biopsies.  She has no family history of breast cancer, but her mother and sister both had breast cysts that were biopsied and benign.      Past Medical History:   Diagnosis Date    Abnormal mammogram of left breast     Allergic rhinitis     Allergic rhinitis - Seasonal, particularly tree pollens, etc.       Carpal tunnel syndrome     Cystic acne     Cystic acne - Reassurance given; not cancer       Dupuytren's contracture of both hands 09/11/2018    Encounter for screening mammogram for malignant neoplasm of breast     Eustachian tube disorder     GERD (gastroesophageal reflux disease)     Hyperlipidemia     Changed Pravachol to Lipitor, 6/2014    Iron deficiency anemia     Iron deficiency anemia - Discovered summer 2014. On oral iron replacement.       Mild intermittent asthma     Palpitations     Palpitations - Back to baseline. Unremarkable Holter March 2013       Plantar fasciitis     Plantar fasciitis - Left lower extremity. Recurrent. Controlled 5/2/2014       Pruritic rash     Pruritic rash - Apparently related to environmental allergens. Resolved 6/2014. Some recurrence on the right upper extremity, 1/2015       Restless legs syndrome (RLS) 09/11/2018    Seafood allergy 02/11/2019    Tendinitis     Tendinitis AND/OR tenosynovitis of the elbow region - Right          Past Surgical History:   Procedure Laterality Date    CARPAL TUNNEL  RELEASE Right 02/17/2017    Dr. Malia [place]    LACERATION REPAIR Right     index  finger    TONSILLECTOMY      ULNAR TUNNEL RELEASE Right     WISDOM TOOTH EXTRACTION      4         Current Outpatient Medications:     albuterol (PROVENTIL) (2.5 MG/3ML) 0.083% nebulizer solution, Take 2.5 mg by nebulization., Disp: , Rfl:     albuterol sulfate  (90 Base) MCG/ACT inhaler, Inhale 2 puffs Every 6 (Six) Hours As Needed for Wheezing., Disp: 1 g, Rfl: 5    atorvastatin (LIPITOR) 20 MG tablet, Take 0.5-1 tablets by mouth Daily., Disp: 90 tablet, Rfl: 3    Biotin 5000 MCG capsule, Take  by mouth., Disp: , Rfl:     Budeson-Glycopyrrol-Formoterol (BREZTRI) 160-9-4.8 MCG/ACT aerosol inhaler, Inhale 2 puffs 2 (Two) Times a Day., Disp: , Rfl:     Calcium Citrate-Vitamin D3 (CITRACAL) 315-6.25 MG-MCG tablet tablet, Take 2 tablets by mouth Daily., Disp: , Rfl:     cetirizine (zyrTEC) 10 MG tablet, Take 1 tablet by mouth Every Night., Disp: 90 tablet, Rfl: 3    ferrous gluconate 324 (37.5 Fe) MG tablet tablet, Take  by mouth Daily With Breakfast., Disp: , Rfl:     fluticasone (FLONASE) 50 MCG/ACT nasal spray, 2 sprays into the nostril(s) as directed by provider Daily., Disp: 1 bottle, Rfl: 5    magnesium oxide (MAG-OX) 400 MG tablet, Take 1 tablet by mouth Daily. With zinc, Disp: , Rfl:     Nebulizer misc, r05.1, r06.2, j45.21, r07.89, Disp: , Rfl:     ondansetron ODT (ZOFRAN ODT) 4 MG disintegrating tablet, Take 1 tablet by mouth Every 8 (Eight) Hours As Needed for Nausea or Vomiting., Disp: 21 tablet, Rfl: 0    pantoprazole (PROTONIX) 40 MG EC tablet, Take 1 tablet by mouth Daily., Disp: 90 tablet, Rfl: 3    rOPINIRole (REQUIP) 0.5 MG tablet, 1 after work and 2 tablets in the evening as directed., Disp: 270 tablet, Rfl: 3    triamcinolone (KENALOG) 0.1 % cream, Apply  topically 3 (Three) Times a Day As Needed for Rash., Disp: 75 g, Rfl: 5    venlafaxine XR (EFFEXOR-XR) 75 MG 24 hr capsule, Take 1 capsule by mouth  Daily., Disp: 90 capsule, Rfl: 3    iron polysacch complex-B12-FA (Poly-Iron 150 Forte) 150-0.025-1 MG capsule capsule, Take 1 capsule by mouth Daily., Disp: 90 capsule, Rfl: 3    pseudoephedrine (SUDAFED) 30 MG tablet, Take 2 tablets by mouth Every 6 (Six) Hours As Needed for Congestion., Disp: 30 tablet, Rfl: 0    Allergies   Allergen Reactions    Codeine Nausea And Vomiting     Abdominal pain    Bactrim [Sulfamethoxazole-Trimethoprim] Irritability    Shellfish-Derived Products Swelling     Gums swelling, abdominal pain, itching if touches skin    Sulfa Antibiotics Other (See Comments)     shaking    Meloxicam Rash       Immunization History   Administered Date(s) Administered    COVID-19 ([employer]) 01/31/2022    Hepatitis B Adult/Adolescent IM 08/25/2014, 09/25/2014    MMR 08/25/2014    Td (TDVAX) 03/23/1998    Tetanus 01/01/1998        Family History   Problem Relation Age of Onset    Hypertension Mother     Diabetes Mother     Hyperlipidemia Mother     Osteoporosis Mother     Hypertension Father     Diabetes Father     Hyperlipidemia Father     Pancreatitis Father     Osteoarthritis Sister     Hyperlipidemia Sister     No Known Problems Son     Colon cancer Maternal Grandmother     Diabetes Maternal Grandfather     Heart disease Paternal Grandmother     No Known Problems Paternal Grandfather        Social History     Socioeconomic History    Marital status:    Tobacco Use    Smoking status: Never     Passive exposure: Past    Smokeless tobacco: Never    Tobacco comments:     Passive as a child for 18 years   Vaping Use    Vaping Use: Never used   Substance and Sexual Activity    Alcohol use: Yes     Comment: occasionally    Drug use: Never    Sexual activity: Defer     Partners: Male     Birth control/protection: Post-menopausal       Review of Systems   Constitutional:  Positive for appetite change, fatigue and unexpected weight change.   HENT:  Positive for postnasal drip, tinnitus and voice change.     Eyes:  Positive for redness, itching and visual disturbance.   Respiratory:  Positive for apnea.    Musculoskeletal:  Positive for neck stiffness.   Allergic/Immunologic: Positive for environmental allergies and food allergies.   Neurological:  Positive for dizziness and light-headedness.   Psychiatric/Behavioral:  The patient is nervous/anxious.    All other systems reviewed and are negative.    Vitals:    07/26/23 1056   BP: 144/80   Pulse: 93   Temp: 97.5 °F (36.4 °C)   SpO2: 97%       Physical Exam  Constitutional:       Appearance: Normal appearance.   HENT:      Head: Normocephalic and atraumatic.   Cardiovascular:      Rate and Rhythm: Normal rate and regular rhythm.   Pulmonary:      Effort: Pulmonary effort is normal. No respiratory distress.   Chest:   Breasts:     Right: No swelling, inverted nipple, mass or skin change.      Left: No swelling, inverted nipple, mass or skin change.   Lymphadenopathy:      Upper Body:      Right upper body: No supraclavicular or axillary adenopathy.      Left upper body: No supraclavicular or axillary adenopathy.   Neurological:      General: No focal deficit present.      Mental Status: She is alert and oriented to person, place, and time.   Psychiatric:         Mood and Affect: Mood normal.         Behavior: Behavior normal.     BI-RADS 4 lesion in the left breast    ASSESSMENT    Diagnoses and all orders for this visit:    1. Abnormal mammogram of left breast (Primary)        PLAN    1.  I counseled the patient on left breast ultrasound-guided biopsy.  We discussed the risks of procedure including bleeding and infection.  She expressed understanding wish to proceed.  I will see her back approximately 1 week after to discuss the results.              This document has been electronically signed by Love Cardona CSA on July 26, 2023 11:19 CDT

## 2023-07-27 ENCOUNTER — LAB (OUTPATIENT)
Dept: LAB | Facility: OTHER | Age: 58
End: 2023-07-27
Payer: COMMERCIAL

## 2023-07-27 ENCOUNTER — OFFICE VISIT (OUTPATIENT)
Dept: FAMILY MEDICINE CLINIC | Facility: CLINIC | Age: 58
End: 2023-07-27
Payer: COMMERCIAL

## 2023-07-27 VITALS
HEIGHT: 62 IN | RESPIRATION RATE: 18 BRPM | BODY MASS INDEX: 28.71 KG/M2 | DIASTOLIC BLOOD PRESSURE: 90 MMHG | SYSTOLIC BLOOD PRESSURE: 150 MMHG | WEIGHT: 156 LBS | OXYGEN SATURATION: 99 % | HEART RATE: 105 BPM | TEMPERATURE: 97.5 F

## 2023-07-27 DIAGNOSIS — R74.8 ELEVATED LIVER ENZYMES: ICD-10-CM

## 2023-07-27 DIAGNOSIS — J45.909 MODERATE ASTHMA WITHOUT COMPLICATION, UNSPECIFIED WHETHER PERSISTENT: ICD-10-CM

## 2023-07-27 DIAGNOSIS — R53.83 FATIGUE, UNSPECIFIED TYPE: Primary | ICD-10-CM

## 2023-07-27 DIAGNOSIS — R53.83 FATIGUE, UNSPECIFIED TYPE: ICD-10-CM

## 2023-07-27 DIAGNOSIS — E55.9 VITAMIN D DEFICIENCY: ICD-10-CM

## 2023-07-27 DIAGNOSIS — K21.9 GASTROESOPHAGEAL REFLUX DISEASE, UNSPECIFIED WHETHER ESOPHAGITIS PRESENT: ICD-10-CM

## 2023-07-27 DIAGNOSIS — G47.30 SLEEP APNEA, UNSPECIFIED TYPE: ICD-10-CM

## 2023-07-27 DIAGNOSIS — G25.81 RESTLESS LEGS SYNDROME (RLS): Chronic | ICD-10-CM

## 2023-07-27 DIAGNOSIS — E78.5 HYPERLIPIDEMIA, UNSPECIFIED HYPERLIPIDEMIA TYPE: ICD-10-CM

## 2023-07-27 DIAGNOSIS — F33.1 MODERATE EPISODE OF RECURRENT MAJOR DEPRESSIVE DISORDER: ICD-10-CM

## 2023-07-27 DIAGNOSIS — I10 ESSENTIAL HYPERTENSION: ICD-10-CM

## 2023-07-27 DIAGNOSIS — R53.83 OTHER FATIGUE: Primary | ICD-10-CM

## 2023-07-27 PROCEDURE — 36415 COLL VENOUS BLD VENIPUNCTURE: CPT | Performed by: FAMILY MEDICINE

## 2023-07-27 PROCEDURE — 83010 ASSAY OF HAPTOGLOBIN QUANT: CPT | Performed by: FAMILY MEDICINE

## 2023-07-27 PROCEDURE — 84460 ALANINE AMINO (ALT) (SGPT): CPT | Performed by: FAMILY MEDICINE

## 2023-07-27 PROCEDURE — 82977 ASSAY OF GGT: CPT | Performed by: FAMILY MEDICINE

## 2023-07-27 PROCEDURE — 82728 ASSAY OF FERRITIN: CPT | Performed by: FAMILY MEDICINE

## 2023-07-27 PROCEDURE — 99214 OFFICE O/P EST MOD 30 MIN: CPT | Performed by: FAMILY MEDICINE

## 2023-07-27 PROCEDURE — 84478 ASSAY OF TRIGLYCERIDES: CPT | Performed by: FAMILY MEDICINE

## 2023-07-27 PROCEDURE — 82247 BILIRUBIN TOTAL: CPT | Performed by: FAMILY MEDICINE

## 2023-07-27 PROCEDURE — 83883 ASSAY NEPHELOMETRY NOT SPEC: CPT | Performed by: FAMILY MEDICINE

## 2023-07-27 PROCEDURE — 82465 ASSAY BLD/SERUM CHOLESTEROL: CPT | Performed by: FAMILY MEDICINE

## 2023-07-27 PROCEDURE — 82172 ASSAY OF APOLIPOPROTEIN: CPT | Performed by: FAMILY MEDICINE

## 2023-07-27 PROCEDURE — 84450 TRANSFERASE (AST) (SGOT): CPT | Performed by: FAMILY MEDICINE

## 2023-07-27 PROCEDURE — 82947 ASSAY GLUCOSE BLOOD QUANT: CPT | Performed by: FAMILY MEDICINE

## 2023-07-27 RX ORDER — ERGOCALCIFEROL 1.25 MG/1
50000 CAPSULE ORAL WEEKLY
Qty: 5 CAPSULE | Refills: 5 | Status: SHIPPED | OUTPATIENT
Start: 2023-07-27 | End: 2023-07-27 | Stop reason: SDUPTHER

## 2023-07-27 RX ORDER — ALBUTEROL SULFATE 90 UG/1
2 AEROSOL, METERED RESPIRATORY (INHALATION) EVERY 6 HOURS PRN
Qty: 8.5 G | Refills: 5 | Status: SHIPPED | OUTPATIENT
Start: 2023-07-27

## 2023-07-27 RX ORDER — ATORVASTATIN CALCIUM 40 MG/1
40 TABLET, FILM COATED ORAL DAILY
Qty: 30 TABLET | Refills: 5 | Status: SHIPPED | OUTPATIENT
Start: 2023-07-27 | End: 2023-07-27 | Stop reason: SDUPTHER

## 2023-07-27 RX ORDER — ERGOCALCIFEROL 1.25 MG/1
50000 CAPSULE ORAL WEEKLY
Qty: 15 CAPSULE | Refills: 3 | Status: SHIPPED | OUTPATIENT
Start: 2023-07-27

## 2023-07-27 RX ORDER — BUPROPION HYDROCHLORIDE 150 MG/1
150 TABLET ORAL DAILY
Qty: 30 TABLET | Refills: 5 | Status: SHIPPED | OUTPATIENT
Start: 2023-07-27 | End: 2023-07-27 | Stop reason: SDUPTHER

## 2023-07-27 RX ORDER — BUPROPION HYDROCHLORIDE 150 MG/1
150 TABLET ORAL DAILY
Qty: 90 TABLET | Refills: 3 | Status: SHIPPED | OUTPATIENT
Start: 2023-07-27

## 2023-07-27 RX ORDER — ROPINIROLE 1 MG/1
1 TABLET, FILM COATED ORAL 3 TIMES DAILY
Qty: 90 TABLET | Refills: 5 | Status: SHIPPED | OUTPATIENT
Start: 2023-07-27 | End: 2023-07-27 | Stop reason: SDUPTHER

## 2023-07-27 RX ORDER — ROPINIROLE 1 MG/1
1 TABLET, FILM COATED ORAL 3 TIMES DAILY
Qty: 270 TABLET | Refills: 3 | Status: SHIPPED | OUTPATIENT
Start: 2023-07-27

## 2023-07-27 RX ORDER — PANTOPRAZOLE SODIUM 40 MG/1
40 TABLET, DELAYED RELEASE ORAL DAILY
Qty: 90 TABLET | Refills: 3 | Status: SHIPPED | OUTPATIENT
Start: 2023-07-27

## 2023-07-27 RX ORDER — AMLODIPINE BESYLATE 5 MG/1
5 TABLET ORAL DAILY
COMMUNITY
End: 2023-07-27 | Stop reason: SDUPTHER

## 2023-07-27 RX ORDER — ATORVASTATIN CALCIUM 40 MG/1
40 TABLET, FILM COATED ORAL DAILY
Qty: 90 TABLET | Refills: 3 | Status: SHIPPED | OUTPATIENT
Start: 2023-07-27

## 2023-07-27 RX ORDER — AMLODIPINE BESYLATE 5 MG/1
5 TABLET ORAL DAILY
Qty: 90 TABLET | Refills: 3 | Status: SHIPPED | OUTPATIENT
Start: 2023-07-27

## 2023-07-27 NOTE — PROGRESS NOTES
Subjective   Anel Mullen is a 58 y.o. female.   Here today to establish care and has several concerns.  She is concerned about fatigue, depression, feeling emotionally labile, and sleeps a lot.    She has been feeling tired for several months now.  She had a panel of lab work done prior to the visit which we have spent some time reviewing today.  She has low vitamin D which could mildly contribute.  Of note is that she does have sleep apnea and only recently started using the CPAP.  The sleep apnea may explain the elevation of her CO2 on her metabolic panel.    She has hyperlipidemia.  She admits that she has not been taking the Lipitor respectfully if she should.  Her LDL is markedly elevated however she has a strong cardiovascular family history.    Her liver enzymes are mildly elevated and have been it looks like on her last couple of panels.  I suspect this is related to hepatic steatosis given how high her cholesterol is.    She has restless legs.  Her iron saturations were a bit low.  She has not checked a ferritin level.  She is on Requip and wonders about increasing or changing as this is momentarily helpful.    She has had some issues with recurrent depression.  Currently is on Effexor which does not help that much.  Has tried Prozac without success in the past.    Her blood pressure has been elevated we spent some time talking about this.  She plans some lifestyle changes and admits she has gained a bit of weight.  She is on amlodipine 5 mg already.    She is having a breast biopsy tomorrow for an abnormality seen on mammogram.    Needs refills of routine meds including vitamin D, Protonix for reflux, and her inhaler for asthma.    History of Present Illness    The following portions of the patient's history were reviewed and updated as appropriate: allergies, current medications, past family history, past medical history, past social history, past surgical history, and problem list.    Review of  "Systems   Constitutional:  Positive for fatigue.   Musculoskeletal:  Positive for myalgias.   Psychiatric/Behavioral:  Positive for dysphoric mood.    All other systems reviewed and are negative.    Objective   Body mass index is 28.53 kg/m².  Vitals:    07/27/23 1028   BP: 150/90   Pulse: 105   Resp: 18   Temp: 97.5 °F (36.4 °C)   SpO2: 99%   Weight: 70.8 kg (156 lb)   Height: 157.5 cm (62.01\")       Physical Exam  Vitals and nursing note reviewed.   Constitutional:       Appearance: She is well-developed.   HENT:      Head: Normocephalic and atraumatic.      Nose: Nose normal.   Eyes:      Conjunctiva/sclera: Conjunctivae normal.      Pupils: Pupils are equal, round, and reactive to light.   Neck:      Thyroid: No thyromegaly.      Vascular: No JVD.      Trachea: No tracheal deviation.   Cardiovascular:      Rate and Rhythm: Normal rate and regular rhythm.      Heart sounds: Normal heart sounds. No murmur heard.  Pulmonary:      Effort: Pulmonary effort is normal.      Breath sounds: Normal breath sounds. No wheezing.   Abdominal:      General: Bowel sounds are normal. There is no distension.      Palpations: Abdomen is soft.      Tenderness: There is no abdominal tenderness.   Musculoskeletal:         General: Normal range of motion.      Cervical back: Normal range of motion and neck supple.   Lymphadenopathy:      Cervical: No cervical adenopathy.   Skin:     General: Skin is warm and dry.      Findings: No rash.   Neurological:      Mental Status: She is alert and oriented to person, place, and time.      Coordination: Coordination normal.   Psychiatric:         Mood and Affect: Mood normal.         Behavior: Behavior normal.         Thought Content: Thought content normal.         Judgment: Judgment normal.       Assessment & Plan   Diagnoses and all orders for this visit:    1. Other fatigue (Primary)    2. Elevated liver enzymes  -     URBANO Fibrosure; Future    3. Sleep apnea, unspecified type    4. " Hyperlipidemia, unspecified hyperlipidemia type  -     atorvastatin (LIPITOR) 40 MG tablet; Take 1 tablet by mouth Daily.  Dispense: 90 tablet; Refill: 3    5. Restless legs syndrome (RLS)  -     rOPINIRole (REQUIP) 1 MG tablet; Take 1 tablet by mouth 3 (Three) Times a Day. Take third dose 1 hour before bedtime.  Dispense: 270 tablet; Refill: 3    6. Moderate episode of recurrent major depressive disorder  -     buPROPion XL (Wellbutrin XL) 150 MG 24 hr tablet; Take 1 tablet by mouth Daily.  Dispense: 90 tablet; Refill: 3    7. Essential hypertension  -     amLODIPine (NORVASC) 5 MG tablet; Take 1 tablet by mouth Daily.  Dispense: 90 tablet; Refill: 3    8. Gastroesophageal reflux disease, unspecified whether esophagitis present  -     pantoprazole (PROTONIX) 40 MG EC tablet; Take 1 tablet by mouth Daily.  Dispense: 90 tablet; Refill: 3    9. Vitamin D deficiency  -     vitamin D (ERGOCALCIFEROL) 1.25 MG (72996 UT) capsule capsule; Take 1 capsule by mouth 1 (One) Time Per Week.  Dispense: 15 capsule; Refill: 3    10. Moderate asthma without complication, unspecified whether persistent  -     albuterol sulfate  (90 Base) MCG/ACT inhaler; Inhale 2 puffs Every 6 (Six) Hours As Needed for Wheezing.  Dispense: 8.5 g; Refill: 5    Other orders  -     Discontinue: rOPINIRole (REQUIP) 1 MG tablet; Take 1 tablet by mouth 3 (Three) Times a Day. Take 1 hour before bedtime.  Dispense: 90 tablet; Refill: 5  -     Discontinue: vitamin D (ERGOCALCIFEROL) 1.25 MG (23016 UT) capsule capsule; Take 1 capsule by mouth 1 (One) Time Per Week.  Dispense: 5 capsule; Refill: 5  -     Discontinue: buPROPion XL (Wellbutrin XL) 150 MG 24 hr tablet; Take 1 tablet by mouth Daily.  Dispense: 30 tablet; Refill: 5  -     Discontinue: atorvastatin (LIPITOR) 40 MG tablet; Take 1 tablet by mouth Daily.  Dispense: 30 tablet; Refill: 5    Fatigue could be multifactorial, partly related to depression and other factors.    We will wean off the  Effexor and try Wellbutrin Venus like this may be a bit more energizing for her as far as depression.  If she has any increasing anxiety she will let me know if she has the BuSpar for that and will continue on it.    We will check a ferritin level because of restless legs, and increase the Requip dose.    Continue on a thing as it is and she is going to make some positive lifestyle changes and work on trying to lose weight.  If not successful her blood pressure does not come down to goal of 130/80 we will add another medication.    Continue vitamin D supplements    Continue Protonix for GERD    Continue with her albuterol inhaler, when needed.    Advised to take the Lipitor daily as prescribed but we will go ahead and increase to 40 mg as well.  We will also get a [facility] panel due to elevated liver enzymes and hyperlipidemia as I suspect hepatic steatosis.    Follow-up with the sleep medicine department and continue to use her CPAP.  I suspect this will help with her energy as well when she gets on a routine with this.          This document has been electronically signed by Siobhan Cervantes MD on July 27, 2023 12:53 CDT

## 2023-07-28 ENCOUNTER — HOSPITAL ENCOUNTER (OUTPATIENT)
Dept: ULTRASOUND IMAGING | Facility: HOSPITAL | Age: 58
Discharge: HOME | End: 2023-07-28
Payer: COMMERCIAL

## 2023-07-28 VITALS
WEIGHT: 154.76 LBS | SYSTOLIC BLOOD PRESSURE: 109 MMHG | OXYGEN SATURATION: 96 % | BODY MASS INDEX: 28.48 KG/M2 | RESPIRATION RATE: 20 BRPM | TEMPERATURE: 97 F | HEIGHT: 62 IN | DIASTOLIC BLOOD PRESSURE: 69 MMHG | HEART RATE: 78 BPM

## 2023-07-28 DIAGNOSIS — R92.8 ABNORMAL MAMMOGRAM OF LEFT BREAST: ICD-10-CM

## 2023-07-28 LAB — FERRITIN SERPL-MCNC: 84.2 NG/ML (ref 13–150)

## 2023-07-28 RX ORDER — DIAZEPAM 5 MG/1
5 TABLET ORAL ONCE
Status: COMPLETED | OUTPATIENT
Start: 2023-07-28 | End: 2023-07-28

## 2023-07-28 RX ORDER — LIDOCAINE HYDROCHLORIDE AND EPINEPHRINE 10; 10 MG/ML; UG/ML
20 INJECTION, SOLUTION INFILTRATION; PERINEURAL ONCE
Status: DISCONTINUED | OUTPATIENT
Start: 2023-07-28 | End: 2023-07-29 | Stop reason: HOSPADM

## 2023-07-28 RX ORDER — OXYCODONE HYDROCHLORIDE AND ACETAMINOPHEN 5; 325 MG/1; MG/1
1 TABLET ORAL ONCE
Status: COMPLETED | OUTPATIENT
Start: 2023-07-28 | End: 2023-07-28

## 2023-07-28 RX ADMIN — OXYCODONE HYDROCHLORIDE AND ACETAMINOPHEN 1 TABLET: 5; 325 TABLET ORAL at 06:36

## 2023-07-28 RX ADMIN — DIAZEPAM 5 MG: 5 TABLET ORAL at 06:36

## 2023-07-28 NOTE — INTERVAL H&P NOTE
H&P updated. The patient was examined and the following changes are noted:  Stopped Effexor and started Wellbutrin, started vitamin D, and increased dose of Requip

## 2023-07-31 LAB
A2 MACROGLOB SERPL-MCNC: 187 MG/DL (ref 110–276)
ALT SERPL W P-5'-P-CCNC: 31 IU/L (ref 0–40)
APO A-I SERPL-MCNC: 159 MG/DL (ref 116–209)
AST SERPL W P-5'-P-CCNC: 23 IU/L (ref 0–40)
BILIRUB SERPL-MCNC: 0.2 MG/DL (ref 0–1.2)
CHOLEST SERPL-MCNC: 294 MG/DL (ref 100–199)
FIBROSIS SCORING:: ABNORMAL
FIBROSIS STAGE SERPL QL: ABNORMAL
GGT SERPL-CCNC: 17 IU/L (ref 0–60)
GLUCOSE SERPL-MCNC: 107 MG/DL (ref 70–99)
HAPTOGLOB SERPL-MCNC: 156 MG/DL (ref 33–346)
LABORATORY COMMENT REPORT: ABNORMAL
LIVER FIBR SCORE SERPL CALC.FIBROSURE: 0.06 (ref 0–0.21)
LIVER STEATOSIS GRADE SERPL QL: ABNORMAL
LIVER STEATOSIS SCORE SERPL: 0.64 (ref 0–0.4)
NASH GRADE SERPL QL: ABNORMAL
NASH INTERPRETATION SERPL-IMP: ABNORMAL
NASH SCORE SERPL: 0.34 (ref 0–0.25)
NASH SCORING: ABNORMAL
STEATOSIS SCORING: ABNORMAL
TEST PERFORMANCE INFO SPEC: ABNORMAL
TEST PERFORMANCE INFO SPEC: ABNORMAL
TRIGL SERPL-MCNC: 318 MG/DL (ref 0–149)

## 2023-08-01 LAB — REF LAB TEST METHOD: NORMAL

## 2023-08-02 ENCOUNTER — PATIENT OUTREACH (OUTPATIENT)
Dept: ONCOLOGY | Facility: CLINIC | Age: 58
End: 2023-08-02
Payer: COMMERCIAL

## 2023-08-02 ENCOUNTER — OFFICE VISIT (OUTPATIENT)
Dept: SURGERY | Facility: CLINIC | Age: 58
End: 2023-08-02
Payer: COMMERCIAL

## 2023-08-02 VITALS
WEIGHT: 155.8 LBS | TEMPERATURE: 97.8 F | HEIGHT: 62 IN | BODY MASS INDEX: 28.67 KG/M2 | SYSTOLIC BLOOD PRESSURE: 130 MMHG | OXYGEN SATURATION: 97 % | DIASTOLIC BLOOD PRESSURE: 80 MMHG | HEART RATE: 94 BPM

## 2023-08-02 DIAGNOSIS — C50.412 MALIGNANT NEOPLASM OF UPPER-OUTER QUADRANT OF LEFT BREAST IN FEMALE, ESTROGEN RECEPTOR POSITIVE: Primary | ICD-10-CM

## 2023-08-02 DIAGNOSIS — Z17.0 MALIGNANT NEOPLASM OF UPPER-OUTER QUADRANT OF LEFT BREAST IN FEMALE, ESTROGEN RECEPTOR POSITIVE: Primary | ICD-10-CM

## 2023-08-02 PROCEDURE — 99214 OFFICE O/P EST MOD 30 MIN: CPT | Performed by: SURGERY

## 2023-08-07 ENCOUNTER — OFFICE VISIT (OUTPATIENT)
Dept: FAMILY MEDICINE CLINIC | Facility: CLINIC | Age: 58
End: 2023-08-07
Payer: COMMERCIAL

## 2023-08-07 ENCOUNTER — DOCUMENTATION (OUTPATIENT)
Dept: ONCOLOGY | Facility: CLINIC | Age: 58
End: 2023-08-07
Payer: COMMERCIAL

## 2023-08-07 ENCOUNTER — CONSULT (OUTPATIENT)
Dept: ONCOLOGY | Facility: CLINIC | Age: 58
End: 2023-08-07
Payer: COMMERCIAL

## 2023-08-07 VITALS
SYSTOLIC BLOOD PRESSURE: 145 MMHG | HEART RATE: 91 BPM | HEIGHT: 62 IN | WEIGHT: 155 LBS | OXYGEN SATURATION: 99 % | BODY MASS INDEX: 28.52 KG/M2 | DIASTOLIC BLOOD PRESSURE: 82 MMHG | RESPIRATION RATE: 18 BRPM

## 2023-08-07 VITALS
OXYGEN SATURATION: 99 % | WEIGHT: 157 LBS | TEMPERATURE: 97.1 F | SYSTOLIC BLOOD PRESSURE: 129 MMHG | HEIGHT: 62 IN | HEART RATE: 83 BPM | RESPIRATION RATE: 18 BRPM | DIASTOLIC BLOOD PRESSURE: 66 MMHG | BODY MASS INDEX: 28.89 KG/M2

## 2023-08-07 DIAGNOSIS — Z17.0 MALIGNANT NEOPLASM OF UPPER-OUTER QUADRANT OF LEFT BREAST IN FEMALE, ESTROGEN RECEPTOR POSITIVE: Primary | ICD-10-CM

## 2023-08-07 DIAGNOSIS — C50.412 MALIGNANT NEOPLASM OF UPPER-OUTER QUADRANT OF LEFT BREAST IN FEMALE, ESTROGEN RECEPTOR POSITIVE: Primary | ICD-10-CM

## 2023-08-07 DIAGNOSIS — F41.8 SITUATIONAL ANXIETY: Primary | ICD-10-CM

## 2023-08-07 PROCEDURE — 99213 OFFICE O/P EST LOW 20 MIN: CPT | Performed by: FAMILY MEDICINE

## 2023-08-07 PROCEDURE — G0463 HOSPITAL OUTPT CLINIC VISIT: HCPCS | Performed by: INTERNAL MEDICINE

## 2023-08-07 RX ORDER — LURASIDONE HYDROCHLORIDE 40 MG/1
40 TABLET, FILM COATED ORAL DAILY
Qty: 30 TABLET | Refills: 5 | Status: SHIPPED | OUTPATIENT
Start: 2023-08-07 | End: 2023-08-07

## 2023-08-07 RX ORDER — ALPRAZOLAM 1 MG/1
1 TABLET ORAL 2 TIMES DAILY PRN
Qty: 60 TABLET | Refills: 0 | Status: SHIPPED | OUTPATIENT
Start: 2023-08-07

## 2023-08-07 NOTE — PROGRESS NOTES
"Subjective   Anel Mullen is a 58 y.o. female.   Patient here today with concerns about some situational anxiety.  Started on Wellbutrin for depression and she feels so far this is doing okay but was recently given a diagnosis of breast cancer.  She is going to see the oncologist later this morning and met with the surgeon last week for the plan.  Surgery is anticipated as well as radiation.  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  She is just struggling right now with anxiety, understandably in anticipation of all of this and would like something to help keep her calm and just help her to sleep at night for the short-term.    History of Present Illness    The following portions of the patient's history were reviewed and updated as appropriate: allergies, current medications, past family history, past medical history, past social history, past surgical history, and problem list.    Review of Systems   Constitutional:  Positive for fatigue.   Musculoskeletal:  Positive for myalgias.   Psychiatric/Behavioral:  Positive for dysphoric mood.    All other systems reviewed and are negative.    Objective   Body mass index is 28.34 kg/mý.  Vitals:    08/07/23 0832   BP: 145/82   Pulse: 91   Resp: 18   SpO2: 99%   Weight: 70.3 kg (155 lb)   Height: 157.5 cm (62.01\")       Physical Exam  Vitals and nursing note reviewed.   Constitutional:       Appearance: She is well-developed.   HENT:      Head: Normocephalic and atraumatic.      Nose: Nose normal.   Eyes:      Conjunctiva/sclera: Conjunctivae normal.      Pupils: Pupils are equal, round, and reactive to light.   Neck:      Thyroid: No thyromegaly.      Vascular: No JVD.      Trachea: No tracheal deviation.   Cardiovascular:      Rate and Rhythm: Normal rate and regular rhythm.      Heart sounds: Normal heart sounds. No murmur heard.  Pulmonary:      Effort: Pulmonary effort is normal.      Breath " sounds: Normal breath sounds. No wheezing.   Abdominal:      General: Bowel sounds are normal. There is no distension.      Palpations: Abdomen is soft.      Tenderness: There is no abdominal tenderness.   Musculoskeletal:         General: Normal range of motion.      Cervical back: Normal range of motion and neck supple.   Lymphadenopathy:      Cervical: No cervical adenopathy.   Skin:     General: Skin is warm and dry.      Findings: No rash.   Neurological:      Mental Status: She is alert and oriented to person, place, and time.      Coordination: Coordination normal.   Psychiatric:         Behavior: Behavior normal.         Thought Content: Thought content normal.         Judgment: Judgment normal.      Comments: Appears anxious, crying     TISSUE EXAM, [facility]) [[id number]] (Order [id number])  Order  Date: 2023 Department: [facility] Released By: Syl Aragon (auto-released) Authorizing: Victoriano Sauer MD     Reprint Order Requisition    TISSUE EXAM, [facility]) (Order #[id number]) on 23        TISSUE EXAM, [facility])  Order: [id number]  Status: Final result       Visible to patient: Yes (seen)       Next appt: Today at 11:00 AM in Oncology (Robert Vincent MD)    Specimen Information: Tissue   0 Result Notes      Component 10 d ago   Reference Lab Report [facility]  [address] [place] [address]  Phone: [phone] or [phone]  Fax: [phone]  James Romero M.D., Medical Director    PATIENT NAME                                     LABORATORY NO.  [id number]   LAMIN ZHANG                               [id number]  [id number]                                 AGE                    SEX   SSN              CLIENT REF #  [facility]                   58        1965      F     [id number]      [id number]    [place]                               REQUESTING M.D.   "         ATTENDING M.D.         COPY TO.  [address] [place] [address]                         SONDRA ROD[place] [address]                     DATE COLLECTED            DATE RECEIVED          DATE REPORTED  07/28/2023 07/28/2023 08/01/2023    DIAGNOSIS:  BREAST, BIOPSY, NEEDLE CORES, LEFT:  Grade 2 invasive ductal carcinoma (7mm).  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  Estrogen receptor-strong positive, 100%.  Progesterone receptor-moderate to strong positive, 90%.  HER2/JOSEFINA- Negative, 0.    CLINICAL HISTORY:  Abnormal mammogram of left breast    SPECIMENS RECEIVED:  BREAST, BIOPSY, NEEDLE CORES, LEFT    MICROSCOPIC DESCRIPTION:  Levelland grading system:  Tubule formation-3  Nuclear pleomorphism-2  Mitoses-1  Total-grade 2    Biomarkers:  Estrogen Receptor; Strong, positive, 100%.  Progesterone receptor; moderate to strong positive, 90%.  HER2/JOSEFINA; Negative, 0.    The internal and external (both positive and negative) controls reacted  appropriately. Some of our immunohistochemical and in situ hybridization  studies are performed as analyte specific reagents. The following statement  applies to those tests: This test was developed, and its performance  characteristics determined by [facility]. It has not been  cleared or approved by the US Food and Drug Administration. However, the  FDA has determined that approval and clearance are not necessary.    Professional interpretation rendered by Jossue James M.D. at [facility],  [facility], [address] [place], [place] [id number].    GROSS DESCRIPTION:  Labeled as \"left breast\" (per requisition), consisting of multiple yellow breast  needle cores ranging in size from 0.3 x 0.2 x 0.1 cm to 1.9 x 0.3 x 0.3 cm.  Specimen is submitted entirely in cassettes A1-A2 with a total time in formalin  greater than 6 and less than 72 hours, cold ischemic time of 6 minutes.  " SOG    REVIEWED, DIAGNOSED AND ELECTRONICALLY  SIGNED BY:    Jossue James M.D.  CPT CODES:  49250, 88360x3, 74879          Assessment & Plan   Diagnoses and all orders for this visit:    1. Situational anxiety (Primary)  -     ALPRAZolam (XANAX) 1 MG tablet; Take 1 tablet by mouth 2 (Two) Times a Day As Needed for Anxiety.  Dispense: 60 tablet; Refill: 0    Continue on the Wellbutrin for depression.  In the short-term we will give alprazolam to take for anxiety.  I wrote the 1 mg tablets that she can take 1 at bedtime if she needs to help her sleep and she can break them in half in the daytime if needed for severe anxiety episodes.    If she needs something more long-term for sleep we will look into other options.  I will continue to follow her along with oncology and surgery    The patient has read and signed the [facility] Controlled Substance Contract.  I will continue to see patient for regular follow up appointments. Patient is well controlled on the medication.  KADY has been reviewed by me and is updated every 3 months. The patient is aware of the potential for addiction and dependence.             This document has been electronically signed by Siobhan Cervantes MD on August 7, 2023 09:21 CDT           Answers submitted by the patient for this visit:  Other (Submitted on 8/7/2023)  Please describe your symptoms.: Follow up from a breast biopsy.  Have you had these symptoms before?: No  How long have you been having these symptoms?: 5-7 days  Primary Reason for Visit (Submitted on 8/7/2023)  What is the primary reason for your visit?: Other

## 2023-08-07 NOTE — PROGRESS NOTES
REASON FOR CONSULTATION:  breast cancer   Provide an opinion on any further workup or treatment                             REQUESTING PHYSICIAN:  Victoriano Sauer MD     RECORDS OBTAINED:  Records of the patients history including those obtained from the referring provider were reviewed and summarized in detail.    History of Present Illness       This is a pleasant 58-year-old female who was seen in consultation at the request of Dr. Sauer for evaluation of newly diagnosed breast cancer.  Patient is a fair historian.  She was accompanied by her .  She had bilateral screening mammogram performed on July 7, 2023 showed developing asymmetry in left breast upper outer quadrant.  Subsequent evaluation with diagnostic mammogram and left breast ultrasound showed irregular hypodensity that measures 5 x 5 x 7 mm.She underwent core needle biopsy on July 28, 2023 which showed grade 2 invasive ductal carcinoma.  Intermediate grade DCIS was also identified.  Estrogen receptor was 100% positive, progesterone receptor 90% positive.  HER2 was negative at 0.  She is currently postmenopausal.  Has 3 children.  Did take birth control pills for about 13 to 14 years.  Did not tolerate hormone replacement therapy postmenopausal.  No family history of breast or ovarian cancer.  She has been referred to me for further evaluation and management of her breast cancer.      Past Medical History:   Diagnosis Date    Abnormal mammogram of left breast     Allergic rhinitis     Allergic rhinitis - Seasonal, particularly tree pollens, etc.       Carpal tunnel syndrome     Cystic acne     Cystic acne - Reassurance given; not cancer       Dupuytren's contracture of both hands 09/11/2018    Encounter for screening mammogram for malignant neoplasm of breast     Eustachian tube disorder     GERD (gastroesophageal reflux disease)     Hyperlipidemia     Changed Pravachol to Lipitor, 6/2014    Hypertension     Iron deficiency anemia     Iron  deficiency anemia - Discovered summer 2014. On oral iron replacement.       Mild intermittent asthma     Palpitations     Palpitations - Back to baseline. Unremarkable Holter March 2013       Plantar fasciitis     Plantar fasciitis - Left lower extremity. Recurrent. Controlled 5/2/2014       Pruritic rash     Pruritic rash - Apparently related to environmental allergens. Resolved 6/2014. Some recurrence on the right upper extremity, 1/2015       Restless legs syndrome (RLS) 09/11/2018    Seafood allergy 02/11/2019    Sleep apnea     Tendinitis     Tendinitis AND/OR tenosynovitis of the elbow region - Right           Past Surgical History:   Procedure Laterality Date    CARPAL TUNNEL RELEASE Right 02/17/2017    Dr. Malia [place], [place]    CARPAL TUNNEL RELEASE Left     LACERATION REPAIR Right     index  finger    TONSILLECTOMY      ULNAR TUNNEL RELEASE Right     WISDOM TOOTH EXTRACTION      4        Current Outpatient Medications on File Prior to Visit   Medication Sig Dispense Refill    albuterol (PROVENTIL) (2.5 MG/3ML) 0.083% nebulizer solution Take 2.5 mg by nebulization.      albuterol sulfate  (90 Base) MCG/ACT inhaler Inhale 2 puffs Every 6 (Six) Hours As Needed for Wheezing. 8.5 g 5    amLODIPine (NORVASC) 5 MG tablet Take 1 tablet by mouth Daily. 90 tablet 3    atorvastatin (LIPITOR) 40 MG tablet Take 1 tablet by mouth Daily. 90 tablet 3    Biotin 5000 MCG capsule Take  by mouth.      Budeson-Glycopyrrol-Formoterol (BREZTRI) 160-9-4.8 MCG/ACT aerosol inhaler Inhale 2 puffs 2 (Two) Times a Day.      buPROPion XL (Wellbutrin XL) 150 MG 24 hr tablet Take 1 tablet by mouth Daily. 90 tablet 3    Calcium Citrate-Vitamin D3 (CITRACAL) 315-6.25 MG-MCG tablet tablet Take 2 tablets by mouth Daily.      cetirizine (zyrTEC) 10 MG tablet Take 1 tablet by mouth Every Night. 90 tablet 3    ferrous gluconate 324 (37.5 Fe) MG tablet tablet Take  by mouth Daily With Breakfast.      fluticasone (FLONASE) 50 MCG/ACT  nasal spray 2 sprays into the nostril(s) as directed by provider Daily. 1 bottle 5    magnesium oxide (MAG-OX) 400 MG tablet Take 1 tablet by mouth Daily. With zinc      Nebulizer misc r05.1, r06.2, j45.21, r07.89      ondansetron ODT (ZOFRAN ODT) 4 MG disintegrating tablet Take 1 tablet by mouth Every 8 (Eight) Hours As Needed for Nausea or Vomiting. 21 tablet 0    pantoprazole (PROTONIX) 40 MG EC tablet Take 1 tablet by mouth Daily. 90 tablet 3    rOPINIRole (REQUIP) 1 MG tablet Take 1 tablet by mouth 3 (Three) Times a Day. Take third dose 1 hour before bedtime. 270 tablet 3    triamcinolone (KENALOG) 0.1 % cream Apply  topically 3 (Three) Times a Day As Needed for Rash. 75 g 5    vitamin D (ERGOCALCIFEROL) 1.25 MG (30069 UT) capsule capsule Take 1 capsule by mouth 1 (One) Time Per Week. 15 capsule 3     No current facility-administered medications on file prior to visit.        ALLERGIES:    Allergies   Allergen Reactions    Codeine Nausea And Vomiting     Abdominal pain    Bactrim [Sulfamethoxazole-Trimethoprim] Irritability    Shellfish-Derived Products Swelling     Gums swelling, abdominal pain, itching if touches skin    Sulfa Antibiotics Other (See Comments)     shaking    Meloxicam Rash        Social History     Socioeconomic History    Marital status:    Tobacco Use    Smoking status: Never     Passive exposure: Past    Smokeless tobacco: Never    Tobacco comments:     Passive as a child for 18 years   Vaping Use    Vaping Use: Never used   Substance and Sexual Activity    Alcohol use: Yes     Comment: occasionally    Drug use: Never    Sexual activity: Defer     Partners: Male     Birth control/protection: Post-menopausal        Family History   Problem Relation Age of Onset    Hypertension Mother     Diabetes Mother     Hyperlipidemia Mother     Osteoporosis Mother     Hypertension Father     Diabetes Father     Hyperlipidemia Father     Pancreatitis Father     Osteoarthritis Sister      Hyperlipidemia Sister     No Known Problems Son     Colon cancer Maternal Grandmother     Diabetes Maternal Grandfather     Heart disease Paternal Grandmother     No Known Problems Paternal Grandfather             Objective     Vitals:    08/07/23 1108   BP: 129/66   Pulse: 83   Resp: 18   Temp: 97.1 øF (36.2 øC)   SpO2: 99%              No data to display                Physical Exam  Vitals and nursing note reviewed.   Constitutional:       Appearance: Normal appearance.   Neurological:      General: No focal deficit present.      Mental Status: She is alert and oriented to person, place, and time. Mental status is at baseline.   Psychiatric:         Mood and Affect: Mood normal.         Behavior: Behavior normal.         Thought Content: Thought content normal.             RECENT LABS:Independently reviewed and summarized  Hematology WBC   Date Value Ref Range Status   07/11/2023 5.97 3.40 - 10.80 10*3/mm3 Final     RBC   Date Value Ref Range Status   07/11/2023 4.34 3.77 - 5.28 10*6/mm3 Final     Hemoglobin   Date Value Ref Range Status   07/11/2023 13.3 12.0 - 15.9 g/dL Final     Hematocrit   Date Value Ref Range Status   07/11/2023 39.8 34.0 - 46.6 % Final     Platelets   Date Value Ref Range Status   07/11/2023 244 140 - 450 10*3/mm3 Final          Lab Results   Component Value Date    GLUCOSE 110 (H) 07/12/2023    BUN 19 07/12/2023    CREATININE 0.91 07/12/2023    EGFR 74.2 07/12/2023    BCR 20.9 07/12/2023    K 4.0 07/12/2023    CO2 34.0 (H) 07/12/2023    CALCIUM 9.4 07/12/2023    ALBUMIN 4.1 07/12/2023    BILITOT 0.7 07/12/2023    AST 27 07/12/2023    ALT 36 (H) 07/12/2023             Imaging (independently reviewed and summarized):     Mammogram on July 7, 2023 showed developing asymmetry in left breast upper outer quadrant.  Subsequent evaluation with diagnostic mammogram and left breast ultrasound showed irregular hypodensity that measures 5 x 5 x 7 mm.      Pathology (result reviewed)        7/28/23      DIAGNOSIS:  BREAST, BIOPSY, NEEDLE CORES, LEFT:  Grade 2 invasive ductal carcinoma (7mm).  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  Estrogen receptor-strong positive, 100%.  Progesterone receptor-moderate to strong positive, 90%.  HER2/ESTEVAN- Negative, 0.     Anel Mullen reports a pain score of 0.  Given her pain assessment as noted, treatment options were discussed and the following options were decided upon as a follow-up plan to address the patient's pain: continuation of current treatment plan for pain.    Patient screened positive for depression based on a PHQ-9 score of 10 on 8/7/2023. Follow-up recommendations include: Elevated PHQ score reflective of acute illness, not depression, Referral to Social Work, and Suicide Risk Assessment performed.      Diagnosis:   (1) Left breast cancer     Assessment & Plan     New diagnosis for me.  Old medical records including Dr. Sauer consultation notes, biopsy report, mammogram report and pathology report reviewed at length.  Patient with screen detected 5 x 5 x 7 mm irregular hypodensity in the left breast concerning for breast cancer.  Biopsy showed grade 2 invasive ductal carcinoma with DCIS.  Tumor was 100% estrogen receptor positive, 90% progesterone receptor positive and HER2/estevan negative.    I had a very lengthy discussion with patient and her  about her diagnosis, prognosis and treatment options.  Likely she has stage I hormone positive, HER2 negative left breast cancer.  Various different surgical options including lumpectomy versus mastectomy discussed.  Discussed that adjuvant chemotherapy will depend upon the actual tumor size as well as lymph node status at the time of surgery.  She will likely also need Oncotype DX testing to determine the need for adjuvant chemotherapy.    Discussed that if she does undergo lumpectomy she will need adjuvant radiation therapy.  She is seeing radiation oncology tomorrow and  have encouraged her to discuss details about radiation therapy further with our radiation oncologist.    I will plan to see her after surgery to discuss adjuvant therapy options.  She will likely need tamoxifen or Arimidex however we will wait to discuss this options after definitive surgery is planned.    She was encouraged to call us with any questions or concerns in meanwhile.      Recommendations:   Discussed diagnosis, prognosis and treatment options at length.  Surgical options including lumpectomy versus mastectomy discussed at length.  No role for neoadjuvant chemotherapy given she is hormone positive and HER2 negative with small tumor size.  We will plan to see her after surgery and consider Oncotype DX testing to determine the need for adjuvant chemotherapy.  If she does decide to undergo lumpectomy she will need adjuvant radiation therapy.  Likely will need adjuvant hormonal therapy with tamoxifen or Arimidex.  We will discuss after surgery.    I spent 50 minutes caring for Anel on this date of service. This time includes time spent by me in the following activities: preparing for the visit, reviewing tests, obtaining and/or reviewing a separately obtained history, performing a medically appropriate examination and/or evaluation, counseling and educating the patient/family/caregiver, ordering medications, tests, or procedures, referring and communicating with other health care professionals, documenting information in the medical record, independently interpreting results and communicating that information with the patient/family/caregiver, and care coordination.

## 2023-08-08 ENCOUNTER — CONSULT (OUTPATIENT)
Dept: RADIATION ONCOLOGY | Facility: HOSPITAL | Age: 58
End: 2023-08-08
Payer: COMMERCIAL

## 2023-08-08 ENCOUNTER — DOCUMENTATION (OUTPATIENT)
Dept: NUTRITION | Facility: HOSPITAL | Age: 58
End: 2023-08-08
Payer: COMMERCIAL

## 2023-08-08 ENCOUNTER — HOSPITAL ENCOUNTER (OUTPATIENT)
Dept: RADIATION ONCOLOGY | Facility: HOSPITAL | Age: 58
Setting detail: RADIATION/ONCOLOGY SERIES
End: 2023-08-08
Payer: COMMERCIAL

## 2023-08-08 VITALS
OXYGEN SATURATION: 93 % | BODY MASS INDEX: 29.23 KG/M2 | WEIGHT: 159.8 LBS | TEMPERATURE: 97 F | DIASTOLIC BLOOD PRESSURE: 61 MMHG | HEART RATE: 91 BPM | SYSTOLIC BLOOD PRESSURE: 126 MMHG | RESPIRATION RATE: 18 BRPM

## 2023-08-08 DIAGNOSIS — C50.412 MALIGNANT NEOPLASM OF UPPER-OUTER QUADRANT OF LEFT BREAST IN FEMALE, ESTROGEN RECEPTOR POSITIVE: Primary | ICD-10-CM

## 2023-08-08 DIAGNOSIS — Z17.0 MALIGNANT NEOPLASM OF UPPER-OUTER QUADRANT OF LEFT BREAST IN FEMALE, ESTROGEN RECEPTOR POSITIVE: Primary | ICD-10-CM

## 2023-08-08 PROCEDURE — G0463 HOSPITAL OUTPT CLINIC VISIT: HCPCS | Performed by: STUDENT IN AN ORGANIZED HEALTH CARE EDUCATION/TRAINING PROGRAM

## 2023-08-08 NOTE — PROGRESS NOTES
Radiation Oncology Consult    Patient: Anel Mullen   YOB: 1965   Medical Record Number: [id number]   Date of Visit  August 8, 2023   Primary Diagnosis:  Cancer Staging   Malignant neoplasm of upper-outer quadrant of left breast in female, estrogen receptor positive  Staging form: Breast, AJCC 8th Edition  - Clinical stage from 8/7/2023: Stage IA (cT1b, cN0, cM0, G2, ER+, TN+, HER2-) - Signed by Yasir Raines MD on 8/7/2023        ASSESSMENT/PLAN:  Ms. Mullen is a 58 y.o. female with left upper outer breast invasive ductal carcinoma, grade 2, ER/TN+ HER2-, cT1b N0 M0. Personally reviewed available physician notes, imaging, and pathology. Reviewed NCCN guidelines and discussed treatment options.    Doing well today. Discussed the standard of care for early stage breast cancer which typically involves lumpectomy and SLNB followed by radiotherapy and hormone therapy. Dose and fractionation of radiotherapy will depend on findings at the time of surgery but typically ranges from 5-20 fractions. Discussed the logistics of radiotherapy. Will plan to see patient back after surgery to discuss in further detail.        Prior Treatment:  none    Implanted Devices: none    History of Present Illness:  Ms. Mullne is a 58 y.o. female with a left upper outer breast invasive ductal carcinoma, grade 2, ER/TN+ HER2-, cT1b N0 M0. Screening mammogram on 7/7/23 detected a spiculated ALEXANDRA asymmetry. Diagnostic ultrasound on 7/11/23 showed a 7 mm nodule in the left breast at 1:00. Biopsy on 7/28/23 showed grade 2 IDC, ER/TN+ HER2-. She was referred to discuss management.    She is doing fairly well today. No new pains or symptoms with her new diagnosis. No significant unintentional weight gain or loss. No headaches, chest pains, hemoptysis, hematuria, hematochezia. Some occasional stomach pains.    This is a new problem to me (Old medical records were requested, reviewed, and summarized in the HPI)    Review of  Systems    All other systems reviewed and are negative.    OB history:    First child at: 22  Menarche: 15  Menopause: 44  Breastfeeding: no  Hormone use: 1 year    Past Medical History:   Diagnosis Date    Abnormal mammogram of left breast     Allergic rhinitis     Allergic rhinitis - Seasonal, particularly tree pollens, etc.       Breast cancer 2023    Carpal tunnel syndrome     Cystic acne     Cystic acne - Reassurance given; not cancer       Dupuytren's contracture of both hands 2018    Encounter for screening mammogram for malignant neoplasm of breast     Eustachian tube disorder     GERD (gastroesophageal reflux disease)     Hyperlipidemia     Changed Pravachol to Lipitor, 2014    Hypertension     Iron deficiency anemia     Iron deficiency anemia - Discovered summer 2014. On oral iron replacement.       Mild intermittent asthma     Palpitations     Palpitations - Back to baseline. Unremarkable Holter 2013       Plantar fasciitis     Plantar fasciitis - Left lower extremity. Recurrent. Controlled 2014       Pruritic rash     Pruritic rash - Apparently related to environmental allergens. Resolved 2014. Some recurrence on the right upper extremity, 2015       Restless legs syndrome (RLS) 2018    Seafood allergy 2019    Sleep apnea     cpap    Tendinitis     Tendinitis AND/OR tenosynovitis of the elbow region - Right           Past Surgical History:   Procedure Laterality Date    BREAST BIOPSY Left 2023    CARPAL TUNNEL RELEASE Right 2017    Dr. Malia [place], [place]    CARPAL TUNNEL RELEASE Left     LACERATION REPAIR Right     index  finger    TONSILLECTOMY      ULNAR TUNNEL RELEASE Right     WISDOM TOOTH EXTRACTION      4      Family History   Problem Relation Age of Onset    Hypertension Mother     Diabetes Mother     Hyperlipidemia Mother     Osteoporosis Mother     Hypertension Father     Diabetes Father     Hyperlipidemia Father     Pancreatitis Father      Osteoarthritis Sister     Hyperlipidemia Sister     No Known Problems Son     Colon cancer Maternal Grandmother     Diabetes Maternal Grandfather     Heart disease Paternal Grandmother     No Known Problems Paternal Grandfather         Social History     Socioeconomic History    Marital status:      Spouse name: Anderson    Number of children: 3    Highest education level: Some college, no degree   Tobacco Use    Smoking status: Never     Passive exposure: Past    Smokeless tobacco: Never    Tobacco comments:     Passive as a child for 18 years   Vaping Use    Vaping Use: Never used   Substance and Sexual Activity    Alcohol use: Yes     Comment: occasionally    Drug use: Never    Sexual activity: Yes     Partners: Male     Birth control/protection: Post-menopausal     Comment: menopause @ age 44        Allergies:  Codeine, Bactrim [sulfamethoxazole-trimethoprim], Shellfish-derived products, Sulfa antibiotics, and Meloxicam   Prior to Admission medications    Medication Sig Start Date End Date Taking? Authorizing Provider   albuterol (PROVENTIL) (2.5 MG/3ML) 0.083% nebulizer solution Take 2.5 mg by nebulization. 4/9/23 4/9/24  Adalberto Griffiths MD   albuterol sulfate  (90 Base) MCG/ACT inhaler Inhale 2 puffs Every 6 (Six) Hours As Needed for Wheezing. 7/27/23   Siobhan Cervantes MD   ALPRAZolam (XANAX) 1 MG tablet Take 1 tablet by mouth 2 (Two) Times a Day As Needed for Anxiety. 8/7/23   Siobhan Cervantes MD   amLODIPine (NORVASC) 5 MG tablet Take 1 tablet by mouth Daily. 7/27/23   Siobhan Cervantes MD   atorvastatin (LIPITOR) 40 MG tablet Take 1 tablet by mouth Daily. 7/27/23   Siobhan Cervantes MD   Biotin 5000 MCG capsule Take  by mouth.    ProviderAdalberto MD   Budeson-Glycopyrrol-Formoterol (BREZTRI) 160-9-4.8 MCG/ACT aerosol inhaler Inhale 2 puffs 2 (Two) Times a Day.    Adalberto Griffitsh MD   buPROPion XL (Wellbutrin XL) 150 MG 24 hr tablet Take 1 tablet by mouth Daily. 7/27/23    Siobhan Cervantes MD   Calcium Citrate-Vitamin D3 (CITRACAL) 315-6.25 MG-MCG tablet tablet Take 2 tablets by mouth Daily.    ProviderAdalberto MD   cetirizine (zyrTEC) 10 MG tablet Take 1 tablet by mouth Every Night. 12/4/20   Rohan Bryan MD   ferrous gluconate 324 (37.5 Fe) MG tablet tablet Take  by mouth Daily With Breakfast.    Adalberto Griffiths MD   fluticasone (FLONASE) 50 MCG/ACT nasal spray 2 sprays into the nostril(s) as directed by provider Daily. 12/4/20   Rohan Bryan MD   magnesium oxide (MAG-OX) 400 MG tablet Take 1 tablet by mouth Daily. With zinc    Adalberto Griffiths MD   Nebulizer misc r05.1, r06.2, j45.21, r07.89 4/9/23   Adalberto Griffiths MD   ondansetron ODT (ZOFRAN ODT) 4 MG disintegrating tablet Take 1 tablet by mouth Every 8 (Eight) Hours As Needed for Nausea or Vomiting. 2/17/20   Araceli Galvez APRN   pantoprazole (PROTONIX) 40 MG EC tablet Take 1 tablet by mouth Daily. 7/27/23   Siobhan Cervantes MD   rOPINIRole (REQUIP) 1 MG tablet Take 1 tablet by mouth 3 (Three) Times a Day. Take third dose 1 hour before bedtime. 7/27/23   Siobhan Cervantes MD   triamcinolone (KENALOG) 0.1 % cream Apply  topically 3 (Three) Times a Day As Needed for Rash. 6/19/18   Rohan Bryan MD   vitamin D (ERGOCALCIFEROL) 1.25 MG (53987 UT) capsule capsule Take 1 capsule by mouth 1 (One) Time Per Week. 7/27/23   Siobhan Cervantes MD      Pain:(on a scale of 0-10)    Pain Score    08/08/23 1351   PainSc: 0-No pain      Anel Mullen reports a pain score of 0.  Given her pain assessment as noted, treatment options were discussed and the following options were decided upon as a follow-up plan to address the patient's pain: continuation of current treatment plan for pain.       Quality of Life:   ECOG: (0) Fully active, able to carry on all predisease performance without restriction    Advance Directives (For Healthcare)  Pre-existing AND/MOST/POLST Order: No  Advance Directive Status: Patient  does not have advance directive  Have you reviewed your Advance Directive and is it valid for this stay?: Not applicable  Literature Provided on Advance Directives: No  Patient Requests Assistance on Advance Directives: Patient Declined    PHQ-9 Depression Screening:  Little interest or pleasure in doing things? 1-->several days   Feeling down, depressed, or hopeless? 1-->several days   Trouble falling or staying asleep, or sleeping too much? 2-->more than half the days   Feeling tired or having little energy? 2-->more than half the days   Poor appetite or overeating? 0-->not at all   Feeling bad about yourself - or that you are a failure or have let yourself or your family down? 1-->several days   Trouble concentrating on things, such as reading the newspaper or watching television? 2-->more than half the days   Moving or speaking so slowly that other people could have noticed? Or the opposite - being so fidgety or restless that you have been moving around a lot more than usual? 0-->not at all   Thoughts that you would be better off dead, or of hurting yourself in some way? 0-->not at all   PHQ-9 Total Score 9   If you checked off any problems, how difficult have these problems made it for you to do your work, take care of things at home, or get along with other people? somewhat difficult    PHQ-9 Total Score: 9     Patient screened positive for depression based on a PHQ-9 score of 9 on 8/8/2023. Follow-up recommendations include: PCP managing depression.       Physical Examination:  Vitals:    08/08/23 1351   BP: 126/61   Pulse: 91   Resp: 18   Temp: 97 øF (36.1 øC)   SpO2: 93%     Wt Readings from Last 3 Encounters:   08/08/23 72.5 kg (159 lb 12.8 oz)   08/07/23 71.2 kg (157 lb)   08/07/23 70.3 kg (155 lb)     Body mass index is 29.23 kg/mý.    Constitutional: The patient is a well-developed, well-nourished female in no acute distress.  HEENT: Normocephalic, atraumatic. EOMI. Nose and ears without abnormalities  upon visual inspection.  Lymphatics: No cervical/supraclavicular/axillary lymphadenopathy is palpated bilaterally. No edema.  CV: Regular rate and rhythm. No murmurs/rubs/gallops/clicks.  Respiratory: Breathing comfortably on room air. Lungs clear to auscultation bilaterally.  Breasts: left breast biopsy site with steri-strips, no surrounding bruising or nodules. No suspicious lesions or nodules palpated. The right breast is within normal limits, with no suspicious lesions or nodules palpated. Female nurse chaperone was present for the exam.  GI: Abdomen soft, nontender, nondistended, with no hepatosplenomegaly or masses palpated. Bowel sounds normoactive.  Musculoskeletal: Spine and hips nontender to palpation. No clubbing or cyanosis.  Skin: No abnormal lesions noted on visible skin  Neurologic: Cranial nerves grossly intact, with no focal neurological deficits noted on exam.  Psychiatric: Alert and oriented. Normal affect, with no anxiety or depression noted.      Imaging:  US Breast Left Limited    Result Date: 2023  1.  Within the left breast in the 1:00 position, 7 cm from the nipple is an irregular hypoechoic nodule with vague margins.  Ultrasound-guided biopsy is warranted. 2.  ACR BI-RADS Category 4:  Suspicious abnormality.      Pathology:    [facility]  [address] [place] [address]  Phone: [phone] or [phone]  Fax: [phone]  James Romero M.D., Medical Director    PATIENT NAME                                     LABORATORY NO.  [id number]   LAMIN ZHANG                               [id number]  [id number]                                 AGE                    SEX   N              CLIENT REF #  [facility]                   58        1965      F     [id number]      [id number]    [place]                               REQUESTING M.D.           ATTENDING M.D.         COPY TO[address]                          "SONDRA RODRÍGUE[place] [address]                     DATE COLLECTED            DATE RECEIVED          DATE REPORTED  07/28/2023 07/28/2023 08/01/2023    DIAGNOSIS:  BREAST, BIOPSY, NEEDLE CORES, LEFT:  Grade 2 invasive ductal carcinoma (7mm).  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  Estrogen receptor-strong positive, 100%.  Progesterone receptor-moderate to strong positive, 90%.  HER2/JOSEFINA- Negative, 0.    CLINICAL HISTORY:  Abnormal mammogram of left breast    SPECIMENS RECEIVED:  BREAST, BIOPSY, NEEDLE CORES, LEFT    MICROSCOPIC DESCRIPTION:  Sterling grading system:  Tubule formation-3  Nuclear pleomorphism-2  Mitoses-1  Total-grade 2    Biomarkers:  Estrogen Receptor; Strong, positive, 100%.  Progesterone receptor; moderate to strong positive, 90%.  HER2/JOSEFINA; Negative, 0.    The internal and external (both positive and negative) controls reacted  appropriately. Some of our immunohistochemical and in situ hybridization  studies are performed as analyte specific reagents. The following statement  applies to those tests: This test was developed, and its performance  characteristics determined by [facility]. It has not been  cleared or approved by the US Food and Drug Administration. However, the  FDA has determined that approval and clearance are not necessary.    Professional interpretation rendered by Jossue James M.D. at [facility],  [facility], [address] [place] [address].    GROSS DESCRIPTION:  Labeled as \"left breast\" (per requisition), consisting of multiple yellow breast  needle cores ranging in size from 0.3 x 0.2 x 0.1 cm to 1.9 x 0.3 x 0.3 cm.  Specimen is submitted entirely in cassettes A1-A2 with a total time in formalin  greater than 6 and less than 72 hours, cold ischemic time of 6 minutes.  SOG    REVIEWED, DIAGNOSED AND ELECTRONICALLY  SIGNED BY:    Jossue James M.D.  CPT " CODES:  86808, 88360x3, 35088          Labs:   Lab Results   Component Value Date    GLUCOSE 110 (H) 07/12/2023    BUN 19 07/12/2023    CREATININE 0.91 07/12/2023    EGFRIFNONA 72 02/05/2019    BCR 20.9 07/12/2023    K 4.0 07/12/2023    CO2 34.0 (H) 07/12/2023    CALCIUM 9.4 07/12/2023    ALBUMIN 4.1 07/12/2023    AST 27 07/12/2023    ALT 36 (H) 07/12/2023       WBC   Date Value Ref Range Status   07/11/2023 5.97 3.40 - 10.80 10*3/mm3 Final     Hemoglobin   Date Value Ref Range Status   07/11/2023 13.3 12.0 - 15.9 g/dL Final     Hematocrit   Date Value Ref Range Status   07/11/2023 39.8 34.0 - 46.6 % Final     Platelets   Date Value Ref Range Status   07/11/2023 244 140 - 450 10*3/mm3 Final         Electronically signed by Yasir Raines MD 08/08/23 14:12 CDT

## 2023-08-08 NOTE — PROGRESS NOTES
Adult Outpatient Nutrition  Assessment    Patient Name:  Anel Mullen  YOB: 1965  MRN: [id number]    Assessment Date:  8/8/2023    CHART REVIEW  Anel Mullen   1965  58 y.o.  Wt: 157 lb  Ht: 62 in  Dx: breast cancer  Tx: surg in near future    Labs: alb 4.1    PATIENT/FAMILY COMMENTS  Usual Body Weight: 140 lb 10/1/2022  Weight Change: trend up  Diet: regular  Food Allergies: seafood  Number of Feedings Daily: 3  Appetite/Intake: good  Supplements: na  Meal Preparation:  + pt  Nutrition Concerns:  Recent intermit diarrhea (pt feels may be related to anxiety related to dx)  No problems chewing/swallowing/nausea/vomiting.      GOAL(s)  -to optimize nutrition in attempt to prevent loss of LBM      EDUCATION  Discussed  -importance of balanced nutrition/adequate protein (maria g post surg)  -importance of staying hydrated  -food safety    To reinforce education, written information with RD's name & number provided.  Patient and  very  receptive to suggestions--agreed to call on dietitian as needed.              Tia Gerard RD                          Electronically signed by:  Tia Gerard RD  08/08/23 16:06 CDT

## 2023-08-09 ENCOUNTER — OFFICE VISIT (OUTPATIENT)
Dept: SURGERY | Facility: CLINIC | Age: 58
End: 2023-08-09
Payer: COMMERCIAL

## 2023-08-09 VITALS
HEART RATE: 87 BPM | HEIGHT: 62 IN | OXYGEN SATURATION: 100 % | SYSTOLIC BLOOD PRESSURE: 100 MMHG | WEIGHT: 156.2 LBS | BODY MASS INDEX: 28.74 KG/M2 | TEMPERATURE: 97.7 F | DIASTOLIC BLOOD PRESSURE: 80 MMHG

## 2023-08-09 DIAGNOSIS — Z17.0 MALIGNANT NEOPLASM OF UPPER-OUTER QUADRANT OF LEFT BREAST IN FEMALE, ESTROGEN RECEPTOR POSITIVE: Primary | ICD-10-CM

## 2023-08-09 DIAGNOSIS — C50.412 MALIGNANT NEOPLASM OF UPPER-OUTER QUADRANT OF LEFT BREAST IN FEMALE, ESTROGEN RECEPTOR POSITIVE: Primary | ICD-10-CM

## 2023-08-09 PROCEDURE — 99214 OFFICE O/P EST MOD 30 MIN: CPT | Performed by: SURGERY

## 2023-08-09 RX ORDER — SODIUM CHLORIDE 9 MG/ML
40 INJECTION, SOLUTION INTRAVENOUS AS NEEDED
OUTPATIENT
Start: 2023-08-15

## 2023-08-09 RX ORDER — SODIUM CHLORIDE 0.9 % (FLUSH) 0.9 %
10 SYRINGE (ML) INJECTION EVERY 12 HOURS SCHEDULED
OUTPATIENT
Start: 2023-08-15

## 2023-08-09 RX ORDER — VENLAFAXINE 37.5 MG/1
37.5 TABLET ORAL 2 TIMES DAILY
COMMUNITY

## 2023-08-09 RX ORDER — SODIUM CHLORIDE 0.9 % (FLUSH) 0.9 %
10 SYRINGE (ML) INJECTION AS NEEDED
OUTPATIENT
Start: 2023-08-15

## 2023-08-09 RX ORDER — BUPIVACAINE HCL/0.9 % NACL/PF 0.1 %
2000 PLASTIC BAG, INJECTION (ML) EPIDURAL ONCE
OUTPATIENT
Start: 2023-08-15 | End: 2023-08-09

## 2023-08-09 NOTE — PROGRESS NOTES
Chief Complaint   Patient presents with    Follow-up     Schedule surgery        58-year-old female with recently diagnosed left breast cancer (stage Ia).  She has seen medical oncology and radiation oncology.  She has decided she will like to pursue lumpectomy with sentinel lymph node biopsy and subsequent whole breast radiation.  She denies any changes in symptoms since she was last seen.      Past Medical History:   Diagnosis Date    Abnormal mammogram of left breast     Allergic rhinitis     Allergic rhinitis - Seasonal, particularly tree pollens, etc.       Breast cancer 07/28/2023    Carpal tunnel syndrome     Cystic acne     Cystic acne - Reassurance given; not cancer       Dupuytren's contracture of both hands 09/11/2018    Encounter for screening mammogram for malignant neoplasm of breast     Eustachian tube disorder     GERD (gastroesophageal reflux disease)     Hyperlipidemia     Changed Pravachol to Lipitor, 6/2014    Hypertension     Iron deficiency anemia     Iron deficiency anemia - Discovered summer 2014. On oral iron replacement.       Mild intermittent asthma     Palpitations     Palpitations - Back to baseline. Unremarkable Holter March 2013       Plantar fasciitis     Plantar fasciitis - Left lower extremity. Recurrent. Controlled 5/2/2014       Pruritic rash     Pruritic rash - Apparently related to environmental allergens. Resolved 6/2014. Some recurrence on the right upper extremity, 1/2015       Restless legs syndrome (RLS) 09/11/2018    Seafood allergy 02/11/2019    Sleep apnea     cpap    Tendinitis     Tendinitis AND/OR tenosynovitis of the elbow region - Right          Past Surgical History:   Procedure Laterality Date    BREAST BIOPSY Left 07/28/2023    CARPAL TUNNEL RELEASE Right 02/17/2017    Dr. Malia [place]    CARPAL TUNNEL RELEASE Left     LACERATION REPAIR Right     index  finger    TONSILLECTOMY      ULNAR TUNNEL RELEASE Right     WISDOM TOOTH EXTRACTION      4          Current Outpatient Medications:     albuterol (PROVENTIL) (2.5 MG/3ML) 0.083% nebulizer solution, Take 2.5 mg by nebulization., Disp: , Rfl:     albuterol sulfate  (90 Base) MCG/ACT inhaler, Inhale 2 puffs Every 6 (Six) Hours As Needed for Wheezing., Disp: 8.5 g, Rfl: 5    ALPRAZolam (XANAX) 1 MG tablet, Take 1 tablet by mouth 2 (Two) Times a Day As Needed for Anxiety., Disp: 60 tablet, Rfl: 0    amLODIPine (NORVASC) 5 MG tablet, Take 1 tablet by mouth Daily., Disp: 90 tablet, Rfl: 3    atorvastatin (LIPITOR) 40 MG tablet, Take 1 tablet by mouth Daily., Disp: 90 tablet, Rfl: 3    Biotin 5000 MCG capsule, Take  by mouth Daily., Disp: , Rfl:     Budeson-Glycopyrrol-Formoterol (BREZTRI) 160-9-4.8 MCG/ACT aerosol inhaler, Inhale 2 puffs 2 (Two) Times a Day., Disp: , Rfl:     Calcium Citrate-Vitamin D3 (CITRACAL) 315-6.25 MG-MCG tablet tablet, Take 2 tablets by mouth Daily., Disp: , Rfl:     cetirizine (zyrTEC) 10 MG tablet, Take 1 tablet by mouth Every Night., Disp: 90 tablet, Rfl: 3    ferrous gluconate 324 (37.5 Fe) MG tablet tablet, Take  by mouth Daily With Breakfast., Disp: , Rfl:     fluticasone (FLONASE) 50 MCG/ACT nasal spray, 2 sprays into the nostril(s) as directed by provider Daily., Disp: 1 bottle, Rfl: 5    magnesium oxide (MAG-OX) 400 MG tablet, Take 1 tablet by mouth Daily. With zinc, Disp: , Rfl:     Nebulizer misc, , Disp: , Rfl:     ondansetron ODT (ZOFRAN ODT) 4 MG disintegrating tablet, Take 1 tablet by mouth Every 8 (Eight) Hours As Needed for Nausea or Vomiting., Disp: 21 tablet, Rfl: 0    pantoprazole (PROTONIX) 40 MG EC tablet, Take 1 tablet by mouth Daily., Disp: 90 tablet, Rfl: 3    rOPINIRole (REQUIP) 1 MG tablet, Take 1 tablet by mouth 3 (Three) Times a Day. Take third dose 1 hour before bedtime., Disp: 270 tablet, Rfl: 3    triamcinolone (KENALOG) 0.1 % cream, Apply  topically 3 (Three) Times a Day As Needed for Rash., Disp: 75 g, Rfl: 5    venlafaxine (EFFEXOR) 37.5 MG  tablet, Take 1 tablet by mouth 2 (Two) Times a Day., Disp: , Rfl:     vitamin D (ERGOCALCIFEROL) 1.25 MG (19096 UT) capsule capsule, Take 1 capsule by mouth 1 (One) Time Per Week., Disp: 15 capsule, Rfl: 3    Allergies   Allergen Reactions    Codeine Nausea And Vomiting     Abdominal pain    Bactrim [Sulfamethoxazole-Trimethoprim] Irritability    Shellfish-Derived Products Swelling     Gums swelling, abdominal pain, itching if touches skin    Sulfa Antibiotics Other (See Comments)     shaking    Meloxicam Rash       Immunization History   Administered Date(s) Administered    COVID-19 (COLLEEN) 01/31/2022    Hepatitis B Adult/Adolescent IM 08/25/2014, 09/25/2014    MMR 08/25/2014    Td (TDVAX) 03/23/1998    Tetanus 01/01/1998        Family History   Problem Relation Age of Onset    Hypertension Mother     Diabetes Mother     Hyperlipidemia Mother     Osteoporosis Mother     Hypertension Father     Diabetes Father     Hyperlipidemia Father     Pancreatitis Father     Osteoarthritis Sister     Hyperlipidemia Sister     No Known Problems Son     Colon cancer Maternal Grandmother     Diabetes Maternal Grandfather     Heart disease Paternal Grandmother     No Known Problems Paternal Grandfather        Social History     Socioeconomic History    Marital status:      Spouse name: Anderson    Number of children: 3    Highest education level: Some college, no degree   Tobacco Use    Smoking status: Never     Passive exposure: Past    Smokeless tobacco: Never    Tobacco comments:     Passive as a child for 18 years   Vaping Use    Vaping Use: Never used   Substance and Sexual Activity    Alcohol use: Yes     Comment: occasionally    Drug use: Never    Sexual activity: Defer     Partners: Male     Birth control/protection: Post-menopausal     Comment: menopause @ age 44       Review of Systems   Constitutional:  Positive for fatigue.   Musculoskeletal:  Positive for myalgias.   Psychiatric/Behavioral:  Positive for dysphoric  mood.    All other systems reviewed and are negative.    Vitals:    08/09/23 1020   BP: 100/80   Pulse: 87   Temp: 97.7 øF (36.5 øC)   SpO2: 100%       Physical Exam  Constitutional:       Appearance: Normal appearance.   HENT:      Head: Normocephalic and atraumatic.   Cardiovascular:      Rate and Rhythm: Normal rate and regular rhythm.   Pulmonary:      Effort: Pulmonary effort is normal. No respiratory distress.   Neurological:      General: No focal deficit present.      Mental Status: She is alert and oriented to person, place, and time.   Psychiatric:         Mood and Affect: Mood normal.         Behavior: Behavior normal.         ASSESSMENT    Diagnoses and all orders for this visit:    1. Malignant neoplasm of upper-outer quadrant of left breast in female, estrogen receptor positive (Primary)  -     Case Request; Standing  -     sodium chloride 0.9 % flush 10 mL  -     sodium chloride 0.9 % flush 10 mL  -     sodium chloride 0.9 % infusion 40 mL  -     ceFAZolin (ANCEF) 2,000 mg in sodium chloride 0.9 % 100 mL IVPB  -     Mammo Breast Placement Device Initial Without Biopsy; Future  -     NM [place] Node Injection Only; Future  -     MAMMO BREAST SPECIMEN; Future  -     Case Request    Other orders  -     Follow Anesthesia Guidelines / Protocol; Standing  -     Obtain Informed Consent; Standing  -     Place Sequential Compression Device in Pre-Op; Standing  -     Do Not Place IV or Blood Pressure Cuff on Affected Side of Patient With History of Breast Cancer; Standing  -     Contact Surgeon With Questions or Concerns; Standing  -     Verify / Perform Chlorhexidine Skin Prep; Standing  -     Verify NPO Status; Standing  -     Insert Peripheral IV; Standing  -     Saline Lock & Maintain IV Access; Standing        PLAN    Stage Ia ([id number], ER+/NH+/Her2 -, grade 2) invasive ductal carcinoma.  We discussed left lumpectomy with sentinel lymph node biopsy.  I counseled the patient on the risks of procedure  including bleeding, infection, and need for additional margins.  She expressed understanding and wishes to proceed with surgery.              This document has been electronically signed by Victoriano Sauer MD on August 9, 2023 10:38 CDT

## 2023-08-09 NOTE — H&P (VIEW-ONLY)
Chief Complaint   Patient presents with    Follow-up     Schedule surgery        58-year-old female with recently diagnosed left breast cancer (stage Ia).  She has seen medical oncology and radiation oncology.  She has decided she will like to pursue lumpectomy with sentinel lymph node biopsy and subsequent whole breast radiation.  She denies any changes in symptoms since she was last seen.      Past Medical History:   Diagnosis Date    Abnormal mammogram of left breast     Allergic rhinitis     Allergic rhinitis - Seasonal, particularly tree pollens, etc.       Breast cancer 07/28/2023    Carpal tunnel syndrome     Cystic acne     Cystic acne - Reassurance given; not cancer       Dupuytren's contracture of both hands 09/11/2018    Encounter for screening mammogram for malignant neoplasm of breast     Eustachian tube disorder     GERD (gastroesophageal reflux disease)     Hyperlipidemia     Changed Pravachol to Lipitor, 6/2014    Hypertension     Iron deficiency anemia     Iron deficiency anemia - Discovered summer 2014. On oral iron replacement.       Mild intermittent asthma     Palpitations     Palpitations - Back to baseline. Unremarkable Holter March 2013       Plantar fasciitis     Plantar fasciitis - Left lower extremity. Recurrent. Controlled 5/2/2014       Pruritic rash     Pruritic rash - Apparently related to environmental allergens. Resolved 6/2014. Some recurrence on the right upper extremity, 1/2015       Restless legs syndrome (RLS) 09/11/2018    Seafood allergy 02/11/2019    Sleep apnea     cpap    Tendinitis     Tendinitis AND/OR tenosynovitis of the elbow region - Right          Past Surgical History:   Procedure Laterality Date    BREAST BIOPSY Left 07/28/2023    CARPAL TUNNEL RELEASE Right 02/17/2017    Dr. Malia [place], [place]    CARPAL TUNNEL RELEASE Left     LACERATION REPAIR Right     index  finger    TONSILLECTOMY      ULNAR TUNNEL RELEASE Right     WISDOM TOOTH EXTRACTION      4          Current Outpatient Medications:     albuterol (PROVENTIL) (2.5 MG/3ML) 0.083% nebulizer solution, Take 2.5 mg by nebulization., Disp: , Rfl:     albuterol sulfate  (90 Base) MCG/ACT inhaler, Inhale 2 puffs Every 6 (Six) Hours As Needed for Wheezing., Disp: 8.5 g, Rfl: 5    ALPRAZolam (XANAX) 1 MG tablet, Take 1 tablet by mouth 2 (Two) Times a Day As Needed for Anxiety., Disp: 60 tablet, Rfl: 0    amLODIPine (NORVASC) 5 MG tablet, Take 1 tablet by mouth Daily., Disp: 90 tablet, Rfl: 3    atorvastatin (LIPITOR) 40 MG tablet, Take 1 tablet by mouth Daily., Disp: 90 tablet, Rfl: 3    Biotin 5000 MCG capsule, Take  by mouth Daily., Disp: , Rfl:     Budeson-Glycopyrrol-Formoterol (BREZTRI) 160-9-4.8 MCG/ACT aerosol inhaler, Inhale 2 puffs 2 (Two) Times a Day., Disp: , Rfl:     Calcium Citrate-Vitamin D3 (CITRACAL) 315-6.25 MG-MCG tablet tablet, Take 2 tablets by mouth Daily., Disp: , Rfl:     cetirizine (zyrTEC) 10 MG tablet, Take 1 tablet by mouth Every Night., Disp: 90 tablet, Rfl: 3    ferrous gluconate 324 (37.5 Fe) MG tablet tablet, Take  by mouth Daily With Breakfast., Disp: , Rfl:     fluticasone (FLONASE) 50 MCG/ACT nasal spray, 2 sprays into the nostril(s) as directed by provider Daily., Disp: 1 bottle, Rfl: 5    magnesium oxide (MAG-OX) 400 MG tablet, Take 1 tablet by mouth Daily. With zinc, Disp: , Rfl:     Nebulizer misc, , Disp: , Rfl:     ondansetron ODT (ZOFRAN ODT) 4 MG disintegrating tablet, Take 1 tablet by mouth Every 8 (Eight) Hours As Needed for Nausea or Vomiting., Disp: 21 tablet, Rfl: 0    pantoprazole (PROTONIX) 40 MG EC tablet, Take 1 tablet by mouth Daily., Disp: 90 tablet, Rfl: 3    rOPINIRole (REQUIP) 1 MG tablet, Take 1 tablet by mouth 3 (Three) Times a Day. Take third dose 1 hour before bedtime., Disp: 270 tablet, Rfl: 3    triamcinolone (KENALOG) 0.1 % cream, Apply  topically 3 (Three) Times a Day As Needed for Rash., Disp: 75 g, Rfl: 5    venlafaxine (EFFEXOR) 37.5 MG  tablet, Take 1 tablet by mouth 2 (Two) Times a Day., Disp: , Rfl:     vitamin D (ERGOCALCIFEROL) 1.25 MG (71681 UT) capsule capsule, Take 1 capsule by mouth 1 (One) Time Per Week., Disp: 15 capsule, Rfl: 3    Allergies   Allergen Reactions    Codeine Nausea And Vomiting     Abdominal pain    Bactrim [Sulfamethoxazole-Trimethoprim] Irritability    Shellfish-Derived Products Swelling     Gums swelling, abdominal pain, itching if touches skin    Sulfa Antibiotics Other (See Comments)     shaking    Meloxicam Rash       Immunization History   Administered Date(s) Administered    COVID-19 (COLLEEN) 01/31/2022    Hepatitis B Adult/Adolescent IM 08/25/2014, 09/25/2014    MMR 08/25/2014    Td (TDVAX) 03/23/1998    Tetanus 01/01/1998        Family History   Problem Relation Age of Onset    Hypertension Mother     Diabetes Mother     Hyperlipidemia Mother     Osteoporosis Mother     Hypertension Father     Diabetes Father     Hyperlipidemia Father     Pancreatitis Father     Osteoarthritis Sister     Hyperlipidemia Sister     No Known Problems Son     Colon cancer Maternal Grandmother     Diabetes Maternal Grandfather     Heart disease Paternal Grandmother     No Known Problems Paternal Grandfather        Social History     Socioeconomic History    Marital status:      Spouse name: Anderson    Number of children: 3    Highest education level: Some college, no degree   Tobacco Use    Smoking status: Never     Passive exposure: Past    Smokeless tobacco: Never    Tobacco comments:     Passive as a child for 18 years   Vaping Use    Vaping Use: Never used   Substance and Sexual Activity    Alcohol use: Yes     Comment: occasionally    Drug use: Never    Sexual activity: Defer     Partners: Male     Birth control/protection: Post-menopausal     Comment: menopause @ age 44       Review of Systems   Constitutional:  Positive for fatigue.   Musculoskeletal:  Positive for myalgias.   Psychiatric/Behavioral:  Positive for dysphoric  mood.    All other systems reviewed and are negative.    Vitals:    08/09/23 1020   BP: 100/80   Pulse: 87   Temp: 97.7 øF (36.5 øC)   SpO2: 100%       Physical Exam  Constitutional:       Appearance: Normal appearance.   HENT:      Head: Normocephalic and atraumatic.   Cardiovascular:      Rate and Rhythm: Normal rate and regular rhythm.   Pulmonary:      Effort: Pulmonary effort is normal. No respiratory distress.   Neurological:      General: No focal deficit present.      Mental Status: She is alert and oriented to person, place, and time.   Psychiatric:         Mood and Affect: Mood normal.         Behavior: Behavior normal.         ASSESSMENT    Diagnoses and all orders for this visit:    1. Malignant neoplasm of upper-outer quadrant of left breast in female, estrogen receptor positive (Primary)  -     Case Request; Standing  -     sodium chloride 0.9 % flush 10 mL  -     sodium chloride 0.9 % flush 10 mL  -     sodium chloride 0.9 % infusion 40 mL  -     ceFAZolin (ANCEF) 2,000 mg in sodium chloride 0.9 % 100 mL IVPB  -     Mammo Breast Placement Device Initial Without Biopsy; Future  -     NM [place] Node Injection Only; Future  -     MAMMO BREAST SPECIMEN; Future  -     Case Request    Other orders  -     Follow Anesthesia Guidelines / Protocol; Standing  -     Obtain Informed Consent; Standing  -     Place Sequential Compression Device in Pre-Op; Standing  -     Do Not Place IV or Blood Pressure Cuff on Affected Side of Patient With History of Breast Cancer; Standing  -     Contact Surgeon With Questions or Concerns; Standing  -     Verify / Perform Chlorhexidine Skin Prep; Standing  -     Verify NPO Status; Standing  -     Insert Peripheral IV; Standing  -     Saline Lock & Maintain IV Access; Standing        PLAN    Stage Ia (C1uA2M3, ER+/TX+/Her2 -, grade 2) invasive ductal carcinoma.  We discussed left lumpectomy with sentinel lymph node biopsy.  I counseled the patient on the risks of procedure  including bleeding, infection, and need for additional margins.  She expressed understanding and wishes to proceed with surgery.              This document has been electronically signed by Victoriano Sauer MD on August 9, 2023 10:38 CDT

## 2023-08-10 NOTE — PROGRESS NOTES
"..Distress Screening Follow-up  Oncology SW met face to face with patient on the date of her medical oncology consultation.  8-7-23.  She is accompanied by her  who presents attentive, supportive and offers collaborative feedback.  LCSW introduced self and explained role and support to include scope of clinical practice in oncology setting.      Diagnosis: Pt referred for evaluation and recommendations having newly been diagnosed with breast cancer.  Pt. Contemplating surgical options and treatment recommendations. She has met Anuja Weiner RN , Nurse navigator and aware of her support. Pt has with her \"breast Cancer Treatment Handbook\".       Location of Distress Screening: Medical oncology  8-7-23   Radiation oncology 8-8-12     Distress score:   10    Physical Concerns: Addressed by physician.      Pain: Y  Sleep: Y    Practical Problems: Pt. describes adequate resources to include home, transportation, insurance, food security and positive support system.    No barriers to care reported or identified.       Emotional Concerns:  Pt presents alert and oriented this visit. She presents situationally tearful and anxious, affect is congruent with the stress and adjustments she is experiencing with a very new cancer diagnosis and degree of information presented as she makes her treatment decisions.   Pt. Describes a good support network. She is employed and works [employer] in [place] , in the lab.   Pt. States she is prescribed antidepressant and saw her PCP yesterday and prescribed  Xanax for situation anxiety. Short term.  She is prescribed Wellbutrin for depression.   Pt articulates feelings well and was openly supported with validation of emotions as they are congruent with her situation.  SW offered ongoing availability and educated as to role and support of LCSW also offering to refer if more intense support is warranted.  Pt stats understanding.     Family Concerns: None.  with good family support per " pt .            Interventions: LCSW offered person centered therapeutic feedback by means of collecting history, emotional support, active listening, validation of emotions and clarification of feedback and recommendations for care.  Provided education related to oncology supports and services.      8-8-23. Pt was seen by radiation oncologist. SW did not see pt this day  8-10-23. Pt called SW to arrange time to speak before her scheduled surgery  8-15-23.    Emotional Needs: emotional suppport/coping strategies; outpatient mental health (offered LCSW counseling support. pt returned call for apt 8-14-23.)

## 2023-08-11 ENCOUNTER — PRE-ADMISSION TESTING (OUTPATIENT)
Dept: PREADMISSION TESTING | Facility: HOSPITAL | Age: 58
End: 2023-08-11
Payer: COMMERCIAL

## 2023-08-11 PROCEDURE — 93005 ELECTROCARDIOGRAM TRACING: CPT

## 2023-08-11 RX ORDER — SODIUM CHLORIDE, SODIUM GLUCONATE, SODIUM ACETATE, POTASSIUM CHLORIDE AND MAGNESIUM CHLORIDE 526; 502; 368; 37; 30 MG/100ML; MG/100ML; MG/100ML; MG/100ML; MG/100ML
1000 INJECTION, SOLUTION INTRAVENOUS CONTINUOUS PRN
Status: CANCELLED | OUTPATIENT
Start: 2023-08-15

## 2023-08-12 LAB
QT INTERVAL: 376 MS
QTC INTERVAL: 428 MS

## 2023-08-14 ENCOUNTER — ANESTHESIA EVENT (OUTPATIENT)
Dept: PERIOP | Facility: HOSPITAL | Age: 58
End: 2023-08-14
Payer: COMMERCIAL

## 2023-08-14 ENCOUNTER — DOCUMENTATION (OUTPATIENT)
Dept: ONCOLOGY | Facility: CLINIC | Age: 58
End: 2023-08-14
Payer: COMMERCIAL

## 2023-08-15 ENCOUNTER — HOSPITAL ENCOUNTER (OUTPATIENT)
Facility: HOSPITAL | Age: 58
Setting detail: HOSPITAL OUTPATIENT SURGERY
Discharge: HOME | End: 2023-08-15
Attending: SURGERY | Admitting: SURGERY
Payer: COMMERCIAL

## 2023-08-15 ENCOUNTER — HOSPITAL ENCOUNTER (OUTPATIENT)
Dept: NUCLEAR MEDICINE | Facility: HOSPITAL | Age: 58
Discharge: HOME | End: 2023-08-15
Payer: COMMERCIAL

## 2023-08-15 ENCOUNTER — ANESTHESIA (OUTPATIENT)
Dept: PERIOP | Facility: HOSPITAL | Age: 58
End: 2023-08-15
Payer: COMMERCIAL

## 2023-08-15 VITALS
BODY MASS INDEX: 28.44 KG/M2 | HEIGHT: 62 IN | TEMPERATURE: 98.3 F | OXYGEN SATURATION: 94 % | HEART RATE: 81 BPM | DIASTOLIC BLOOD PRESSURE: 65 MMHG | WEIGHT: 154.54 LBS | SYSTOLIC BLOOD PRESSURE: 113 MMHG | RESPIRATION RATE: 18 BRPM

## 2023-08-15 DIAGNOSIS — C50.412 MALIGNANT NEOPLASM OF UPPER-OUTER QUADRANT OF LEFT BREAST IN FEMALE, ESTROGEN RECEPTOR POSITIVE: ICD-10-CM

## 2023-08-15 DIAGNOSIS — Z17.0 MALIGNANT NEOPLASM OF UPPER-OUTER QUADRANT OF LEFT BREAST IN FEMALE, ESTROGEN RECEPTOR POSITIVE: ICD-10-CM

## 2023-08-15 PROCEDURE — 0 TECHNETIUM FILTERED SULFUR COLLOID: Performed by: SURGERY

## 2023-08-15 PROCEDURE — 25010000002 FENTANYL CITRATE (PF) 100 MCG/2ML SOLUTION: Performed by: NURSE ANESTHETIST, CERTIFIED REGISTERED

## 2023-08-15 PROCEDURE — 25010000002 PROPOFOL 200 MG/20ML EMULSION: Performed by: NURSE ANESTHETIST, CERTIFIED REGISTERED

## 2023-08-15 PROCEDURE — 25010000002 CEFAZOLIN PER 500 MG: Performed by: SURGERY

## 2023-08-15 PROCEDURE — 38900 IO MAP OF SENT LYMPH NODE: CPT | Performed by: SURGERY

## 2023-08-15 PROCEDURE — A9541 TC99M SULFUR COLLOID: HCPCS | Performed by: SURGERY

## 2023-08-15 PROCEDURE — 25010000002 MIDAZOLAM PER 1 MG: Performed by: NURSE ANESTHETIST, CERTIFIED REGISTERED

## 2023-08-15 PROCEDURE — 25010000002 ISOSULFAN BLUE 1 % SOLUTION: Performed by: SURGERY

## 2023-08-15 PROCEDURE — 25010000002 DEXAMETHASONE PER 1 MG: Performed by: NURSE ANESTHETIST, CERTIFIED REGISTERED

## 2023-08-15 PROCEDURE — 19301 PARTIAL MASTECTOMY: CPT | Performed by: SURGERY

## 2023-08-15 PROCEDURE — 38792 RA TRACER ID OF SENTINL NODE: CPT

## 2023-08-15 PROCEDURE — 38900 IO MAP OF SENT LYMPH NODE: CPT | Performed by: SPECIALIST/TECHNOLOGIST, OTHER

## 2023-08-15 PROCEDURE — 38525 BIOPSY/REMOVAL LYMPH NODES: CPT | Performed by: SURGERY

## 2023-08-15 PROCEDURE — 25010000002 PHENYLEPHRINE 10 MG/ML SOLUTION: Performed by: NURSE ANESTHETIST, CERTIFIED REGISTERED

## 2023-08-15 PROCEDURE — 19301 PARTIAL MASTECTOMY: CPT | Performed by: SPECIALIST/TECHNOLOGIST, OTHER

## 2023-08-15 RX ORDER — SODIUM CHLORIDE, SODIUM GLUCONATE, SODIUM ACETATE, POTASSIUM CHLORIDE AND MAGNESIUM CHLORIDE 526; 502; 368; 37; 30 MG/100ML; MG/100ML; MG/100ML; MG/100ML; MG/100ML
1000 INJECTION, SOLUTION INTRAVENOUS CONTINUOUS PRN
Status: DISCONTINUED | OUTPATIENT
Start: 2023-08-15 | End: 2023-08-15 | Stop reason: HOSPADM

## 2023-08-15 RX ORDER — FENTANYL CITRATE 50 UG/ML
INJECTION, SOLUTION INTRAMUSCULAR; INTRAVENOUS AS NEEDED
Status: DISCONTINUED | OUTPATIENT
Start: 2023-08-15 | End: 2023-08-15 | Stop reason: SURG

## 2023-08-15 RX ORDER — SODIUM CHLORIDE 9 MG/ML
40 INJECTION, SOLUTION INTRAVENOUS AS NEEDED
Status: DISCONTINUED | OUTPATIENT
Start: 2023-08-15 | End: 2023-08-15 | Stop reason: HOSPADM

## 2023-08-15 RX ORDER — LABETALOL HYDROCHLORIDE 5 MG/ML
5 INJECTION, SOLUTION INTRAVENOUS
Status: DISCONTINUED | OUTPATIENT
Start: 2023-08-15 | End: 2023-08-15 | Stop reason: HOSPADM

## 2023-08-15 RX ORDER — EPHEDRINE SULFATE 50 MG/ML
5 INJECTION, SOLUTION INTRAVENOUS ONCE AS NEEDED
Status: DISCONTINUED | OUTPATIENT
Start: 2023-08-15 | End: 2023-08-15 | Stop reason: HOSPADM

## 2023-08-15 RX ORDER — ONDANSETRON 2 MG/ML
4 INJECTION INTRAMUSCULAR; INTRAVENOUS ONCE AS NEEDED
Status: DISCONTINUED | OUTPATIENT
Start: 2023-08-15 | End: 2023-08-15 | Stop reason: HOSPADM

## 2023-08-15 RX ORDER — ACETAMINOPHEN 650 MG/1
650 SUPPOSITORY RECTAL ONCE AS NEEDED
Status: DISCONTINUED | OUTPATIENT
Start: 2023-08-15 | End: 2023-08-15 | Stop reason: HOSPADM

## 2023-08-15 RX ORDER — NALOXONE HYDROCHLORIDE 4 MG/.1ML
SPRAY NASAL
Qty: 2 EACH | Refills: 0 | Status: SHIPPED | OUTPATIENT
Start: 2023-08-15

## 2023-08-15 RX ORDER — ISOSULFAN BLUE 50 MG/5ML
INJECTION, SOLUTION SUBCUTANEOUS AS NEEDED
Status: DISCONTINUED | OUTPATIENT
Start: 2023-08-15 | End: 2023-08-15 | Stop reason: HOSPADM

## 2023-08-15 RX ORDER — SODIUM CHLORIDE 0.9 % (FLUSH) 0.9 %
10 SYRINGE (ML) INJECTION AS NEEDED
Status: DISCONTINUED | OUTPATIENT
Start: 2023-08-15 | End: 2023-08-15 | Stop reason: HOSPADM

## 2023-08-15 RX ORDER — BUPIVACAINE HYDROCHLORIDE AND EPINEPHRINE 2.5; 5 MG/ML; UG/ML
INJECTION, SOLUTION EPIDURAL; INFILTRATION; INTRACAUDAL; PERINEURAL AS NEEDED
Status: DISCONTINUED | OUTPATIENT
Start: 2023-08-15 | End: 2023-08-15 | Stop reason: HOSPADM

## 2023-08-15 RX ORDER — PROPOFOL 10 MG/ML
INJECTION, EMULSION INTRAVENOUS AS NEEDED
Status: DISCONTINUED | OUTPATIENT
Start: 2023-08-15 | End: 2023-08-15 | Stop reason: SURG

## 2023-08-15 RX ORDER — DIPHENHYDRAMINE HCL 25 MG
25 CAPSULE ORAL
Status: DISCONTINUED | OUTPATIENT
Start: 2023-08-15 | End: 2023-08-15 | Stop reason: HOSPADM

## 2023-08-15 RX ORDER — DEXAMETHASONE SODIUM PHOSPHATE 4 MG/ML
INJECTION, SOLUTION INTRA-ARTICULAR; INTRALESIONAL; INTRAMUSCULAR; INTRAVENOUS; SOFT TISSUE AS NEEDED
Status: DISCONTINUED | OUTPATIENT
Start: 2023-08-15 | End: 2023-08-15 | Stop reason: SURG

## 2023-08-15 RX ORDER — IPRATROPIUM BROMIDE AND ALBUTEROL SULFATE 2.5; .5 MG/3ML; MG/3ML
3 SOLUTION RESPIRATORY (INHALATION) ONCE AS NEEDED
Status: DISCONTINUED | OUTPATIENT
Start: 2023-08-15 | End: 2023-08-15 | Stop reason: HOSPADM

## 2023-08-15 RX ORDER — MIDAZOLAM HYDROCHLORIDE 1 MG/ML
INJECTION INTRAMUSCULAR; INTRAVENOUS AS NEEDED
Status: DISCONTINUED | OUTPATIENT
Start: 2023-08-15 | End: 2023-08-15 | Stop reason: SURG

## 2023-08-15 RX ORDER — LIDOCAINE HYDROCHLORIDE 20 MG/ML
INJECTION, SOLUTION EPIDURAL; INFILTRATION; INTRACAUDAL; PERINEURAL AS NEEDED
Status: DISCONTINUED | OUTPATIENT
Start: 2023-08-15 | End: 2023-08-15 | Stop reason: SURG

## 2023-08-15 RX ORDER — BUPIVACAINE HCL/0.9 % NACL/PF 0.1 %
2000 PLASTIC BAG, INJECTION (ML) EPIDURAL ONCE
Status: COMPLETED | OUTPATIENT
Start: 2023-08-15 | End: 2023-08-15

## 2023-08-15 RX ORDER — SODIUM CHLORIDE 0.9 % (FLUSH) 0.9 %
10 SYRINGE (ML) INJECTION EVERY 12 HOURS SCHEDULED
Status: DISCONTINUED | OUTPATIENT
Start: 2023-08-15 | End: 2023-08-15 | Stop reason: HOSPADM

## 2023-08-15 RX ORDER — HYDROCODONE BITARTRATE AND ACETAMINOPHEN 5; 325 MG/1; MG/1
1 TABLET ORAL EVERY 6 HOURS PRN
Qty: 12 TABLET | Refills: 0 | Status: SHIPPED | OUTPATIENT
Start: 2023-08-15

## 2023-08-15 RX ORDER — DIPHENHYDRAMINE HYDROCHLORIDE 50 MG/ML
12.5 INJECTION INTRAMUSCULAR; INTRAVENOUS
Status: DISCONTINUED | OUTPATIENT
Start: 2023-08-15 | End: 2023-08-15 | Stop reason: HOSPADM

## 2023-08-15 RX ORDER — PHENYLEPHRINE HYDROCHLORIDE 10 MG/ML
INJECTION INTRAVENOUS AS NEEDED
Status: DISCONTINUED | OUTPATIENT
Start: 2023-08-15 | End: 2023-08-15 | Stop reason: SURG

## 2023-08-15 RX ORDER — HYDRALAZINE HYDROCHLORIDE 20 MG/ML
5 INJECTION INTRAMUSCULAR; INTRAVENOUS
Status: DISCONTINUED | OUTPATIENT
Start: 2023-08-15 | End: 2023-08-15 | Stop reason: HOSPADM

## 2023-08-15 RX ORDER — NALOXONE HCL 0.4 MG/ML
0.4 VIAL (ML) INJECTION AS NEEDED
Status: DISCONTINUED | OUTPATIENT
Start: 2023-08-15 | End: 2023-08-15 | Stop reason: HOSPADM

## 2023-08-15 RX ORDER — ACETAMINOPHEN 325 MG/1
650 TABLET ORAL ONCE AS NEEDED
Status: DISCONTINUED | OUTPATIENT
Start: 2023-08-15 | End: 2023-08-15 | Stop reason: HOSPADM

## 2023-08-15 RX ORDER — HYDROCODONE BITARTRATE AND ACETAMINOPHEN 5; 325 MG/1; MG/1
1 TABLET ORAL ONCE AS NEEDED
Status: COMPLETED | OUTPATIENT
Start: 2023-08-15 | End: 2023-08-15

## 2023-08-15 RX ADMIN — LIDOCAINE HYDROCHLORIDE 100 MG: 20 INJECTION, SOLUTION EPIDURAL; INFILTRATION; INTRACAUDAL; PERINEURAL at 09:32

## 2023-08-15 RX ADMIN — SODIUM CHLORIDE, SODIUM GLUCONATE, SODIUM ACETATE, POTASSIUM CHLORIDE AND MAGNESIUM CHLORIDE 1000 ML: 526; 502; 368; 37; 30 INJECTION, SOLUTION INTRAVENOUS at 07:42

## 2023-08-15 RX ADMIN — PROPOFOL 70 MG: 10 INJECTION, EMULSION INTRAVENOUS at 09:36

## 2023-08-15 RX ADMIN — Medication 2000 MG: at 09:45

## 2023-08-15 RX ADMIN — PROPOFOL 50 MG: 10 INJECTION, EMULSION INTRAVENOUS at 09:34

## 2023-08-15 RX ADMIN — PROPOFOL 50 MG: 10 INJECTION, EMULSION INTRAVENOUS at 10:29

## 2023-08-15 RX ADMIN — TECHNETIUM TC 99M SULFUR COLLOID 1 DOSE: KIT at 07:52

## 2023-08-15 RX ADMIN — DEXAMETHASONE SODIUM PHOSPHATE 4 MG: 4 INJECTION, SOLUTION INTRAMUSCULAR; INTRAVENOUS at 09:52

## 2023-08-15 RX ADMIN — HYDROCODONE BITARTRATE AND ACETAMINOPHEN 1 TABLET: 5; 325 TABLET ORAL at 12:47

## 2023-08-15 RX ADMIN — PHENYLEPHRINE HYDROCHLORIDE 100 MCG: 10 INJECTION, SOLUTION INTRAVENOUS at 10:04

## 2023-08-15 RX ADMIN — PROPOFOL 150 MG: 10 INJECTION, EMULSION INTRAVENOUS at 09:32

## 2023-08-15 RX ADMIN — FENTANYL CITRATE 50 MCG: 50 INJECTION, SOLUTION INTRAMUSCULAR; INTRAVENOUS at 10:29

## 2023-08-15 RX ADMIN — FENTANYL CITRATE 50 MCG: 50 INJECTION, SOLUTION INTRAMUSCULAR; INTRAVENOUS at 09:52

## 2023-08-15 RX ADMIN — PHENYLEPHRINE HYDROCHLORIDE 100 MCG: 10 INJECTION, SOLUTION INTRAVENOUS at 10:23

## 2023-08-15 RX ADMIN — PROPOFOL 50 MG: 10 INJECTION, EMULSION INTRAVENOUS at 09:45

## 2023-08-15 RX ADMIN — PROPOFOL 80 MG: 10 INJECTION, EMULSION INTRAVENOUS at 09:40

## 2023-08-15 RX ADMIN — MIDAZOLAM HYDROCHLORIDE 2 MG: 1 INJECTION, SOLUTION INTRAMUSCULAR; INTRAVENOUS at 09:22

## 2023-08-15 RX ADMIN — PROPOFOL 40 MG: 10 INJECTION, EMULSION INTRAVENOUS at 09:50

## 2023-08-15 RX ADMIN — SODIUM CHLORIDE, SODIUM GLUCONATE, SODIUM ACETATE, POTASSIUM CHLORIDE AND MAGNESIUM CHLORIDE: 526; 502; 368; 37; 30 INJECTION, SOLUTION INTRAVENOUS at 10:42

## 2023-08-15 NOTE — OP NOTE
BREAST LUMPECTOMY WITH SENTINEL NODE BIOPSY  Procedure Report    Patient Name:  Anel Mullen  YOB: 1965    Date of Surgery:  8/15/2023     Indications: 58-year-old female with right-sided breast cancer    Pre-op Diagnosis:   Malignant neoplasm of upper-outer quadrant of left breast in female, estrogen receptor positive [C50.412, Z17.0]       Post-Op Diagnosis Codes:     * Malignant neoplasm of upper-outer quadrant of left breast in female, estrogen receptor positive [C50.412, Z17.0]    Procedure/CPTr Codes:      Procedure(s):  BREAST LUMPECTOMY WITH SENTINEL NODE BIOPSY           (INJECTION ON  SDS @07:00 A.M.)   ([facility]AL @08:00 [facility] )    Staff:  Surgeon(s):  Victoriano Sauer MD    Assistant: Love Cardona CSA    Anesthesia: General    Estimated Blood Loss:  20 mL      Specimen:          Specimens       ID Source Type Tests Collected By Collected At Frozen?    A Breast, Left Tissue TISSUE EXAM, [facility] ([facility])   Victoriano Sauer MD 8/15/23 1018     Description: medial breast margin - stich marks true margins    Comment: medial breast margin - stich marks true margins    B Breast, Left Tissue TISSUE EXAM, [facility] ([facility])   Victoriano Sauer MD 8/15/23 1016     Description: left breast lumpectomy - see coments below    Comment: Faxogram    Short superior  Long lateral  Double long deep    C Breast, Left Tissue TISSUE EXAM, [facility] ([facility])   Victoriano Sauer MD 8/15/23 1020 No    Description: posterior breast margin - stitch marks true margins    Comment: posterior breast margin - stitch marks true margins    D Breast, Left Tissue TISSUE EXAM, [facility] ([facility])   Victoriano Sauer MD 8/15/23 1021     Description: superior breast margin - stitch marks kamran margins    E Breast, Left Tissue TISSUE EXAM, [facility] ([facility])   Victoriano Sauer MD 8/15/23 1023 No    Description: inferior breast margin - stitch marks true margins    Comment: inferior breast  margin - stitch marks true margins    F Breast, Left Tissue TISSUE EXAM, [facility] ([facility])   Victoriano Sauer MD 8/15/23 1024 No    Description: anterior breast margin - stitch marks true margins    Comment: anterior breast margin - stitch marks true margins    G Breast, Left Tissue TISSUE EXAM, [facility] ([facility])   Victoriano Sauer MD 8/15/23 1025 No    Description: lateral breast margin - stitch marks true margins    Comment: lateral breast margin - stitch marks true margins    H [place] Lymph Node Tissue TISSUE EXAM, [facility] ([facility])   Victoriano Sauer MD 8/15/23 1036 No    Description: sentinel lymph node 1 - hot and blue    Comment: sentinel lymph node 1 - hot and blue                Findings: Successful excision of right breast mass with wire.  The clip was not excised because it had migrated and was outside of the operative field.  1 sentinel lymph node found      Description of Procedure: After informed consent was obtained, the patient was brought to the operating room and induced under general anesthesia.  Isosulfan blue was injected retroareolar.  She was prepped and draped in usual sterile fashion.  A timeout was performed.  Prior to incision, both operative fields were injected with local anesthesia.  A curvilinear incision was made between the wire and the area of interest in the breast.  Dissection was carried down with electrocautery.  The wire was identified and delivered into the wound.  The wire was followed until I came upon the reinforced portion.  Circumferential excision was performed of the breast tissue around the wire at approximately 1 cm margins.  Once the specimen was excised, it was sent for mammography.  This confirmed presence of the wire and specimen.  The clip was not identified, but it appeared to have migrated on preoperative imaging and was outside of the operative field.  Additional margins were then taken sharply from the anterior, posterior, superior,  inferior, medial, and lateral positions.  Attention was then turned to the axilla.  An incision was made at the inferior portion of the hairbearing area.  Dissection was carried down with electrocautery through the clavipectoral fascia.  A blue lymph node was discovered very quickly and it was noted to be hot.  This was excised.  There were no additional blue lymph nodes or any significant radioactivity within the axilla.  There were no suspicious nodes noted.  The wound was inspected for hemostasis.  The breast wound was irrigated and inspected for hemostasis.  Both incisions were closed with interrupted 3-0 Vicryl, running 4-0 Monocryl, and skin glue.  Patient tolerated this procedure well without any immediate complication.      [place] Node Biopsy for Breast Cancer - Left  Operation performed with curative intent. Yes   Tracer(s) used to identify sentinel nodes in the upfront surgery (non-neoadjuvant) setting (select all that apply). Dye and Radioactive tracer   Tracer(s) used to identify sentinel nodes in the neoadjuvant setting (select all that apply). N/A   All nodes (colored or non-colored) present at the end of a dye-filled lymphatic channel were removed. Yes   All significantly radioactive nodes were removed. Yes   All palpably suspicious nodes were removed. N/A   Biopsy-proven positive nodes marked with clips prior to chemotherapy were identified and removed. N/A            Complications: None    Assistant: Love Cardona CSA  was responsible for performing the following activities: Retraction, Suction, Closing, and Placing Dressing and their skilled assistance was necessary for the success of this case.    Victoriano Sauer MD     Date: 8/15/2023  Time: 11:07 CDT

## 2023-08-15 NOTE — ANESTHESIA POSTPROCEDURE EVALUATION
Patient: Anel Mullen    Procedure Summary       Date: 08/15/23 Room / Location: [facility] OR  / [facility] OR    Anesthesia Start: 0924 Anesthesia Stop: 1120    Procedure: BREAST LUMPECTOMY WITH SENTINEL NODE BIOPSY           (INJECTION ON  SDS @07:00 A.M.)   ([facility]LE LOCAL @08:00 [facility] ) (Left: Breast) Diagnosis:       Malignant neoplasm of upper-outer quadrant of left breast in female, estrogen receptor positive      (Malignant neoplasm of upper-outer quadrant of left breast in female, estrogen receptor positive [C50.412, Z17.0])    Surgeons: Victoriano Sauer MD Provider: Diana Guerra CRNA    Anesthesia Type: general ASA Status: 3            Anesthesia Type: general    Vitals  No vitals data found for the desired time range.          Post Anesthesia Care and Evaluation    Patient location during evaluation: PACU  Patient participation: waiting for patient participation  Level of consciousness: sleepy but conscious  Pain score: 0  Pain management: adequate    Airway patency: patent  Anesthetic complications: No anesthetic complications  PONV Status: none  Cardiovascular status: acceptable and hemodynamically stable  Respiratory status: acceptable, face mask and spontaneous ventilation  Hydration status: acceptable    Comments: ---------------------------               08/15/23                     1120       ---------------------------   BP:          136/77         Pulse:         80           Resp:          18           Temp:   97.5 øF (36.4 øC)   SpO2:          98%         ---------------------------

## 2023-08-15 NOTE — ANESTHESIA PREPROCEDURE EVALUATION
Anesthesia Evaluation     Patient summary reviewed and Nursing notes reviewed   no history of anesthetic complications:   NPO Solid Status: > 8 hours  NPO Liquid Status: > 2 hours           Airway   Mallampati: II  TM distance: >3 FB  Neck ROM: full  Dental - normal exam     Pulmonary     breath sounds clear to auscultation  (+) asthma,sleep apnea on CPAP  (-) shortness of breath, rhonchi, decreased breath sounds, wheezes, rales, not a smoker  Cardiovascular   Exercise tolerance: good (4-7 METS)    ECG reviewed  Rhythm: regular  Rate: normal    (+) hypertensionhyperlipidemia  (-) past MI, dysrhythmias, angina, MENDOZA, murmur, cardiac stents, DVT    ROS comment: EK23  Normal sinus rhythm  Low voltage QRS  Lateral infarct , age undetermined  Abnormal ECG  No previous ECGs available      Neuro/Psych  (-) seizures, TIA, CVA  GI/Hepatic/Renal/Endo    (+) obesity, GERD  (-) liver disease, no renal disease, diabetes, no thyroid disorder    Musculoskeletal     Abdominal   (+) obese   Substance History   (+) alcohol use (socially)  (-) drug use     OB/GYN    (-)  Pregnant        Other   blood dyscrasia anemia,   history of cancer (breast)    ROS/Med Hx Other: Hx: Allergy induced asthma: Uses albuterol nebulizer about once a month. Last used a month ago. Uses Breztri inhaler about once a month last used a month ago. Uses albuterol inhaler about once a month last used a month ago.     Hx: Sleep Apnea: CPAP compliant     Hx:GERD: controlled on Protonix                   Anesthesia Plan    ASA 3     general     intravenous induction     Anesthetic plan, risks, benefits, and alternatives have been provided, discussed and informed consent has been obtained with: patient.      CODE STATUS:

## 2023-08-15 NOTE — PROGRESS NOTES
Oncology SW met with patient for scheduled encounter.  Pt presents newly diagnosed with breast cancer and is scheduled for lumpectomy next day. On the date of consult, SW offered support and availably for  and processing of emotions with pt reaching out to met 1:1.  1.25 hr face to face time spent.     Pt. Presents alert and oriented x 3, affect bright and mood anxious.  Pt. Engages openly and was  encouraged to verbalize her feelings endorsing those of fear, anxiety, emotional distress. Pt. Becomes tearful as she shared emotions.   Patient was helped to identify, clarify and express her feelings associated with her newly diagnosed breast cancer.    Pt. Self reports long history of anxiety, depressive symptoms are mild and consistent with the adjustments she is experiencing.  Pt. Denies any thoughts, ideations, intent or plan of self inflicted harm or harm to others.   Anel Mullen was openly supported and feelings validated as she processed her thoughts and feelings.  Fears associated with treatment and diagnosis were explored.   LCSW Assisted in developing a personalized coping plan for reducing and managing stressful reactions.  Discussed cognitive reframing, relaxation and deep breathing.  Exercise, healthy diet and socialization were encouraged.  Provided pt with Gratitude journal. Pt. Shared that she is keeping daily log and has found much benefit.    Protective factors:  Articulates feelings well, family support, [employer] dangelo, friends, work support.   Optimistic outlook on life and circumstances.      Response:  Pt responded receptive to feedback and highly motivated to utilize her skills and supports as she continues to adjust and process emotions related to new diagnosis. Pt. Verbalizes some ease of distress prior to departure as she proceeds wit her surgery and follow up appointments and treatment planning.      Comments: LCSW offered ongoing support to provide , referral and continued  assessment of needs, coping and plan.  Pt. Verbalized understanding and will advise accordingly.

## 2023-08-15 NOTE — PRE-PROCEDURE NOTE
Patient's history and physical exam were reviewed, and no changes were noted.  The risks and benefits of ultrasound guided wire localization were discussed with the patient, and the patient had ample opportunity to ask questions.  Informed consent was obtained.

## 2023-08-15 NOTE — DISCHARGE INSTRUCTIONS
Okay to shower tomorrow.  No driving while on narcotic pain medication.  Call or return to office for fever, significant drainage from wound, increased redness or swelling, or significant pain that is not controlled by medication.

## 2023-08-15 NOTE — ANESTHESIA PROCEDURE NOTES
Airway  Urgency: elective    Date/Time: 8/15/2023 9:37 AM  Airway not difficult    General Information and Staff    Patient location during procedure: OR  CRNA/CAA: Diana Guerra, CRNA    Indications and Patient Condition  Indications for airway management: airway protection    Preoxygenated: yes  Mask difficulty assessment: 1 - vent by mask    Final Airway Details  Final airway type: endotracheal airway      Successful airway: ETT  Cuffed: yes   Successful intubation technique: video laryngoscopy  Facilitating devices/methods: intubating stylet  Endotracheal tube insertion site: oral  Blade: Carter  Blade size: 3  ETT size (mm): 7.0  Cormack-Lehane Classification: grade I - full view of glottis  Placement verified by: chest auscultation and capnometry   Measured from: lips  ETT/EBT  to lips (cm): 20  Number of attempts at approach: 1  Assessment: lips, teeth, and gum same as pre-op and atraumatic intubation

## 2023-08-17 LAB — REF LAB TEST METHOD: NORMAL

## 2023-08-18 ENCOUNTER — PATIENT OUTREACH (OUTPATIENT)
Dept: ONCOLOGY | Facility: CLINIC | Age: 58
End: 2023-08-18
Payer: COMMERCIAL

## 2023-08-18 DIAGNOSIS — C50.412 MALIGNANT NEOPLASM OF UPPER-OUTER QUADRANT OF LEFT BREAST IN FEMALE, ESTROGEN RECEPTOR POSITIVE: Primary | ICD-10-CM

## 2023-08-18 DIAGNOSIS — Z17.0 MALIGNANT NEOPLASM OF UPPER-OUTER QUADRANT OF LEFT BREAST IN FEMALE, ESTROGEN RECEPTOR POSITIVE: Primary | ICD-10-CM

## 2023-08-18 NOTE — PROGRESS NOTES
Called pt today to discuss ordering of Oncotype DX as requested by Dr. Alvarado. Informed pt of order and resulting times. Will schedule f/u appt for pt in approximately 2 weeks to discuss results once obtained with Dr. Alvarado. Pt stated understanding of all discussed and denied further questions.

## 2023-08-23 LAB — REF LAB TEST METHOD: NORMAL

## 2023-08-24 RX ORDER — TRAZODONE HYDROCHLORIDE 100 MG/1
100 TABLET ORAL NIGHTLY
Qty: 30 TABLET | Refills: 5 | Status: SHIPPED | OUTPATIENT
Start: 2023-08-24

## 2023-08-28 ENCOUNTER — OFFICE VISIT (OUTPATIENT)
Dept: SURGERY | Facility: CLINIC | Age: 58
End: 2023-08-28
Payer: COMMERCIAL

## 2023-08-28 VITALS
BODY MASS INDEX: 29.11 KG/M2 | OXYGEN SATURATION: 98 % | HEART RATE: 79 BPM | TEMPERATURE: 97.9 F | HEIGHT: 62 IN | DIASTOLIC BLOOD PRESSURE: 60 MMHG | WEIGHT: 158.2 LBS | SYSTOLIC BLOOD PRESSURE: 110 MMHG

## 2023-08-28 DIAGNOSIS — Z48.817 SURGICAL AFTERCARE, SKIN OR SUBCUTANEOUS TISSUE: Primary | ICD-10-CM

## 2023-08-28 PROCEDURE — 99024 POSTOP FOLLOW-UP VISIT: CPT | Performed by: SURGERY

## 2023-08-28 NOTE — PROGRESS NOTES
Chief Complaint   Patient presents with    Post-op     8/15/23 breast lumpectomy with sentinel node biopsy        58-year-old female 2 weeks status post left lumpectomy and sentinel lymph node biopsy.  She has been doing well postoperatively.  She does complain of some burning pain and numbness on the back of her left arm.  Otherwise she is recovering well.      Past Medical History:   Diagnosis Date    Abnormal mammogram of left breast     Allergic rhinitis     Allergic rhinitis - Seasonal, particularly tree pollens, etc.       Breast cancer 07/28/2023    Carpal tunnel syndrome     Cystic acne     Cystic acne - Reassurance given; not cancer       Dupuytren's contracture of both hands 09/11/2018    Encounter for screening mammogram for malignant neoplasm of breast     Eustachian tube disorder     GERD (gastroesophageal reflux disease)     Hyperlipidemia     Changed Pravachol to Lipitor, 6/2014    Hypertension     Iron deficiency anemia     Iron deficiency anemia - Discovered summer 2014. On oral iron replacement.       Mild intermittent asthma     Palpitations     Palpitations - Back to baseline. Unremarkable Holter March 2013       Plantar fasciitis     Plantar fasciitis - Left lower extremity. Recurrent. Controlled 5/2/2014       Pruritic rash     Pruritic rash - Apparently related to environmental allergens. Resolved 6/2014. Some recurrence on the right upper extremity, 1/2015       Restless legs syndrome (RLS) 09/11/2018    Seafood allergy 02/11/2019    Sleep apnea     cpap    Tendinitis     Tendinitis AND/OR tenosynovitis of the elbow region - Right          Past Surgical History:   Procedure Laterality Date    BREAST BIOPSY Left 07/28/2023    BREAST LUMPECTOMY WITH SENTINEL NODE BIOPSY Left 8/15/2023    Procedure: BREAST LUMPECTOMY WITH SENTINEL NODE BIOPSY           (INJECTION ON  SDS @07:00 A.M.)   (NEEDLE LOCAL @08:00 [facility] );  Surgeon: Victoriano Sauer MD;  Location: [facility];  Service: General;   Laterality: Left;    CARPAL TUNNEL RELEASE Right 02/17/2017    [place]. [place]    CARPAL TUNNEL RELEASE Left     LACERATION REPAIR Right     index  finger    TONSILLECTOMY      ULNAR TUNNEL RELEASE Right     WISDOM TOOTH EXTRACTION      4         Current Outpatient Medications:     albuterol (PROVENTIL) (2.5 MG/3ML) 0.083% nebulizer solution, Take 2.5 mg by nebulization Every 4 (Four) Hours As Needed., Disp: , Rfl:     albuterol sulfate  (90 Base) MCG/ACT inhaler, Inhale 2 puffs Every 6 (Six) Hours As Needed for Wheezing., Disp: 8.5 g, Rfl: 5    ALPRAZolam (XANAX) 1 MG tablet, Take 1 tablet by mouth 2 (Two) Times a Day As Needed for Anxiety., Disp: 60 tablet, Rfl: 0    amLODIPine (NORVASC) 5 MG tablet, Take 1 tablet by mouth Daily., Disp: 90 tablet, Rfl: 3    atorvastatin (LIPITOR) 40 MG tablet, Take 1 tablet by mouth Daily. (Patient taking differently: Take 1 tablet by mouth Every Night.), Disp: 90 tablet, Rfl: 3    Biotin 5000 MCG capsule, Take 1 capsule by mouth Daily., Disp: , Rfl:     Budeson-Glycopyrrol-Formoterol (BREZTRI) 160-9-4.8 MCG/ACT aerosol inhaler, Inhale 2 puffs 2 (Two) Times a Day As Needed., Disp: , Rfl:     Calcium Citrate-Vitamin D3 (CITRACAL) 315-6.25 MG-MCG tablet tablet, Take 2 tablets by mouth Daily., Disp: , Rfl:     cetirizine (zyrTEC) 10 MG tablet, Take 1 tablet by mouth Every Night., Disp: 90 tablet, Rfl: 3    ferrous gluconate 324 (37.5 Fe) MG tablet tablet, Take 1 tablet by mouth Daily With Breakfast., Disp: , Rfl:     fluticasone (FLONASE) 50 MCG/ACT nasal spray, 2 sprays into the nostril(s) as directed by provider Daily., Disp: 1 bottle, Rfl: 5    HYDROcodone-acetaminophen (NORCO) 5-325 MG per tablet, Take 1 tablet by mouth Every 6 (Six) Hours As Needed for Moderate Pain., Disp: 12 tablet, Rfl: 0    magnesium oxide (MAG-OX) 400 MG tablet, Take 1 tablet by mouth Daily. With zinc, Disp: , Rfl:     naloxone (NARCAN) 4 MG/0.1ML nasal spray, Call 911-Don't prime-Spray in  1 nostril for overdose. Repeat in 2-3 minutes in other nostril if no or minimal breathing/responsiveness., Disp: 2 each, Rfl: 0    Nebulizer misc, , Disp: , Rfl:     ondansetron ODT (ZOFRAN ODT) 4 MG disintegrating tablet, Take 1 tablet by mouth Every 8 (Eight) Hours As Needed for Nausea or Vomiting., Disp: 21 tablet, Rfl: 0    pantoprazole (PROTONIX) 40 MG EC tablet, Take 1 tablet by mouth Daily., Disp: 90 tablet, Rfl: 3    rOPINIRole (REQUIP) 1 MG tablet, Take 1 tablet by mouth 3 (Three) Times a Day. Take third dose 1 hour before bedtime., Disp: 270 tablet, Rfl: 3    traZODone (DESYREL) 100 MG tablet, Take 1 tablet by mouth Every Night., Disp: 30 tablet, Rfl: 5    triamcinolone (KENALOG) 0.1 % cream, Apply  topically 3 (Three) Times a Day As Needed for Rash., Disp: 75 g, Rfl: 5    venlafaxine (EFFEXOR) 37.5 MG tablet, Take 1 tablet by mouth 2 (Two) Times a Day., Disp: , Rfl:     vitamin D (ERGOCALCIFEROL) 1.25 MG (51411 UT) capsule capsule, Take 1 capsule by mouth 1 (One) Time Per Week. (Patient taking differently: Take 1 capsule by mouth 1 (One) Time Per Week. TAKES EVERY FRIDAY.), Disp: 15 capsule, Rfl: 3  No current facility-administered medications for this visit.    Facility-Administered Medications Ordered in Other Visits:     lidocaine 1% - EPINEPHrine 1:817378 (XYLOCAINE W/EPI) 1 %-1:598643 injection 2 mL, 2 mL, Infiltration, Once, Anirudh Thornton MD    Allergies   Allergen Reactions    Codeine Nausea And Vomiting     Abdominal pain    Bactrim [Sulfamethoxazole-Trimethoprim] Irritability    Shellfish-Derived Products Swelling     Gums swelling, abdominal pain, itching if touches skin    Sulfa Antibiotics Other (See Comments)     shaking    Meloxicam Rash       Immunization History   Administered Date(s) Administered    COVID-19 (COLLEEN) 01/31/2022    Hepatitis B Adult/Adolescent IM 08/25/2014, 09/25/2014    MMR 08/25/2014    Td (TDVAX) 03/23/1998    Tetanus 01/01/1998        Family History   Problem  Relation Age of Onset    Hypertension Mother     Diabetes Mother     Hyperlipidemia Mother     Osteoporosis Mother     Hypertension Father     Diabetes Father     Hyperlipidemia Father     Pancreatitis Father     Osteoarthritis Sister     Hyperlipidemia Sister     No Known Problems Son     Colon cancer Maternal Grandmother     Diabetes Maternal Grandfather     Heart disease Paternal Grandmother     No Known Problems Paternal Grandfather        Social History     Socioeconomic History    Marital status:      Spouse name: Anderson    Number of children: 3    Highest education level: Some college, no degree   Tobacco Use    Smoking status: Never     Passive exposure: Past    Smokeless tobacco: Never    Tobacco comments:     Passive as a child for 18 years   Vaping Use    Vaping Use: Never used   Substance and Sexual Activity    Alcohol use: Yes     Comment: occasionally    Drug use: Never    Sexual activity: Defer     Partners: Male     Birth control/protection: Post-menopausal     Comment: menopause @ age 44           Vitals:    23 0950   BP: 110/60   Pulse: 79   Temp: 97.9 øF (36.6 øC)   SpO2: 98%       Physical Exam  Constitutional:       Appearance: Normal appearance.   HENT:      Head: Normocephalic and atraumatic.   Cardiovascular:      Rate and Rhythm: Normal rate and regular rhythm.   Pulmonary:      Effort: Pulmonary effort is normal. No respiratory distress.   Skin:     Comments: Well-healing left breast and left axillary incisions   Neurological:      General: No focal deficit present.      Mental Status: She is alert and oriented to person, place, and time.       [facility]  [address] [place] [address]  Phone: [phone] or [phone]  Fax: [phone]  James Romero M.D., Medical Director    PATIENT NAME                           LABORATORY NO.  1800  LAMIN ZHANG                     [id number]  [id number]                         AGE               SEX  [facility]           CLIENT REF #  [facility]           58      1965  F    [id number]   [id number]    [place]                       REQUESTING M.D.     ATTENDING MHAILEY.     COPY TO[address] [place] [address]             DATE COLLECTED      DATE RECEIVED      DATE REPORTED  08/15/2023          08/15/2023         08/17/2023    DIAGNOSIS:  A.   BREAST MARGIN, LEFT MEDIAL:  Benign fibroadipose tissue.  Negative for carcinoma.  B.   BREAST, LUMPECTOMY, LEFT:  Grade 2 invasive ductal carcinoma, pT1b, pN0, see CAP checklist.  Final margins negative for carcinoma (specimens A, C-G).  C.   BREAST MARGIN, LEFT POSTERIOR:  Benign fibroadipose tissue.  Negative for carcinoma.  D.   BREAST MARGIN, LEFT SUPERIOR:  Benign fibroadipose tissue.  Negative for carcinoma.  E.   BREAST MARGIN, LEFT INFERIOR:  Benign fibroadipose tissue.  Negative for carcinoma.  F.   BREAST MARGIN, LEFT ANTERIOR:  Benign breast parenchyma with cautery artifact.  Negative for carcinoma.  G.   BREAST MARGIN, LEFT LATERAL:  Benign breast parenchyma.  Negative for carcinoma.  H.   SENTINEL NODE:  One lymph node negative for metastatic carcinoma (0/1).    INVASIVE CARCINOMA OF THE BREAST    SPECIMEN:  Procedure: Excision (less than total mastectomy)  Specimen Laterality: Left    TUMOR:  Tumor Site: Upper outer quadrant  Histologic Type: Invasive carcinoma of no special type (ductal)  Histologic Grade (Jovany Histologic Score):  Glandular (Acinar) / Tubular Differentiation: Score 3  Nuclear Pleomorphism: Score 2  Mitotic Rate: Score 1  Overall Grade: Grade 2 (scores of 6 or 7)  Tumor Size: 7 mm x 5 mm  Tumor Focality: Single focus of invasive carcinoma  Ductal Carcinoma In Situ (DCIS): Not identified  Lobular Carcinoma In Situ (LCIS): Not identified  Lymphovascular Invasion: Not identified  Dermal Lymphovascular Invasion: No skin present  Treatment Effect in  the Breast: No known presurgical therapy    MARGINS:  Margin Status for Invasive Carcinoma:  - All margins negative for invasive carcinoma  Distance from Invasive Carcinoma to Closest Margin: Greater than 6 mm  Closest Margin(s) to Invasive Carcinoma: Superior    REGIONAL LYMPH NODES:  Regional Lymph Node Status:  All regional lymph nodes negative for tumor  Total Number of Lymph Nodes Examined (sentinel and non-sentinel): 1  Number of [place] Nodes Examined: 1    PATHOLOGIC STAGE CLASSIFICATION (pTNM, AJCC 8th Edition):  Reporting of pT, pN, and (when applicable) pM categories is based on  information available to the pathologist at the time the report is issued. As per  the AJCC (Chapter 1, 8th Ed.) it is the managing physician-s responsibility to  establish the final pathologic stage based upon all pertinent information,  including but potentially not limited to this pathology report.  pT Category: pT1b  pN Category: pN0    Breast Biomarker Testing Performed on Previous Biopsy:  Estrogen Receptor (ER) Status:  - Positive (greater than 10% of cells demonstrate nuclear positivity)  Percentage of Cells with Nuclear Positivity: 100 %  Progesterone Receptor (PgR) Status:  - Positive  Percentage of Cells with Nuclear Positivity: 90 %  HER2 (by immunohistochemistry): Negative (Score 0)  Testing Performed on Case Number: [id number]    CLINICAL HISTORY:  Malignant neoplasm of upper-outer quadrant of left breast in female, estrogen  receptor positive    SPECIMENS RECEIVED:  A.  BREAST MARGIN, LEFT MEDIAL  B.  BREAST, LUMPECTOMY, LEFT  C.  BREAST MARGIN, LEFT POSTERIOR  D.  BREAST MARGIN, LEFT SUPERIOR  E.  BREAST MARGIN, LEFT INFERIOR  F.  BREAST MARGIN, LEFT ANTERIOR  G.  BREAST MARGIN, LEFT LATERAL  H.  SENTINEL NODE    MICROSCOPIC DESCRIPTION:  A.  Tissue blocks are prepared and slides are examined microscopically on all  specimens. See diagnosis for details.  C.  Tissue blocks are prepared and slides are examined  "microscopically on all  specimens. See diagnosis for details.    Professional interpretation rendered by Jossue James M.D. at [facility]  [facility], [address] [place] [address].    GROSS DESCRIPTION:  A.  Specimen received in formalin labeled \"breast, left medial breast margin\"  and consists of 2 portions of soft yellow adipose tissue ranging in size from  0.7 x 0.6 x 0.4 cm to 1.0 x 0.8 x 0.4 cm.  The larger portion has an attached  suture material on 1 side (new margin, inked blue).  Both portions are  trisected.  Specimen is submitted in its entirety in 2 cassettes with smaller  portion in A1 and larger portion in A2.  Time in formalin approximately 27  hours.  Cold ischemic time 26 minutes.  B.  Specimen received in formalin labeled \"left breast lumpectomy\" and  consists of a 6 g, 3.2 x 2.2 x 1.2 cm breast lumpectomy specimen oriented  with a short suture-superior, long suture-lateral, double long suture-deep.  There is a localization needle protruding from the apparent inferior/anterior  aspect.  Specimen is differentially inked: Anterior-green, inferior-blue,  lateral-orange, medial-yellow, posterior-black, superior-red.  Specimen is  sectioned revealing a centrally located area of apparent fat necrosis and  hemorrhage.  At the lateral anterior aspect is a 0.3 x 0.3 x 0.2 cm dense  fibrotic area.  No biopsy clip is grossly identified.  The remainder of the  cut surface is otherwise grossly unremarkable.  Specimen is submitted in  its entirety in 6 cassettes moving from superior to inferior.  Time in  formalin approximately 27 hours.  Cold ischemic time 14 minutes.  C.  Specimen received in formalin labeled \"breast, left posterior breast margin\"  and consists of a 1.0 x 0.7 x 0.4 cm fragmented portion of white-yellow  soft fibroadipose tissue with a portion of suture material on 1 side (new  margin, inked blue).  Specimen is trisected revealing a grossly  unremarkable cut surface.  No infarcts, " "areas of nodularity, or other  discrete mass lesions are grossly identified.  Specimen is submitted in its  entirety in a single cassette.  Time in formalin approximately 27 hours.  Cold ischemic time 24 minutes.  D.  Specimen received in formalin labeled \"breast, left superior breast margin\"  and consists of a 2.0 x 1.3 x 0.6 cm fragmented portion of white-yellow  soft fibroadipose tissue having an attached portion of suture material on 1  side designating the new margin (inked blue).  Sectioning reveals a grossly  unremarkable cut surface.  No infarcts, areas of nodularity, or other  discrete mass lesions are grossly identified.  Specimen is submitted in its  entirety in 2 cassettes.  Time in formalin approximately 27 hours.  Cold  ischemic time 23 minutes.  E.  Specimen received in formalin labeled \"breast, left inferior breast margin\"  and consists of a 1.0 x 0.8 x 0.3 cm fragmented portion of white-yellow  soft fibroadipose tissue having an attached portion of suture material on 1  side designating the new margin (inked blue).  Sectioning reveals a grossly  unremarkable cut surface.  No infarcts, areas of nodularity, or other  discrete mass lesions are grossly identified.  The specimen is submitted in  its entirety in a single cassette.  Time in formalin approximately 27 hours.  Cold ischemic time 21 minutes.  F.  Specimen received in formalin labeled \"breast, left anterior breast margin\"  and consists of a 1.5 x 1.4 x 0.4 cm fragmented portion of white-yellow  soft fibroadipose tissue oriented with a suture on one side designating new  margin (inked blue).  Sectioning reveals a grossly unremarkable cut  surface.  No infarcts, areas nodularity, or other discrete mass lesions are  grossly identified.  Specimen is submitted in its entirety in a single  cassette.  Time in formalin approximately 27 hours.  Cold family time 20  minutes.  G.  Specimen received in formalin labeled \"breast, left lateral breast " "margin\"  and consists of a 1.0 x 0.8 x 0.5 cm fragmented portion of white-yellow  soft fibroadipose tissue having an attached portion of suture material on 1  side designating the new margin (inked blue).  Specimen is bisected  revealing a grossly unremarkable cut surface.  No infarcts, areas of  nodularity, or other discrete mass lesions are grossly identified.  Specimen  is submitted in its entirety in a single cassette.  JARABELLA  CALVIN  Received in formalin labeled \"sentinel lymph node 1\" is a fatty encased  sentinel lymph node excision containing a lymph node candidate  measuring 0.8 x 0.5 x 0.4 cm. The candidate is step sectioned  perpendicular to the long axis at approximately 2-mm intervals. The cut  surface shows no discrete lesion. Sections are submitted entirely in a single  cassette.  TOMASA/JAYLIN    REVIEWED, DIAGNOSED AND ELECTRONICALLY  SIGNED BY:    Jossue James M.D.     ASSESSMENT    Diagnoses and all orders for this visit:    1. Surgical aftercare, skin or subcutaneous tissue (Primary)        PLAN    1.  Stage Ia ([id number], ER+/AR+/Her2 -, grade 2). Patient recovering well from her left breast lumpectomy and sentinel node biopsy.  Her margins were clear on pathology.  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  I will see her back in 1 month to see how this is going.  She will follow-up with medical oncology and radiation oncology meantime.              This document has been electronically signed by Victoriano Sauer MD on August 28, 2023 09:59 CDT    "

## 2023-08-29 ENCOUNTER — TELEPHONE (OUTPATIENT)
Dept: ONCOLOGY | Facility: CLINIC | Age: 58
End: 2023-08-29
Payer: COMMERCIAL

## 2023-08-29 ENCOUNTER — DOCUMENTATION (OUTPATIENT)
Dept: ONCOLOGY | Facility: CLINIC | Age: 58
End: 2023-08-29
Payer: COMMERCIAL

## 2023-08-29 LAB — REF LAB TEST METHOD: NORMAL

## 2023-08-29 NOTE — TELEPHONE ENCOUNTER
She will need hormone treatment. No chemo needed.   Ok to move her appointment earlier to discuss further

## 2023-08-29 NOTE — TELEPHONE ENCOUNTER
Called and spoke with pt regarding advice from Dr. Alvarado, appt moved up to 08/30 at 2:15 pm. Pt gives v/u.

## 2023-08-30 ENCOUNTER — SPECIALTY PHARMACY (OUTPATIENT)
Dept: ONCOLOGY | Facility: CLINIC | Age: 58
End: 2023-08-30
Payer: COMMERCIAL

## 2023-08-30 ENCOUNTER — OFFICE VISIT (OUTPATIENT)
Dept: ONCOLOGY | Facility: CLINIC | Age: 58
End: 2023-08-30
Payer: COMMERCIAL

## 2023-08-30 VITALS
WEIGHT: 157 LBS | BODY MASS INDEX: 29.18 KG/M2 | DIASTOLIC BLOOD PRESSURE: 73 MMHG | RESPIRATION RATE: 18 BRPM | OXYGEN SATURATION: 95 % | HEART RATE: 89 BPM | SYSTOLIC BLOOD PRESSURE: 149 MMHG

## 2023-08-30 DIAGNOSIS — C50.412 MALIGNANT NEOPLASM OF UPPER-OUTER QUADRANT OF LEFT BREAST IN FEMALE, ESTROGEN RECEPTOR POSITIVE: Primary | ICD-10-CM

## 2023-08-30 DIAGNOSIS — Z79.811 USE OF AROMATASE INHIBITORS: ICD-10-CM

## 2023-08-30 DIAGNOSIS — Z17.0 MALIGNANT NEOPLASM OF UPPER-OUTER QUADRANT OF LEFT BREAST IN FEMALE, ESTROGEN RECEPTOR POSITIVE: Primary | ICD-10-CM

## 2023-08-30 RX ORDER — ANASTROZOLE 1 MG/1
1 TABLET ORAL DAILY
Qty: 90 TABLET | Refills: 0 | Status: SHIPPED | OUTPATIENT
Start: 2023-08-30

## 2023-08-30 NOTE — PROGRESS NOTES
Care Coordination General Call Note    Contacted patient regarding a new prescription of arimidex. It was discussed with pt in full the specialty program services and she would like to be apart of the program. She will be picking up her prescription at [facility] today.         Aliza Blanco, Pharmacy Technician  8/30/2023  14:47 CDT

## 2023-08-31 ENCOUNTER — OFFICE VISIT (OUTPATIENT)
Dept: RADIATION ONCOLOGY | Facility: HOSPITAL | Age: 58
End: 2023-08-31
Payer: COMMERCIAL

## 2023-08-31 VITALS
BODY MASS INDEX: 29.56 KG/M2 | SYSTOLIC BLOOD PRESSURE: 126 MMHG | TEMPERATURE: 96.7 F | WEIGHT: 159 LBS | HEART RATE: 84 BPM | DIASTOLIC BLOOD PRESSURE: 76 MMHG | OXYGEN SATURATION: 97 % | RESPIRATION RATE: 18 BRPM

## 2023-08-31 DIAGNOSIS — C50.412 MALIGNANT NEOPLASM OF UPPER-OUTER QUADRANT OF LEFT BREAST IN FEMALE, ESTROGEN RECEPTOR POSITIVE: Primary | ICD-10-CM

## 2023-08-31 DIAGNOSIS — Z17.0 MALIGNANT NEOPLASM OF UPPER-OUTER QUADRANT OF LEFT BREAST IN FEMALE, ESTROGEN RECEPTOR POSITIVE: Primary | ICD-10-CM

## 2023-08-31 PROCEDURE — G0463 HOSPITAL OUTPT CLINIC VISIT: HCPCS | Performed by: STUDENT IN AN ORGANIZED HEALTH CARE EDUCATION/TRAINING PROGRAM

## 2023-08-31 NOTE — PATIENT INSTRUCTIONS
We will schedule your CT SIM (radiation planning) once approved by your insurance.  The approval process can take up to 7 days to complete depending on your insurance company.  If you have not been called to schedule your CT SIM appointment within 7 days, please call the office [phone].    Review patient education folder information:    [facility] Radiation Therapy for Cancer booklet  Radiation Simulation   Radiation Skin Care    On day of radiation mapping (CT Simulation):  Check in at [facility]-enter the last 4 numbers of your Social Security number or your last name  Do not wear jewelry  Wear comfortable and easy to remove clothing-you will change to a gown for the mapping (Ct Simulation) procedure  If the radiation oncologist wants dye (contrast) for your mapping, we will start an IV. If you have an implanted port, the nurse will access it for use if you prefer.

## 2023-08-31 NOTE — PROGRESS NOTES
Radiation Oncology Follow Up    Patient: Anel Mullen   YOB: 1965   Medical Record Number: [id number]   Date of Visit  August 31, 2023   Primary Diagnosis:  Cancer Staging   Malignant neoplasm of upper-outer quadrant of left breast in female, estrogen receptor positive  Staging form: Breast, AJCC 8th Edition  - Clinical stage from 8/7/2023: Stage IA (cT1b, cN0, cM0, G2, ER+, ME+, HER2-) - Signed by Yasir Raines MD on 8/7/2023  - Pathologic stage from 8/31/2023: Stage IA (pT1b, pN0(sn), cM0, G2, ER+, ME+, HER2-) - Signed by Yasir Raines MD on 8/31/2023        ASSESSMENT/PLAN:  Ms. Mullen is a 58 y.o. female with left upper outer breast invasive ductal carcinoma, grade 2, ER/ME+ HER2-, pT1b N0 cM0, s/p lumpectomy+SLNB. Started anastrozole. Personally reviewed available physician notes, imaging, and pathology. Reviewed NCCN guidelines and discussed treatment options.    Doing well post-op. Reviewed her pathological findings from surgery which shows an early stage localized breast cancer. She meets criteria for HECTOR partial breast treatment. Planning 30 Gy in 5 fractions by IMRT every other day. Discussed the logistics of radiation therapy. Potential side effects include localized dermatitis and fibrosis, small risk of damage to underlying ribs, lung, heart, small risk of chest wall pain, small risk of secondary malignancy. Radiation will start around 6-8 weeks post-op to allow adequate healing. She may continue anastrozole. She is agreeable to radiotherapy at this time. Will schedule CT simulation.        Prior Treatment:  - left lumpectomy and SLNB (1 LN) 8/15/23  - Anastrozole started 8/30/23    Implanted Devices: none    History of Present Illness:  Ms. Mullen is a 58 y.o. female with a left upper outer breast invasive ductal carcinoma, grade 2, ER/ME+ HER2-, cT1b N0 M0. Screening mammogram on 7/7/23 detected a spiculated ALEXANDRA asymmetry. Diagnostic ultrasound on 7/11/23 showed  a 7 mm nodule in the left breast at 1:00. Biopsy on 7/28/23 showed grade 2 IDC, ER/OH+ HER2-. She underwent lumpectomy+SLNB on 8/15/23 which showed a 7 mm IDC, grade 2, negative margins. She started anastrozole on 8/30/23. Returns today to discuss radiation.    Doing well post-op. Scars healing well. Does not feel much different.    Review of Systems    All other systems reviewed and are negative.    Past Medical History:   Diagnosis Date    Abnormal mammogram of left breast     Allergic rhinitis     Allergic rhinitis - Seasonal, particularly tree pollens, etc.       Breast cancer 07/28/2023    Carpal tunnel syndrome     Cystic acne     Cystic acne - Reassurance given; not cancer       Dupuytren's contracture of both hands 09/11/2018    Encounter for screening mammogram for malignant neoplasm of breast     Eustachian tube disorder     GERD (gastroesophageal reflux disease)     Hyperlipidemia     Changed Pravachol to Lipitor, 6/2014    Hypertension     Iron deficiency anemia     Iron deficiency anemia - Discovered summer 2014. On oral iron replacement.       Mild intermittent asthma     Palpitations     Palpitations - Back to baseline. Unremarkable Holter March 2013       Plantar fasciitis     Plantar fasciitis - Left lower extremity. Recurrent. Controlled 5/2/2014       Pruritic rash     Pruritic rash - Apparently related to environmental allergens. Resolved 6/2014. Some recurrence on the right upper extremity, 1/2015       Restless legs syndrome (RLS) 09/11/2018    Seafood allergy 02/11/2019    Sleep apnea     cpap    Tendinitis     Tendinitis AND/OR tenosynovitis of the elbow region - Right           Past Surgical History:   Procedure Laterality Date    BREAST BIOPSY Left 07/28/2023    BREAST LUMPECTOMY WITH SENTINEL NODE BIOPSY Left 8/15/2023    Procedure: BREAST LUMPECTOMY WITH SENTINEL NODE BIOPSY           (INJECTION ON  SDS @07:00 A.M.)   (NEEDLE LOCAL @08:00 [facility] );  Surgeon: Victoriano Sauer MD;   Location: [facility];  Service: General;  Laterality: Left;    CARPAL TUNNEL RELEASE Right 02/17/2017    Dr. Malia [place], [place]    CARPAL TUNNEL RELEASE Left     LACERATION REPAIR Right     index  finger    TONSILLECTOMY      ULNAR TUNNEL RELEASE Right     WISDOM TOOTH EXTRACTION      4      Family History   Problem Relation Age of Onset    Hypertension Mother     Diabetes Mother     Hyperlipidemia Mother     Osteoporosis Mother     Hypertension Father     Diabetes Father     Hyperlipidemia Father     Pancreatitis Father     Osteoarthritis Sister     Hyperlipidemia Sister     No Known Problems Son     Colon cancer Maternal Grandmother     Diabetes Maternal Grandfather     Heart disease Paternal Grandmother     No Known Problems Paternal Grandfather         Social History     Socioeconomic History    Marital status:      Spouse name: Anderson    Number of children: 3    Highest education level: Some college, no degree   Tobacco Use    Smoking status: Never     Passive exposure: Past    Smokeless tobacco: Never    Tobacco comments:     Passive as a child for 18 years   Vaping Use    Vaping Use: Never used   Substance and Sexual Activity    Alcohol use: Yes     Comment: occasionally    Drug use: Never    Sexual activity: Defer     Partners: Male     Birth control/protection: Post-menopausal     Comment: menopause @ age 44        Allergies:  Codeine, Bactrim [sulfamethoxazole-trimethoprim], Shellfish-derived products, Sulfa antibiotics, and Meloxicam   Prior to Admission medications    Medication Sig Start Date End Date Taking? Authorizing Provider   albuterol (PROVENTIL) (2.5 MG/3ML) 0.083% nebulizer solution Take 2.5 mg by nebulization. 4/9/23 4/9/24  Provider, MD Adalberto   albuterol sulfate  (90 Base) MCG/ACT inhaler Inhale 2 puffs Every 6 (Six) Hours As Needed for Wheezing. 7/27/23   Siobhan Cervantes MD   ALPRAZolam (XANAX) 1 MG tablet Take 1 tablet by mouth 2 (Two) Times a Day As Needed for  Anxiety. 8/7/23   Siobhan Cervantes MD   amLODIPine (NORVASC) 5 MG tablet Take 1 tablet by mouth Daily. 7/27/23   Siobhan Cervantes MD   atorvastatin (LIPITOR) 40 MG tablet Take 1 tablet by mouth Daily. 7/27/23   Siobhan Cervantes MD   Biotin 5000 MCG capsule Take  by mouth.    Adalberto Griffiths MD   Budeson-Glycopyrrol-Formoterol (BREZTRI) 160-9-4.8 MCG/ACT aerosol inhaler Inhale 2 puffs 2 (Two) Times a Day.    Adlaberto Griffiths MD   buPROPion XL (Wellbutrin XL) 150 MG 24 hr tablet Take 1 tablet by mouth Daily. 7/27/23   Siobhan Cervantes MD   Calcium Citrate-Vitamin D3 (CITRACAL) 315-6.25 MG-MCG tablet tablet Take 2 tablets by mouth Daily.    Adalberto Griffiths MD   cetirizine (zyrTEC) 10 MG tablet Take 1 tablet by mouth Every Night. 12/4/20   Rohan Bryan MD   ferrous gluconate 324 (37.5 Fe) MG tablet tablet Take  by mouth Daily With Breakfast.    Adalberto Griffiths MD   fluticasone (FLONASE) 50 MCG/ACT nasal spray 2 sprays into the nostril(s) as directed by provider Daily. 12/4/20   Rohan Bryan MD   magnesium oxide (MAG-OX) 400 MG tablet Take 1 tablet by mouth Daily. With zinc    Adalberto Griffiths MD   Nebulizer misc r05.1, r06.2, j45.21, r07.89 4/9/23   Adalberto Griffiths MD   ondansetron ODT (ZOFRAN ODT) 4 MG disintegrating tablet Take 1 tablet by mouth Every 8 (Eight) Hours As Needed for Nausea or Vomiting. 2/17/20   Araceli Galvez APRN   pantoprazole (PROTONIX) 40 MG EC tablet Take 1 tablet by mouth Daily. 7/27/23   Siobhan Cervantes MD   rOPINIRole (REQUIP) 1 MG tablet Take 1 tablet by mouth 3 (Three) Times a Day. Take third dose 1 hour before bedtime. 7/27/23   Siobhan Cervantes MD   triamcinolone (KENALOG) 0.1 % cream Apply  topically 3 (Three) Times a Day As Needed for Rash. 6/19/18   Rohan Bryan MD   vitamin D (ERGOCALCIFEROL) 1.25 MG (29071 UT) capsule capsule Take 1 capsule by mouth 1 (One) Time Per Week. 7/27/23   Siobhan Cervantes MD      Pain:(on a scale of 0-10)     Pain Score    08/31/23 1409   PainSc: 0-No pain      Anel Mullen reports a pain score of 0.  Given her pain assessment as noted, treatment options were discussed and the following options were decided upon as a follow-up plan to address the patient's pain: continuation of current treatment plan for pain.       Quality of Life:   ECOG: (0) Fully active, able to carry on all predisease performance without restriction    Advance Directives (For Healthcare)  Pre-existing AND/MOST/POLST Order: No  Advance Directive Status: Patient does not have advance directive  Have you reviewed your Advance Directive and is it valid for this stay?: Not applicable  Literature Provided on Advance Directives: No  Patient Requests Assistance on Advance Directives: Patient Declined    PHQ-9 Depression Screening:  Little interest or pleasure in doing things? 0-->not at all   Feeling down, depressed, or hopeless? 0-->not at all   Trouble falling or staying asleep, or sleeping too much?     Feeling tired or having little energy?     Poor appetite or overeating?     Feeling bad about yourself - or that you are a failure or have let yourself or your family down?     Trouble concentrating on things, such as reading the newspaper or watching television?     Moving or speaking so slowly that other people could have noticed? Or the opposite - being so fidgety or restless that you have been moving around a lot more than usual?     Thoughts that you would be better off dead, or of hurting yourself in some way?     PHQ-9 Total Score 0   If you checked off any problems, how difficult have these problems made it for you to do your work, take care of things at home, or get along with other people?      PHQ-9 Total Score: 0        Physical Examination:  Vitals:    08/31/23 1409   BP: 126/76   Pulse: 84   Resp: 18   Temp: 96.7 øF (35.9 øC)   SpO2: 97%     Wt Readings from Last 3 Encounters:   08/31/23 72.1 kg (159 lb)   08/30/23 71.2 kg (157 lb)    23 71.8 kg (158 lb 3.2 oz)     Body mass index is 29.56 kg/mý.    Constitutional: The patient is a well-developed, well-nourished female in no acute distress.  HEENT: Normocephalic, atraumatic. EOMI. Nose and ears without abnormalities upon visual inspection.  Respiratory: Breathing comfortably on room air.  Breasts: left breast and axilla scars healing appropriately. No concerning masses noted around scars. Female nurse chaperone was present for the exam.  Skin: No abnormal lesions noted on visible skin  Neurologic: Cranial nerves grossly intact, with no focal neurological deficits noted on exam.  Psychiatric: Alert and oriented. Normal affect, with no anxiety or depression noted.      Imaging:  Mammo Post Device Placement Left    Addendum Date: 8/15/2023    Addendum: The entirety of the thickened portion of the wire is included in the specimen.    US Breast Left Limited    Result Date: 2023  1.  Within the left breast in the 1:00 position, 7 cm from the nipple is an irregular hypoechoic nodule with vague margins.  Ultrasound-guided biopsy is warranted. 2.  ACR BI-RADS Category 4:  Suspicious abnormality.       Pathology:   [facility]  [address] [place] [address]  Phone: [phone] or [phone]  Fax: [phone]  James Romero M.D., Medical Director    PATIENT NAME                           LABORATORY NO.  [id number]  LAMIN ZHANG                     [id number]  [id number]                         AGE              SEX  [facility]           CLIENT REF #  [facility]           58      1965  F    [id number]   [id number]    [place]                       REQUESTING M.D.     ATTENDING MJuliD.     COPY TO[address] [address]             DATE COLLECTED      DATE RECEIVED      DATE REPORTED  08/15/2023          08/15/2023         2023    DIAGNOSIS:  A.   BREAST  MARGIN, LEFT MEDIAL:  Benign fibroadipose tissue.  Negative for carcinoma.  B.   BREAST, LUMPECTOMY, LEFT:  Grade 2 invasive ductal carcinoma, pT1b, pN0, see CAP checklist.  Final margins negative for carcinoma (specimens A, C-G).  C.   BREAST MARGIN, LEFT POSTERIOR:  Benign fibroadipose tissue.  Negative for carcinoma.  D.   BREAST MARGIN, LEFT SUPERIOR:  Benign fibroadipose tissue.  Negative for carcinoma.  E.   BREAST MARGIN, LEFT INFERIOR:  Benign fibroadipose tissue.  Negative for carcinoma.  F.   BREAST MARGIN, LEFT ANTERIOR:  Benign breast parenchyma with cautery artifact.  Negative for carcinoma.  G.   BREAST MARGIN, LEFT LATERAL:  Benign breast parenchyma.  Negative for carcinoma.  H.   SENTINEL NODE:  One lymph node negative for metastatic carcinoma (0/1).    INVASIVE CARCINOMA OF THE BREAST    SPECIMEN:  Procedure: Excision (less than total mastectomy)  Specimen Laterality: Left    TUMOR:  Tumor Site: Upper outer quadrant  Histologic Type: Invasive carcinoma of no special type (ductal)  Histologic Grade (Jovany Histologic Score):  Glandular (Acinar) / Tubular Differentiation: Score 3  Nuclear Pleomorphism: Score 2  Mitotic Rate: Score 1  Overall Grade: Grade 2 (scores of 6 or 7)  Tumor Size: 7 mm x 5 mm  Tumor Focality: Single focus of invasive carcinoma  Ductal Carcinoma In Situ (DCIS): Not identified  Lobular Carcinoma In Situ (LCIS): Not identified  Lymphovascular Invasion: Not identified  Dermal Lymphovascular Invasion: No skin present  Treatment Effect in the Breast: No known presurgical therapy    MARGINS:  Margin Status for Invasive Carcinoma:  - All margins negative for invasive carcinoma  Distance from Invasive Carcinoma to Closest Margin: Greater than 6 mm  Closest Margin(s) to Invasive Carcinoma: Superior    REGIONAL LYMPH NODES:  Regional Lymph Node Status:  All regional lymph nodes negative for tumor  Total Number of Lymph Nodes Examined (sentinel and non-sentinel): 1  Number of [place]  "Nodes Examined: 1    PATHOLOGIC STAGE CLASSIFICATION (pTNM, AJCC 8th Edition):  Reporting of pT, pN, and (when applicable) pM categories is based on  information available to the pathologist at the time the report is issued. As per  the AJCC (Chapter 1, 8th Ed.) it is the managing physician-s responsibility to  establish the final pathologic stage based upon all pertinent information,  including but potentially not limited to this pathology report.  pT Category: pT1b  pN Category: pN0    Breast Biomarker Testing Performed on Previous Biopsy:  Estrogen Receptor (ER) Status:  - Positive (greater than 10% of cells demonstrate nuclear positivity)  Percentage of Cells with Nuclear Positivity: 100 %  Progesterone Receptor (PgR) Status:  - Positive  Percentage of Cells with Nuclear Positivity: 90 %  HER2 (by immunohistochemistry): Negative (Score 0)  Testing Performed on Case Number: [id number]    CLINICAL HISTORY:  Malignant neoplasm of upper-outer quadrant of left breast in female, estrogen  receptor positive    SPECIMENS RECEIVED:  A.  BREAST MARGIN, LEFT MEDIAL  B.  BREAST, LUMPECTOMY, LEFT  C.  BREAST MARGIN, LEFT POSTERIOR  D.  BREAST MARGIN, LEFT SUPERIOR  E.  BREAST MARGIN, LEFT INFERIOR  F.  BREAST MARGIN, LEFT ANTERIOR  G.  BREAST MARGIN, LEFT LATERAL  H.  SENTINEL NODE    MICROSCOPIC DESCRIPTION:  A.  Tissue blocks are prepared and slides are examined microscopically on all  specimens. See diagnosis for details.  C.  Tissue blocks are prepared and slides are examined microscopically on all  specimens. See diagnosis for details.    Professional interpretation rendered by Jossue James M.D. at [facility],  [place], [address] [place] [address].    GROSS DESCRIPTION:  A.  Specimen received in formalin labeled \"breast, left medial breast margin\"  and consists of 2 portions of soft yellow adipose tissue ranging in size from  0.7 x 0.6 x 0.4 cm to 1.0 x 0.8 x 0.4 cm.  The larger portion has an " "attached  suture material on 1 side (new margin, inked blue).  Both portions are  trisected.  Specimen is submitted in its entirety in 2 cassettes with smaller  portion in A1 and larger portion in A2.  Time in formalin approximately 27  hours.  Cold ischemic time 26 minutes.  B.  Specimen received in formalin labeled \"left breast lumpectomy\" and  consists of a 6 g, 3.2 x 2.2 x 1.2 cm breast lumpectomy specimen oriented  with a short suture-superior, long suture-lateral, double long suture-deep.  There is a localization needle protruding from the apparent inferior/anterior  aspect.  Specimen is differentially inked: Anterior-green, inferior-blue,  lateral-orange, medial-yellow, posterior-black, superior-red.  Specimen is  sectioned revealing a centrally located area of apparent fat necrosis and  hemorrhage.  At the lateral anterior aspect is a 0.3 x 0.3 x 0.2 cm dense  fibrotic area.  No biopsy clip is grossly identified.  The remainder of the  cut surface is otherwise grossly unremarkable.  Specimen is submitted in  its entirety in 6 cassettes moving from superior to inferior.  Time in  formalin approximately 27 hours.  Cold ischemic time 14 minutes.  C.  Specimen received in formalin labeled \"breast, left posterior breast margin\"  and consists of a 1.0 x 0.7 x 0.4 cm fragmented portion of white-yellow  soft fibroadipose tissue with a portion of suture material on 1 side (new  margin, inked blue).  Specimen is trisected revealing a grossly  unremarkable cut surface.  No infarcts, areas of nodularity, or other  discrete mass lesions are grossly identified.  Specimen is submitted in its  entirety in a single cassette.  Time in formalin approximately 27 hours.  Cold ischemic time 24 minutes.  D.  Specimen received in formalin labeled \"breast, left superior breast margin\"  and consists of a 2.0 x 1.3 x 0.6 cm fragmented portion of white-yellow  soft fibroadipose tissue having an attached portion of suture material on " "1  side designating the new margin (inked blue).  Sectioning reveals a grossly  unremarkable cut surface.  No infarcts, areas of nodularity, or other  discrete mass lesions are grossly identified.  Specimen is submitted in its  entirety in 2 cassettes.  Time in formalin approximately 27 hours.  Cold  ischemic time 23 minutes.  E.  Specimen received in formalin labeled \"breast, left inferior breast margin\"  and consists of a 1.0 x 0.8 x 0.3 cm fragmented portion of white-yellow  soft fibroadipose tissue having an attached portion of suture material on 1  side designating the new margin (inked blue).  Sectioning reveals a grossly  unremarkable cut surface.  No infarcts, areas of nodularity, or other  discrete mass lesions are grossly identified.  The specimen is submitted in  its entirety in a single cassette.  Time in formalin approximately 27 hours.  Cold ischemic time 21 minutes.  F.  Specimen received in formalin labeled \"breast, left anterior breast margin\"  and consists of a 1.5 x 1.4 x 0.4 cm fragmented portion of white-yellow  soft fibroadipose tissue oriented with a suture on one side designating new  margin (inked blue).  Sectioning reveals a grossly unremarkable cut  surface.  No infarcts, areas nodularity, or other discrete mass lesions are  grossly identified.  Specimen is submitted in its entirety in a single  cassette.  Time in formalin approximately 27 hours.  Cold family time 20  minutes.  G.  Specimen received in formalin labeled \"breast, left lateral breast margin\"  and consists of a 1.0 x 0.8 x 0.5 cm fragmented portion of white-yellow  soft fibroadipose tissue having an attached portion of suture material on 1  side designating the new margin (inked blue).  Specimen is bisected  revealing a grossly unremarkable cut surface.  No infarcts, areas of  nodularity, or other discrete mass lesions are grossly identified.  Specimen  is submitted in its entirety in a single cassette.  TOMASA  H.  Received in " "formalin labeled \"sentinel lymph node 1\" is a fatty encased  sentinel lymph node excision containing a lymph node candidate  measuring 0.8 x 0.5 x 0.4 cm. The candidate is step sectioned  perpendicular to the long axis at approximately 2-mm intervals. The cut  surface shows no discrete lesion. Sections are submitted entirely in a single  cassette.  JARABELLA/RLL    REVIEWED, DIAGNOSED AND ELECTRONICALLY  SIGNED BY:    Jossue James M.D.  CPT CODES:  88307x8         Labs:   Lab Results   Component Value Date    GLUCOSE 110 (H) 07/12/2023    BUN 19 07/12/2023    CREATININE 0.91 07/12/2023    EGFRIFNONA 72 02/05/2019    BCR 20.9 07/12/2023    K 4.0 07/12/2023    CO2 34.0 (H) 07/12/2023    CALCIUM 9.4 07/12/2023    ALBUMIN 4.1 07/12/2023    AST 27 07/12/2023    ALT 36 (H) 07/12/2023       WBC   Date Value Ref Range Status   07/11/2023 5.97 3.40 - 10.80 10*3/mm3 Final     Hemoglobin   Date Value Ref Range Status   07/11/2023 13.3 12.0 - 15.9 g/dL Final     Hematocrit   Date Value Ref Range Status   07/11/2023 39.8 34.0 - 46.6 % Final     Platelets   Date Value Ref Range Status   07/11/2023 244 140 - 450 10*3/mm3 Final         Electronically signed by Yasir Raines MD 08/31/23 14:15 CDT  "

## 2023-09-01 ENCOUNTER — SPECIALTY PHARMACY (OUTPATIENT)
Dept: ONCOLOGY | Facility: CLINIC | Age: 58
End: 2023-09-01
Payer: COMMERCIAL

## 2023-09-03 NOTE — PROGRESS NOTES
Subjective     Anel Mullen was seen in follow up for breast cancer.     No new health issues since last visit.   No new medications.   No new hospitalization.   Feels well.       Past Medical History, Past Surgical History, Social History, Family History have been reviewed and are without significant changes except as mentioned.        Medications:  The current medication list was reviewed in the EMR    ALLERGIES:    Allergies   Allergen Reactions    Codeine Nausea And Vomiting     Abdominal pain    Bactrim [Sulfamethoxazole-Trimethoprim] Irritability    Shellfish-Derived Products Swelling     Gums swelling, abdominal pain, itching if touches skin    Sulfa Antibiotics Other (See Comments)     shaking    Meloxicam Rash       Objective      Vitals:    08/30/23 1407   BP: 149/73   Pulse: 89   Resp: 18   SpO2: 95%   Weight: 71.2 kg (157 lb)   PainSc:   2          No data to display                Physical Exam  Vitals and nursing note reviewed.   Constitutional:       Appearance: Normal appearance.   Neurological:      General: No focal deficit present.      Mental Status: She is alert and oriented to person, place, and time. Mental status is at baseline.   Psychiatric:         Mood and Affect: Mood normal.         Behavior: Behavior normal.         Thought Content: Thought content normal.         RECENT LABS:Independently reviewed and summarized  Hematology WBC   Date Value Ref Range Status   07/11/2023 5.97 3.40 - 10.80 10*3/mm3 Final     RBC   Date Value Ref Range Status   07/11/2023 4.34 3.77 - 5.28 10*6/mm3 Final     Hemoglobin   Date Value Ref Range Status   07/11/2023 13.3 12.0 - 15.9 g/dL Final     Hematocrit   Date Value Ref Range Status   07/11/2023 39.8 34.0 - 46.6 % Final     Platelets   Date Value Ref Range Status   07/11/2023 244 140 - 450 10*3/mm3 Final       Lab Results   Component Value Date    GLUCOSE 110 (H) 07/12/2023    BUN 19 07/12/2023    CREATININE 0.91 07/12/2023    EGFR 74.2 07/12/2023     BCR 20.9 2023    K 4.0 2023    CO2 34.0 (H) 2023    CALCIUM 9.4 2023    ALBUMIN 4.1 2023    BILITOT 0.7 2023    AST 27 2023    ALT 36 (H) 2023          Pathology (Result reviewed):     8/15/23      [facility]  [address] [place] [address]  Phone: [phone] or [phone]  Fax: [phone]  James Romero M.D., Medical Director    PATIENT NAME                           LABORATORY NO.  [id number]  LAMIN MULLEN                     [id number]  [id number]                         AGE              SEX  SSN           CLIENT REF #  [facility]           58      1965  F    [id number]   [id number]    [place]                       REQUESTING M.D.     [place] [address]             DATE COLLECTED      DATE RECEIVED      DATE REPORTED  08/15/2023          08/15/2023         2023    DIAGNOSIS:  A.   BREAST MARGIN, LEFT MEDIAL:  Benign fibroadipose tissue.  Negative for carcinoma.  B.   BREAST, LUMPECTOMY, LEFT:  Grade 2 invasive ductal carcinoma, pT1b, pN0, see CAP checklist.  Final margins negative for carcinoma (specimens A, C-G).  C.   BREAST MARGIN, LEFT POSTERIOR:  Benign fibroadipose tissue.  Negative for carcinoma.  D.   BREAST MARGIN, LEFT SUPERIOR:  Benign fibroadipose tissue.  Negative for carcinoma.  E.   BREAST MARGIN, LEFT INFERIOR:  Benign fibroadipose tissue.  Negative for carcinoma.  F.   BREAST MARGIN, LEFT ANTERIOR:  Benign breast parenchyma with cautery artifact.  Negative for carcinoma.  G.   BREAST MARGIN, LEFT LATERAL:  Benign breast parenchyma.  Negative for carcinoma.  H.   SENTINEL NODE:  One lymph node negative for metastatic carcinoma (0/1).     Lamin Mullen reports a pain score of 2.  Given her pain assessment as noted, treatment options were discussed and the  following options were decided upon as a follow-up plan to address the patient's pain: continuation of current treatment plan for pain.    Patient screened negative for depression based on a PHQ-9 score of 0 on 8/31/2023.       Assessment & Plan     (1) Left breast cancer   Stage IA (pT1b, pN0, cM0)   % MT 90% HER2 negative   Oncotype DX score: 17     Date of diagnosis: 7/28/23    Prior therapy: Left breast lumpectomy and SLNB  (8/15/23)       New diagnosis.   Discussed diagnosis, prognosis and treatment options at length.     Patient with hormone positive, HER2 negative stage IA breast cancer.   Treated with left breast lumpectomy and SLNB.   Oncotype DX score was low.   Risks of chemotherapy outweighs the small benefit.   Recommend no adjuvant  chemotherapy.   She will need adjuvant RT.   Will have her see rad onc.     Discussed risks versus benefits of AI as well as tamoxifen discussed.   Side effects discussed at length.   Risk of worsening BMD with AI, risk of VTE, endometrial cancer with tamoxifen discussed.   After lengthy discussion patient opted for AI.   New prescription of arimidex sent.   Recommend calcium and vitamin D supplements for bone health.   I will obtain DEXA to evaluate BMD.   Recommend annual mammogram, DEXA scan.     Recommendations:   Discussed diagnosis, prognosis and treatment options at [facility].   No adjuvant chemotherapy due to low oncotype DX score.   Recommend adjuvant radiation.   Recommend DEXA for baseline bone mineral density.   Recommend arimidex. New prescription sent.   Recommend calcium and vitamin D supplements.   CBC, CMP in 3 months.   Annual DEXA, mammogram.               9/2/2023      CC:

## 2023-09-06 ENCOUNTER — TELEPHONE (OUTPATIENT)
Dept: ONCOLOGY | Facility: CLINIC | Age: 58
End: 2023-09-06
Payer: COMMERCIAL

## 2023-09-06 NOTE — TELEPHONE ENCOUNTER
Pt called and states she is having left arm soreness. States it feels like sore muscle. Denies any redness or swelling. Encouraged pt to continue to monitor for redness/swelling. Reach out to surgeons office if redness/swelling does arise. PT verbalizes understanding and denies any further questions.

## 2023-09-08 ENCOUNTER — TELEPHONE (OUTPATIENT)
Dept: ONCOLOGY | Facility: CLINIC | Age: 58
End: 2023-09-08

## 2023-09-08 RX ORDER — TAMOXIFEN CITRATE 20 MG/1
20 TABLET ORAL DAILY
Qty: 90 TABLET | Refills: 0 | Status: SHIPPED | OUTPATIENT
Start: 2023-09-08

## 2023-09-08 NOTE — TELEPHONE ENCOUNTER
Clinical Assessment Needs      Reason for Call: Patient called experiencing real bad muscle aches & pain bottom of feet.  Just found out she has Osteoporosis and is on Arimidex.  Needs to know if she needs to be on something else.  Call came in on voicemail    When did it start: na    Where is it located: na    Characteristics of symptom/severity: severe    Is it constant or intermittent: na    What makes it worse: na    What makes it better: na    What therapies/medications have you tried: Arimidex    When was the patient last seen: 09/01/2023    Pharmacy: [facility]    Call back# [phone]

## 2023-09-08 NOTE — TELEPHONE ENCOUNTER
She DOES NOT have osteoporosis.   She has osteopenia.   I will order tamoxifen if she prefers to switch due to pain

## 2023-09-12 ENCOUNTER — DOCUMENTATION (OUTPATIENT)
Dept: FAMILY MEDICINE CLINIC | Facility: CLINIC | Age: 58
End: 2023-09-12
Payer: COMMERCIAL

## 2023-09-12 DIAGNOSIS — C50.412 MALIGNANT NEOPLASM OF UPPER-OUTER QUADRANT OF LEFT BREAST IN FEMALE, ESTROGEN RECEPTOR POSITIVE: ICD-10-CM

## 2023-09-12 DIAGNOSIS — Z17.0 MALIGNANT NEOPLASM OF UPPER-OUTER QUADRANT OF LEFT BREAST IN FEMALE, ESTROGEN RECEPTOR POSITIVE: ICD-10-CM

## 2023-09-12 RX ORDER — HYDROCODONE BITARTRATE AND ACETAMINOPHEN 5; 325 MG/1; MG/1
1 TABLET ORAL EVERY 6 HOURS PRN
Qty: 28 TABLET | Refills: 0 | Status: SHIPPED | OUTPATIENT
Start: 2023-09-12

## 2023-09-12 NOTE — PROGRESS NOTES
Patient having some generalized pain issues following Breast cancer treatment for which she is still actively being followed.  She is requesting a short-term supply of pain medication.  Sent 1 week supply of Norco 5 mg.  The patient has read and signed the [facility] Controlled Substance Contract.  I will continue to see patient for regular follow up appointments. Patient is well controlled on the medication.  KADY has been reviewed by me and is updated every 3 months. The patient is aware of the potential for addiction and dependence.           This document has been electronically signed by Siobhan Cervantes MD on September 12, 2023 08:00 CDT

## 2023-09-14 ENCOUNTER — HOSPITAL ENCOUNTER (OUTPATIENT)
Dept: RADIATION ONCOLOGY | Facility: HOSPITAL | Age: 58
Discharge: HOME | End: 2023-09-14
Payer: COMMERCIAL

## 2023-09-14 ENCOUNTER — HOSPITAL ENCOUNTER (OUTPATIENT)
Dept: RADIATION ONCOLOGY | Facility: HOSPITAL | Age: 58
Setting detail: RADIATION/ONCOLOGY SERIES
End: 2023-09-14
Payer: COMMERCIAL

## 2023-09-14 DIAGNOSIS — Z17.0 MALIGNANT NEOPLASM OF UPPER-OUTER QUADRANT OF LEFT BREAST IN FEMALE, ESTROGEN RECEPTOR POSITIVE: ICD-10-CM

## 2023-09-14 DIAGNOSIS — C50.412 MALIGNANT NEOPLASM OF UPPER-OUTER QUADRANT OF LEFT BREAST IN FEMALE, ESTROGEN RECEPTOR POSITIVE: ICD-10-CM

## 2023-09-18 PROCEDURE — 77338 DESIGN MLC DEVICE FOR IMRT: CPT | Performed by: STUDENT IN AN ORGANIZED HEALTH CARE EDUCATION/TRAINING PROGRAM

## 2023-09-18 PROCEDURE — 77301 RADIOTHERAPY DOSE PLAN IMRT: CPT | Performed by: STUDENT IN AN ORGANIZED HEALTH CARE EDUCATION/TRAINING PROGRAM

## 2023-09-18 PROCEDURE — 77300 RADIATION THERAPY DOSE PLAN: CPT | Performed by: STUDENT IN AN ORGANIZED HEALTH CARE EDUCATION/TRAINING PROGRAM

## 2023-09-18 RX ORDER — TRAZODONE HYDROCHLORIDE 100 MG/1
100 TABLET ORAL NIGHTLY
Qty: 30 TABLET | Refills: 5 | Status: SHIPPED | OUTPATIENT
Start: 2023-09-18

## 2023-09-18 RX ORDER — BUSPIRONE HYDROCHLORIDE 10 MG/1
10 TABLET ORAL 3 TIMES DAILY PRN
Qty: 90 TABLET | Refills: 3 | Status: SHIPPED | OUTPATIENT
Start: 2023-09-18

## 2023-09-18 RX ORDER — VENLAFAXINE 37.5 MG/1
37.5 TABLET ORAL 2 TIMES DAILY
Qty: 60 TABLET | Refills: 5 | Status: SHIPPED | OUTPATIENT
Start: 2023-09-18

## 2023-09-19 ENCOUNTER — HOSPITAL ENCOUNTER (OUTPATIENT)
Dept: RADIATION ONCOLOGY | Facility: HOSPITAL | Age: 58
Discharge: HOME | End: 2023-09-19
Payer: COMMERCIAL

## 2023-09-19 LAB
RAD ONC ARIA COURSE ID: 1
RAD ONC ARIA COURSE INTENT: NORMAL
RAD ONC ARIA COURSE LAST TREATMENT DATE: NORMAL
RAD ONC ARIA COURSE START DATE: NORMAL
RAD ONC ARIA COURSE TREATMENT ELAPSED DAYS: 0
RAD ONC ARIA FIRST TREATMENT DATE: NORMAL
RAD ONC ARIA PLAN FRACTIONS TREATED TO DATE: 1
RAD ONC ARIA PLAN ID: NORMAL
RAD ONC ARIA PLAN NAME: NORMAL
RAD ONC ARIA PLAN PRESCRIBED DOSE PER FRACTION: 6 GY
RAD ONC ARIA PLAN PRIMARY REFERENCE POINT: NORMAL
RAD ONC ARIA PLAN TOTAL FRACTIONS PRESCRIBED: 5
RAD ONC ARIA PLAN TOTAL PRESCRIBED DOSE: 3000 CGY
RAD ONC ARIA REFERENCE POINT DOSAGE GIVEN TO DATE: 6 GY
RAD ONC ARIA REFERENCE POINT ID: NORMAL
RAD ONC ARIA REFERENCE POINT SESSION DOSAGE GIVEN: 6 GY

## 2023-09-19 PROCEDURE — 77427 RADIATION TX MANAGEMENT X5: CPT | Performed by: STUDENT IN AN ORGANIZED HEALTH CARE EDUCATION/TRAINING PROGRAM

## 2023-09-19 PROCEDURE — 77385: CPT | Performed by: STUDENT IN AN ORGANIZED HEALTH CARE EDUCATION/TRAINING PROGRAM

## 2023-09-21 ENCOUNTER — HOSPITAL ENCOUNTER (OUTPATIENT)
Dept: RADIATION ONCOLOGY | Facility: HOSPITAL | Age: 58
Discharge: HOME | End: 2023-09-21
Payer: COMMERCIAL

## 2023-09-21 LAB
RAD ONC ARIA COURSE ID: 1
RAD ONC ARIA COURSE INTENT: NORMAL
RAD ONC ARIA COURSE LAST TREATMENT DATE: NORMAL
RAD ONC ARIA COURSE START DATE: NORMAL
RAD ONC ARIA COURSE TREATMENT ELAPSED DAYS: 2
RAD ONC ARIA FIRST TREATMENT DATE: NORMAL
RAD ONC ARIA PLAN FRACTIONS TREATED TO DATE: 2
RAD ONC ARIA PLAN ID: NORMAL
RAD ONC ARIA PLAN NAME: NORMAL
RAD ONC ARIA PLAN PRESCRIBED DOSE PER FRACTION: 6 GY
RAD ONC ARIA PLAN PRIMARY REFERENCE POINT: NORMAL
RAD ONC ARIA PLAN TOTAL FRACTIONS PRESCRIBED: 5
RAD ONC ARIA PLAN TOTAL PRESCRIBED DOSE: 3000 CGY
RAD ONC ARIA REFERENCE POINT DOSAGE GIVEN TO DATE: 12 GY
RAD ONC ARIA REFERENCE POINT ID: NORMAL
RAD ONC ARIA REFERENCE POINT SESSION DOSAGE GIVEN: 6 GY

## 2023-09-21 PROCEDURE — 77385: CPT | Performed by: STUDENT IN AN ORGANIZED HEALTH CARE EDUCATION/TRAINING PROGRAM

## 2023-09-22 ENCOUNTER — DOCUMENTATION (OUTPATIENT)
Dept: NUTRITION | Facility: HOSPITAL | Age: 58
End: 2023-09-22
Payer: COMMERCIAL

## 2023-09-22 NOTE — PROGRESS NOTES
Adult Outpatient Nutrition  Assessment    Patient Name:  Anel Mullen  YOB: 1965  MRN: [id number]    Assessment Date:  Entry from 9/21/2023    Comments:  Face-to-face introductions from past phone assessment/education. No questions verbalized. Pt agreed to call on RDN as needed.                    Electronically signed by:  Tia Gerard RD  09/22/23 08:08 CDT

## 2023-09-25 ENCOUNTER — HOSPITAL ENCOUNTER (OUTPATIENT)
Dept: RADIATION ONCOLOGY | Facility: HOSPITAL | Age: 58
Discharge: HOME | End: 2023-09-25
Payer: COMMERCIAL

## 2023-09-25 LAB
RAD ONC ARIA COURSE ID: 1
RAD ONC ARIA COURSE INTENT: NORMAL
RAD ONC ARIA COURSE LAST TREATMENT DATE: NORMAL
RAD ONC ARIA COURSE START DATE: NORMAL
RAD ONC ARIA COURSE TREATMENT ELAPSED DAYS: 6
RAD ONC ARIA FIRST TREATMENT DATE: NORMAL
RAD ONC ARIA PLAN FRACTIONS TREATED TO DATE: 3
RAD ONC ARIA PLAN ID: NORMAL
RAD ONC ARIA PLAN NAME: NORMAL
RAD ONC ARIA PLAN PRESCRIBED DOSE PER FRACTION: 6 GY
RAD ONC ARIA PLAN PRIMARY REFERENCE POINT: NORMAL
RAD ONC ARIA PLAN TOTAL FRACTIONS PRESCRIBED: 5
RAD ONC ARIA PLAN TOTAL PRESCRIBED DOSE: 3000 CGY
RAD ONC ARIA REFERENCE POINT DOSAGE GIVEN TO DATE: 18 GY
RAD ONC ARIA REFERENCE POINT ID: NORMAL
RAD ONC ARIA REFERENCE POINT SESSION DOSAGE GIVEN: 6 GY

## 2023-09-25 PROCEDURE — 77336 RADIATION PHYSICS CONSULT: CPT | Performed by: STUDENT IN AN ORGANIZED HEALTH CARE EDUCATION/TRAINING PROGRAM

## 2023-09-25 PROCEDURE — 77385: CPT | Performed by: STUDENT IN AN ORGANIZED HEALTH CARE EDUCATION/TRAINING PROGRAM

## 2023-09-27 ENCOUNTER — HOSPITAL ENCOUNTER (OUTPATIENT)
Dept: RADIATION ONCOLOGY | Facility: HOSPITAL | Age: 58
Discharge: HOME | End: 2023-09-27

## 2023-09-27 ENCOUNTER — HOSPITAL ENCOUNTER (OUTPATIENT)
Dept: RADIATION ONCOLOGY | Facility: HOSPITAL | Age: 58
Discharge: HOME | End: 2023-09-27
Payer: COMMERCIAL

## 2023-09-27 VITALS
BODY MASS INDEX: 29.2 KG/M2 | WEIGHT: 157.1 LBS | TEMPERATURE: 96.9 F | DIASTOLIC BLOOD PRESSURE: 77 MMHG | SYSTOLIC BLOOD PRESSURE: 136 MMHG | RESPIRATION RATE: 18 BRPM | HEART RATE: 92 BPM | OXYGEN SATURATION: 96 %

## 2023-09-27 DIAGNOSIS — Z17.0 MALIGNANT NEOPLASM OF UPPER-OUTER QUADRANT OF LEFT BREAST IN FEMALE, ESTROGEN RECEPTOR POSITIVE: Primary | ICD-10-CM

## 2023-09-27 DIAGNOSIS — C50.412 MALIGNANT NEOPLASM OF UPPER-OUTER QUADRANT OF LEFT BREAST IN FEMALE, ESTROGEN RECEPTOR POSITIVE: Primary | ICD-10-CM

## 2023-09-27 LAB
RAD ONC ARIA COURSE ID: 1
RAD ONC ARIA COURSE INTENT: NORMAL
RAD ONC ARIA COURSE LAST TREATMENT DATE: NORMAL
RAD ONC ARIA COURSE START DATE: NORMAL
RAD ONC ARIA COURSE TREATMENT ELAPSED DAYS: 8
RAD ONC ARIA FIRST TREATMENT DATE: NORMAL
RAD ONC ARIA PLAN FRACTIONS TREATED TO DATE: 4
RAD ONC ARIA PLAN ID: NORMAL
RAD ONC ARIA PLAN NAME: NORMAL
RAD ONC ARIA PLAN PRESCRIBED DOSE PER FRACTION: 6 GY
RAD ONC ARIA PLAN PRIMARY REFERENCE POINT: NORMAL
RAD ONC ARIA PLAN TOTAL FRACTIONS PRESCRIBED: 5
RAD ONC ARIA PLAN TOTAL PRESCRIBED DOSE: 3000 CGY
RAD ONC ARIA REFERENCE POINT DOSAGE GIVEN TO DATE: 24 GY
RAD ONC ARIA REFERENCE POINT ID: NORMAL
RAD ONC ARIA REFERENCE POINT SESSION DOSAGE GIVEN: 6 GY

## 2023-09-27 PROCEDURE — 77385: CPT | Performed by: STUDENT IN AN ORGANIZED HEALTH CARE EDUCATION/TRAINING PROGRAM

## 2023-09-27 NOTE — PROGRESS NOTES
On Treatment Visit     Patient: Anel Mullen   YOB: 1965   Medical Record Number: [id number]     Date of Visit  September 27, 2023   Primary Diagnosis:  Cancer Staging   Malignant neoplasm of upper-outer quadrant of left breast in female, estrogen receptor positive  Staging form: Breast, AJCC 8th Edition  - Clinical stage from 8/7/2023: Stage IA (cT1b, cN0, cM0, G2, ER+, AK+, HER2-) - Signed by Yasir Raines MD on 8/7/2023  - Pathologic stage from 8/31/2023: Stage IA (pT1b, pN0(sn), cM0, G2, ER+, AK+, HER2-) - Signed by Yasir Raines MD on 8/31/2023      Ms. Mullen was seen today for an on treatment visit. She is receiving radiation therapy to the left breast.     Treatment Site Modality Dose per fraction (cGy) Fractions Total dose (cGy)   Left partial breast IMRT, 6 MV photons 600 4/5 2400/3000     Concurrent therapy: anastrozole    She is doing well today. No significant new breast concerns.    Pain Score    09/27/23 1509   PainSc: 0-No pain     Anel Mullen reports a pain score of 0.  Given her pain assessment as noted, treatment options were discussed and the following options were decided upon as a follow-up plan to address the patient's pain: continuation of current treatment plan for pain.    Vitals:    09/27/23 1509   BP: 136/77   Pulse: 92   Resp: 18   Temp: 96.9 °F (36.1 °C)   SpO2: 96%     Wt Readings from Last 3 Encounters:   09/27/23 71.3 kg (157 lb 1.6 oz)   08/31/23 72.1 kg (159 lb)   08/30/23 71.2 kg (157 lb)     On exam, she is sitting up in no distress, breathing comfortably on room air. Left breast scar without significant changes. Female nurse chaperone was present for the exam.    Plan: I have reviewed treatment setup notes and checked and approved the daily guidance images. I reviewed dose delivery and treatment parameters and deemed them appropriate. Continue radiation as prescribed.    Electronically signed by Yasir Raines MD 09/27/23 15:40 CDT

## 2023-09-27 NOTE — LETTER
September 27, 2023        This statement is provided for Anel SURESHJuli Mullen to give reassurance that the radiation therapy that she is receiving does not pose any health risk to anyone else including children.  If you have any questions, please call [phone] to speak with Dr. Raines or his nurse, Jennifer GONSALEZ.      Thank you for your concern,        Jennifer Mcknight, RN, BSN, OCN/Dr. Yancy Raines  Radiation Oncology

## 2023-09-29 ENCOUNTER — OFFICE VISIT (OUTPATIENT)
Dept: SURGERY | Facility: CLINIC | Age: 58
End: 2023-09-29
Payer: COMMERCIAL

## 2023-09-29 ENCOUNTER — DOCUMENTATION (OUTPATIENT)
Dept: NUTRITION | Facility: HOSPITAL | Age: 58
End: 2023-09-29
Payer: COMMERCIAL

## 2023-09-29 ENCOUNTER — HOSPITAL ENCOUNTER (OUTPATIENT)
Dept: RADIATION ONCOLOGY | Facility: HOSPITAL | Age: 58
Discharge: HOME | End: 2023-09-29
Payer: COMMERCIAL

## 2023-09-29 VITALS
DIASTOLIC BLOOD PRESSURE: 80 MMHG | HEIGHT: 62 IN | HEART RATE: 83 BPM | SYSTOLIC BLOOD PRESSURE: 140 MMHG | BODY MASS INDEX: 29 KG/M2 | OXYGEN SATURATION: 97 % | WEIGHT: 157.6 LBS | TEMPERATURE: 97.6 F

## 2023-09-29 DIAGNOSIS — Z48.817 ENCOUNTER FOR SURGICAL AFTERCARE FOLLOWING SURGERY ON THE SKIN AND SUBCUTANEOUS TISSUE: Primary | ICD-10-CM

## 2023-09-29 LAB
RAD ONC ARIA COURSE ID: 1
RAD ONC ARIA COURSE INTENT: NORMAL
RAD ONC ARIA COURSE LAST TREATMENT DATE: NORMAL
RAD ONC ARIA COURSE START DATE: NORMAL
RAD ONC ARIA COURSE TREATMENT ELAPSED DAYS: 10
RAD ONC ARIA FIRST TREATMENT DATE: NORMAL
RAD ONC ARIA PLAN FRACTIONS TREATED TO DATE: 5
RAD ONC ARIA PLAN ID: NORMAL
RAD ONC ARIA PLAN NAME: NORMAL
RAD ONC ARIA PLAN PRESCRIBED DOSE PER FRACTION: 6 GY
RAD ONC ARIA PLAN PRIMARY REFERENCE POINT: NORMAL
RAD ONC ARIA PLAN TOTAL FRACTIONS PRESCRIBED: 5
RAD ONC ARIA PLAN TOTAL PRESCRIBED DOSE: 3000 CGY
RAD ONC ARIA REFERENCE POINT DOSAGE GIVEN TO DATE: 30 GY
RAD ONC ARIA REFERENCE POINT ID: NORMAL
RAD ONC ARIA REFERENCE POINT SESSION DOSAGE GIVEN: 6 GY

## 2023-09-29 PROCEDURE — 99024 POSTOP FOLLOW-UP VISIT: CPT | Performed by: SURGERY

## 2023-09-29 PROCEDURE — 77385: CPT | Performed by: STUDENT IN AN ORGANIZED HEALTH CARE EDUCATION/TRAINING PROGRAM

## 2023-09-29 NOTE — PROGRESS NOTES
Chief Complaint   Patient presents with    Post-op     8/15/23 left lumpectomy with SN biopsy        58-year-old female approximately 6 weeks status post left lumpectomy and sentinel lymph node biopsy.  She is here to follow-up for the numbness and burning sensation in her left arm.  She states that the burning has gone away, but she does still have some residual numbness on the back of her upper arm.  This is not bothersome.  She has her last radiation treatment today.      Past Medical History:   Diagnosis Date    Abnormal mammogram of left breast     Allergic rhinitis     Allergic rhinitis - Seasonal, particularly tree pollens, etc.       Breast cancer 07/28/2023    Carpal tunnel syndrome     Cystic acne     Cystic acne - Reassurance given; not cancer       Dupuytren's contracture of both hands 09/11/2018    Encounter for screening mammogram for malignant neoplasm of breast     Eustachian tube disorder     GERD (gastroesophageal reflux disease)     Hyperlipidemia     Changed Pravachol to Lipitor, 6/2014    Hypertension     Iron deficiency anemia     Iron deficiency anemia - Discovered summer 2014. On oral iron replacement.       Mild intermittent asthma     Palpitations     Palpitations - Back to baseline. Unremarkable Holter March 2013       Plantar fasciitis     Plantar fasciitis - Left lower extremity. Recurrent. Controlled 5/2/2014       Pruritic rash     Pruritic rash - Apparently related to environmental allergens. Resolved 6/2014. Some recurrence on the right upper extremity, 1/2015       Restless legs syndrome (RLS) 09/11/2018    Seafood allergy 02/11/2019    Sleep apnea     cpap    Tendinitis     Tendinitis AND/OR tenosynovitis of the elbow region - Right          Past Surgical History:   Procedure Laterality Date    BREAST BIOPSY Left 07/28/2023    BREAST LUMPECTOMY WITH SENTINEL NODE BIOPSY Left 8/15/2023    Procedure: BREAST LUMPECTOMY WITH SENTINEL NODE BIOPSY           (INJECTION ON  SDS @07:00  A.M.)   (NEEDLE LOCAL @08:00 [facility] );  Surgeon: Victoriano Sauer MD;  Location: [facility];  Service: General;  Laterality: Left;    CARPAL TUNNEL RELEASE Right 02/17/2017    Dr. Malia [place], [place]    CARPAL TUNNEL RELEASE Left     LACERATION REPAIR Right     index  finger    TONSILLECTOMY      ULNAR TUNNEL RELEASE Right     WISDOM TOOTH EXTRACTION      4         Current Outpatient Medications:     albuterol (PROVENTIL) (2.5 MG/3ML) 0.083% nebulizer solution, Take 2.5 mg by nebulization Every 4 (Four) Hours As Needed., Disp: , Rfl:     albuterol sulfate  (90 Base) MCG/ACT inhaler, Inhale 2 puffs Every 6 (Six) Hours As Needed for Wheezing., Disp: 8.5 g, Rfl: 5    ALPRAZolam (XANAX) 1 MG tablet, Take 1 tablet by mouth 2 (Two) Times a Day As Needed for Anxiety., Disp: 60 tablet, Rfl: 0    amLODIPine (NORVASC) 5 MG tablet, Take 1 tablet by mouth Daily., Disp: 90 tablet, Rfl: 3    atorvastatin (LIPITOR) 40 MG tablet, Take 1 tablet by mouth Daily. (Patient taking differently: Take 1 tablet by mouth Every Night.), Disp: 90 tablet, Rfl: 3    Biotin 5000 MCG capsule, Take 1 capsule by mouth Daily., Disp: , Rfl:     Budeson-Glycopyrrol-Formoterol (BREZTRI) 160-9-4.8 MCG/ACT aerosol inhaler, Inhale 2 puffs 2 (Two) Times a Day As Needed., Disp: , Rfl:     busPIRone (BUSPAR) 10 MG tablet, Take 1 tablet by mouth 3 (Three) Times a Day As Needed., Disp: 90 tablet, Rfl: 3    Calcium Citrate-Vitamin D3 (CITRACAL) 315-6.25 MG-MCG tablet tablet, Take 2 tablets by mouth Daily., Disp: , Rfl:     cetirizine (zyrTEC) 10 MG tablet, Take 1 tablet by mouth Every Night., Disp: 90 tablet, Rfl: 3    diclofenac (VOLTAREN) 50 MG EC tablet, Take 1 tablet by mouth 2 (Two) Times a Day As Needed for arthritis pain. **Take with food**, Disp: 60 tablet, Rfl: 5    ferrous gluconate 324 (37.5 Fe) MG tablet tablet, Take 1 tablet by mouth Daily With Breakfast., Disp: , Rfl:     fluticasone (FLONASE) 50 MCG/ACT nasal spray, 2 sprays into the  nostril(s) as directed by provider Daily., Disp: 1 bottle, Rfl: 5    magnesium oxide (MAG-OX) 400 MG tablet, Take 1 tablet by mouth Daily. With zinc, Disp: , Rfl:     naloxone (NARCAN) 4 MG/0.1ML nasal spray, Call 911-Don't prime-Spray in 1 nostril for overdose. Repeat in 2-3 minutes in other nostril if no or minimal breathing/responsiveness., Disp: 2 each, Rfl: 0    Nebulizer misc, , Disp: , Rfl:     ondansetron ODT (ZOFRAN ODT) 4 MG disintegrating tablet, Take 1 tablet by mouth Every 8 (Eight) Hours As Needed for Nausea or Vomiting., Disp: 21 tablet, Rfl: 0    pantoprazole (PROTONIX) 40 MG EC tablet, Take 1 tablet by mouth Daily., Disp: 90 tablet, Rfl: 3    rOPINIRole (REQUIP) 1 MG tablet, Take 1 tablet by mouth 3 (Three) Times a Day. Take third dose 1 hour before bedtime., Disp: 270 tablet, Rfl: 3    traZODone (DESYREL) 100 MG tablet, Take 1 tablet by mouth Every Night., Disp: 30 tablet, Rfl: 5    triamcinolone (KENALOG) 0.1 % cream, Apply  topically 3 (Three) Times a Day As Needed for Rash., Disp: 75 g, Rfl: 5    venlafaxine (EFFEXOR) 37.5 MG tablet, Take 1 tablet by mouth 2 (Two) Times a Day., Disp: 60 tablet, Rfl: 5    vitamin D (ERGOCALCIFEROL) 1.25 MG (86093 UT) capsule capsule, Take 1 capsule by mouth 1 (One) Time Per Week. (Patient taking differently: Take 1 capsule by mouth 1 (One) Time Per Week. TAKES EVERY FRIDAY.), Disp: 15 capsule, Rfl: 3    tamoxifen (NOLVADEX) 20 MG chemo tablet, Take 1 tablet by mouth Daily. (Patient not taking: Reported on 9/29/2023), Disp: 90 tablet, Rfl: 0  No current facility-administered medications for this visit.    Facility-Administered Medications Ordered in Other Visits:     lidocaine 1% - EPINEPHrine 1:[id number] (XYLOCAINE W/EPI) 1 %-1:378606 injection 2 mL, 2 mL, Infiltration, Once, Anirudh Thornton MD    Allergies   Allergen Reactions    Codeine Nausea And Vomiting     Abdominal pain    Bactrim [Sulfamethoxazole-Trimethoprim] Irritability    Shellfish-Derived Products  Swelling     Gums swelling, abdominal pain, itching if touches skin    Sulfa Antibiotics Other (See Comments)     shaking    Meloxicam Rash       Immunization History   Administered Date(s) Administered    COVID-19 (COLLEEN) 01/31/2022    Hepatitis B Adult/Adolescent IM 08/25/2014, 09/25/2014    MMR 08/25/2014    Td (TDVAX) 03/23/1998    Tetanus 01/01/1998        Family History   Problem Relation Age of Onset    Hypertension Mother     Diabetes Mother     Hyperlipidemia Mother     Osteoporosis Mother     Hypertension Father     Diabetes Father     Hyperlipidemia Father     Pancreatitis Father     Osteoarthritis Sister     Hyperlipidemia Sister     No Known Problems Son     Colon cancer Maternal Grandmother     Diabetes Maternal Grandfather     Heart disease Paternal Grandmother     No Known Problems Paternal Grandfather        Social History     Socioeconomic History    Marital status:      Spouse name: Anderson    Number of children: 3    Highest education level: Some college, no degree   Tobacco Use    Smoking status: Never     Passive exposure: Past    Smokeless tobacco: Never    Tobacco comments:     Passive as a child for 18 years   Vaping Use    Vaping Use: Never used   Substance and Sexual Activity    Alcohol use: Yes     Comment: occasionally    Drug use: Never    Sexual activity: Defer     Partners: Male     Birth control/protection: Post-menopausal     Comment: menopause @ age 44           Vitals:    09/29/23 1020   BP: 140/80   Pulse: 83   Temp: 97.6 °F (36.4 °C)   SpO2: 97%       Physical Exam  Constitutional:       Appearance: Normal appearance.   HENT:      Head: Normocephalic and atraumatic.   Cardiovascular:      Rate and Rhythm: Normal rate and regular rhythm.   Pulmonary:      Effort: Pulmonary effort is normal. No respiratory distress.   Skin:     Comments: Well-healing left axillary incision   Neurological:      General: No focal deficit present.      Mental Status: She is alert and oriented  to person, place, and time.   Psychiatric:         Mood and Affect: Mood normal.         Behavior: Behavior normal.         ASSESSMENT    Diagnoses and all orders for this visit:    1. Encounter for surgical aftercare following surgery on the skin and subcutaneous tissue (Primary)        PLAN    1.  Patient recovering well from her left sentinel lymph node biopsy and lumpectomy.  She does still have some residual numbness of the skin on her left arm.  This is not bothersome.  She can follow-up with me as needed.              This document has been electronically signed by Victoriano Sauer MD on September 29, 2023 10:31 CDT

## 2023-09-29 NOTE — PROGRESS NOTES
Adult Outpatient Nutrition  Assessment    Patient Name:  Aenl Mullen  YOB: 1965  MRN: [id number]    Assessment Date:  9/29/2023    Comments: Follow-up to check nutrition. Wt 157 lb (stable). Receiving rad due to breast cancer. No nutrition related problems verbalized. Skin without irritation/breakdown. Reinforced importance of nutrition.                    Electronically signed by:  Tia Gerard RD  09/29/23 14:10 CDT

## (undated) DEVICE — STERILE POLYISOPRENE POWDER-FREE SURGICAL GLOVES WITH EMOLLIENT COATING: Brand: PROTEXIS

## (undated) DEVICE — ADHS SKIN PREMIERPRO EXOFIN TOPICAL HI/VISC .5ML

## (undated) DEVICE — SUT VIC 2/0 TIES 18IN J111T

## (undated) DEVICE — PK MAJ PROC LF 60

## (undated) DEVICE — GAUZE,SPONGE,4"X4",16PLY,XRAY,STRL,LF: Brand: MEDLINE

## (undated) DEVICE — APPL CHLORAPREP HI/LITE 26ML ORNG

## (undated) DEVICE — CVR PROB W/ULTRASND GEL 2 ELAS BND 6X24IN STRL

## (undated) DEVICE — TOWEL,OR,DSP,ST,BLUE,DLX,4/PK,20PK/CS: Brand: MEDLINE

## (undated) DEVICE — NEEDLE,HYPODERM,SAFETY, 22GX1.5: Brand: MEDLINE

## (undated) DEVICE — SUT MONOCRYL 4/0 PS2 27IN Y426H ETY426H

## (undated) DEVICE — GLV SURG SENSICARE PI ORTHO SZ6.5 LF STRL

## (undated) DEVICE — SOL IRR NACL 0.9PCT BT 1000ML

## (undated) DEVICE — SUT SILK 2/0 FS BLK 18IN 685G

## (undated) DEVICE — GLV SURG SENSICARE PI PF LF 7 GRN STRL

## (undated) DEVICE — PENCL ES MEGADINE EZ/CLEAN BUTN W/HOLSTR 10FT

## (undated) DEVICE — SUT VICRYL 3-0 SH-1 PO 18IN J772D

## (undated) DEVICE — ELECTRD BLD EZ CLN MOD XLNG 2.75IN

## (undated) DEVICE — DEV SPECI TRANSPEC W/PERF PLT

## (undated) DEVICE — CVR PROB ULTRASND CIVFLEX GEN/PURP FLT/FOLD 7X24IN STRL LF

## (undated) DEVICE — STERILE POLYISOPRENE POWDER-FREE SURGICAL GLOVES: Brand: PROTEXIS

## (undated) DEVICE — PATIENT RETURN ELECTRODE, SINGLE-USE, CONTACT QUALITY MONITORING, ADULT, WITH 9FT CORD, FOR PATIENTS WEIGING OVER 33LBS. (15KG): Brand: MEGADYNE

## (undated) DEVICE — GLV SURG SENSICARE PI ORTHO PF SZ7 LF STRL

## (undated) DEVICE — DRSNG TELFA PAD NONADH STR 1S 3X8IN

## (undated) DEVICE — CONTAINER,SPECIMEN,OR STERILE,4OZ: Brand: MEDLINE

## (undated) DEVICE — SYR LL TP 10ML STRL